# Patient Record
Sex: FEMALE | HISPANIC OR LATINO | Employment: UNEMPLOYED | URBAN - METROPOLITAN AREA
[De-identification: names, ages, dates, MRNs, and addresses within clinical notes are randomized per-mention and may not be internally consistent; named-entity substitution may affect disease eponyms.]

---

## 2021-06-07 ENCOUNTER — APPOINTMENT (OUTPATIENT)
Dept: GENERAL RADIOLOGY | Facility: CLINIC | Age: 26
End: 2021-06-07
Attending: FAMILY MEDICINE
Payer: COMMERCIAL

## 2021-06-07 ENCOUNTER — HOSPITAL ENCOUNTER (EMERGENCY)
Facility: CLINIC | Age: 26
Discharge: HOME | End: 2021-06-07
Attending: FAMILY MEDICINE | Admitting: FAMILY MEDICINE
Payer: COMMERCIAL

## 2021-06-07 VITALS
RESPIRATION RATE: 18 BRPM | DIASTOLIC BLOOD PRESSURE: 60 MMHG | OXYGEN SATURATION: 98 % | HEART RATE: 80 BPM | SYSTOLIC BLOOD PRESSURE: 112 MMHG | TEMPERATURE: 98.6 F

## 2021-06-07 DIAGNOSIS — E86.0 DEHYDRATION: ICD-10-CM

## 2021-06-07 DIAGNOSIS — B34.9 VIRAL SYNDROME: ICD-10-CM

## 2021-06-07 DIAGNOSIS — G44.219 EPISODIC TENSION-TYPE HEADACHE, NOT INTRACTABLE: ICD-10-CM

## 2021-06-07 LAB
ALBUMIN SERPL-MCNC: 3.1 G/DL (ref 3.4–5)
ALBUMIN UR-MCNC: 30 MG/DL
ALP SERPL-CCNC: 91 U/L (ref 40–150)
ALT SERPL W P-5'-P-CCNC: 63 U/L (ref 0–50)
ANION GAP SERPL CALCULATED.3IONS-SCNC: 4 MMOL/L (ref 3–14)
APPEARANCE UR: ABNORMAL
AST SERPL W P-5'-P-CCNC: 45 U/L (ref 0–45)
BACTERIA #/AREA URNS HPF: ABNORMAL /HPF
BASOPHILS # BLD AUTO: 0 10E9/L (ref 0–0.2)
BASOPHILS NFR BLD AUTO: 0.2 %
BILIRUB SERPL-MCNC: 0.7 MG/DL (ref 0.2–1.3)
BILIRUB UR QL STRIP: NEGATIVE
BUN SERPL-MCNC: 8 MG/DL (ref 7–30)
CALCIUM SERPL-MCNC: 8.5 MG/DL (ref 8.5–10.1)
CHLORIDE SERPL-SCNC: 105 MMOL/L (ref 94–109)
CO2 SERPL-SCNC: 28 MMOL/L (ref 20–32)
COLOR UR AUTO: YELLOW
CREAT SERPL-MCNC: 0.81 MG/DL (ref 0.52–1.04)
DIFFERENTIAL METHOD BLD: ABNORMAL
EOSINOPHIL # BLD AUTO: 0.1 10E9/L (ref 0–0.7)
EOSINOPHIL NFR BLD AUTO: 1.2 %
ERYTHROCYTE [DISTWIDTH] IN BLOOD BY AUTOMATED COUNT: 14.4 % (ref 10–15)
GFR SERPL CREATININE-BSD FRML MDRD: >90 ML/MIN/{1.73_M2}
GLUCOSE SERPL-MCNC: 88 MG/DL (ref 70–99)
GLUCOSE UR STRIP-MCNC: NEGATIVE MG/DL
HCG UR QL: NEGATIVE
HCT VFR BLD AUTO: 39.5 % (ref 35–47)
HGB BLD-MCNC: 12.1 G/DL (ref 11.7–15.7)
HGB UR QL STRIP: ABNORMAL
IMM GRANULOCYTES # BLD: 0.1 10E9/L (ref 0–0.4)
IMM GRANULOCYTES NFR BLD: 0.5 %
KETONES UR STRIP-MCNC: NEGATIVE MG/DL
LABORATORY COMMENT REPORT: NORMAL
LACTATE BLD-SCNC: 1.2 MMOL/L (ref 0.7–2)
LEUKOCYTE ESTERASE UR QL STRIP: NEGATIVE
LYMPHOCYTES # BLD AUTO: 1.6 10E9/L (ref 0.8–5.3)
LYMPHOCYTES NFR BLD AUTO: 17.1 %
MCH RBC QN AUTO: 28 PG (ref 26.5–33)
MCHC RBC AUTO-ENTMCNC: 30.6 G/DL (ref 31.5–36.5)
MCV RBC AUTO: 91 FL (ref 78–100)
MONOCYTES # BLD AUTO: 0.4 10E9/L (ref 0–1.3)
MONOCYTES NFR BLD AUTO: 4.6 %
MUCOUS THREADS #/AREA URNS LPF: PRESENT /LPF
NEUTROPHILS # BLD AUTO: 7 10E9/L (ref 1.6–8.3)
NEUTROPHILS NFR BLD AUTO: 76.4 %
NITRATE UR QL: NEGATIVE
NRBC # BLD AUTO: 0 10*3/UL
NRBC BLD AUTO-RTO: 0 /100
PH UR STRIP: 5.5 PH (ref 5–7)
PLATELET # BLD AUTO: 413 10E9/L (ref 150–450)
POTASSIUM SERPL-SCNC: 3.8 MMOL/L (ref 3.4–5.3)
PROT SERPL-MCNC: 7.4 G/DL (ref 6.8–8.8)
RBC # BLD AUTO: 4.32 10E12/L (ref 3.8–5.2)
RBC #/AREA URNS AUTO: 3 /HPF (ref 0–2)
SARS-COV-2 RNA RESP QL NAA+PROBE: NEGATIVE
SODIUM SERPL-SCNC: 137 MMOL/L (ref 133–144)
SOURCE: ABNORMAL
SP GR UR STRIP: 1.03 (ref 1–1.03)
SPECIMEN SOURCE: NORMAL
SQUAMOUS #/AREA URNS AUTO: 7 /HPF (ref 0–1)
UROBILINOGEN UR STRIP-MCNC: 2 MG/DL (ref 0–2)
WBC # BLD AUTO: 9.2 10E9/L (ref 4–11)
WBC #/AREA URNS AUTO: 3 /HPF (ref 0–5)

## 2021-06-07 PROCEDURE — 87088 URINE BACTERIA CULTURE: CPT | Performed by: FAMILY MEDICINE

## 2021-06-07 PROCEDURE — C9803 HOPD COVID-19 SPEC COLLECT: HCPCS | Performed by: FAMILY MEDICINE

## 2021-06-07 PROCEDURE — 83605 ASSAY OF LACTIC ACID: CPT | Performed by: STUDENT IN AN ORGANIZED HEALTH CARE EDUCATION/TRAINING PROGRAM

## 2021-06-07 PROCEDURE — 96361 HYDRATE IV INFUSION ADD-ON: CPT | Performed by: FAMILY MEDICINE

## 2021-06-07 PROCEDURE — 250N000011 HC RX IP 250 OP 636: Performed by: STUDENT IN AN ORGANIZED HEALTH CARE EDUCATION/TRAINING PROGRAM

## 2021-06-07 PROCEDURE — 87086 URINE CULTURE/COLONY COUNT: CPT | Performed by: FAMILY MEDICINE

## 2021-06-07 PROCEDURE — 71046 X-RAY EXAM CHEST 2 VIEWS: CPT

## 2021-06-07 PROCEDURE — 99284 EMERGENCY DEPT VISIT MOD MDM: CPT | Mod: GC | Performed by: FAMILY MEDICINE

## 2021-06-07 PROCEDURE — 80053 COMPREHEN METABOLIC PANEL: CPT | Performed by: STUDENT IN AN ORGANIZED HEALTH CARE EDUCATION/TRAINING PROGRAM

## 2021-06-07 PROCEDURE — 81001 URINALYSIS AUTO W/SCOPE: CPT | Performed by: FAMILY MEDICINE

## 2021-06-07 PROCEDURE — 96375 TX/PRO/DX INJ NEW DRUG ADDON: CPT | Performed by: FAMILY MEDICINE

## 2021-06-07 PROCEDURE — 96374 THER/PROPH/DIAG INJ IV PUSH: CPT | Performed by: FAMILY MEDICINE

## 2021-06-07 PROCEDURE — 250N000013 HC RX MED GY IP 250 OP 250 PS 637: Performed by: STUDENT IN AN ORGANIZED HEALTH CARE EDUCATION/TRAINING PROGRAM

## 2021-06-07 PROCEDURE — 258N000003 HC RX IP 258 OP 636: Performed by: STUDENT IN AN ORGANIZED HEALTH CARE EDUCATION/TRAINING PROGRAM

## 2021-06-07 PROCEDURE — 87635 SARS-COV-2 COVID-19 AMP PRB: CPT | Performed by: FAMILY MEDICINE

## 2021-06-07 PROCEDURE — 99284 EMERGENCY DEPT VISIT MOD MDM: CPT | Mod: 25 | Performed by: FAMILY MEDICINE

## 2021-06-07 PROCEDURE — 81025 URINE PREGNANCY TEST: CPT | Performed by: FAMILY MEDICINE

## 2021-06-07 PROCEDURE — 85025 COMPLETE CBC W/AUTO DIFF WBC: CPT | Performed by: STUDENT IN AN ORGANIZED HEALTH CARE EDUCATION/TRAINING PROGRAM

## 2021-06-07 RX ORDER — KETOROLAC TROMETHAMINE 30 MG/ML
30 INJECTION, SOLUTION INTRAMUSCULAR; INTRAVENOUS ONCE
Status: COMPLETED | OUTPATIENT
Start: 2021-06-07 | End: 2021-06-07

## 2021-06-07 RX ORDER — ACETAMINOPHEN 325 MG/1
975 TABLET ORAL ONCE
Status: COMPLETED | OUTPATIENT
Start: 2021-06-07 | End: 2021-06-07

## 2021-06-07 RX ORDER — ONDANSETRON 2 MG/ML
4 INJECTION INTRAMUSCULAR; INTRAVENOUS EVERY 30 MIN PRN
Status: DISCONTINUED | OUTPATIENT
Start: 2021-06-07 | End: 2021-06-07 | Stop reason: HOSPADM

## 2021-06-07 RX ORDER — SODIUM CHLORIDE 9 MG/ML
INJECTION, SOLUTION INTRAVENOUS CONTINUOUS
Status: DISCONTINUED | OUTPATIENT
Start: 2021-06-07 | End: 2021-06-07 | Stop reason: HOSPADM

## 2021-06-07 RX ADMIN — ACETAMINOPHEN 975 MG: 325 TABLET, FILM COATED ORAL at 15:43

## 2021-06-07 RX ADMIN — SODIUM CHLORIDE 1000 ML: 9 INJECTION, SOLUTION INTRAVENOUS at 15:44

## 2021-06-07 RX ADMIN — SODIUM CHLORIDE: 9 INJECTION, SOLUTION INTRAVENOUS at 17:13

## 2021-06-07 RX ADMIN — ONDANSETRON 4 MG: 2 INJECTION INTRAMUSCULAR; INTRAVENOUS at 15:43

## 2021-06-07 RX ADMIN — KETOROLAC TROMETHAMINE 30 MG: 30 INJECTION, SOLUTION INTRAMUSCULAR; INTRAVENOUS at 15:44

## 2021-06-07 ASSESSMENT — ENCOUNTER SYMPTOMS: HEADACHES: 1

## 2021-06-07 NOTE — ED PROVIDER NOTES
ED Provider Note  [facility]      History     Chief Complaint   Patient presents with     Headache       Headache    Minda Silverman is a 25 year old female with no chronic health history who presents with 2-day history of vomitus with new onset headache this morning.    History afternoon patient started feeling nauseous and began vomiting.  Unable to eat and barely drink without vomiting.  Currently feels nauseous with urge to vomit though has no contents in stomach.  Yesterday her only symptoms were the nausea and vomit, but this morning she woke up with a severe headache which she describes as one of the worst of her life.  Since morning she is also had periods of feeling extremely cold and sweaty.  Her headache is sensitive to light and she is also had blurring of vision when the headache is at its most severe.  She has not had a headache like this before, describes it as a persistent pulsing ache.  She has full range of motion of her neck and no nuchal rigidity or pain with neck extension or flexion.    Patient has had no recent chest pain, difficulty breathing, diarrhea or constipation.  Has not been able to eat and can barely tolerate liquids due to the nausea.    [place]  She and her family had a picnic that included turkey sandwiches and swimming.  Patient did not get in the water and just watched family swim, no chance she swallowed lake water.  Family also ate the same turkey and no one else in her household is currently sick.  She lives with her brother, sister-in-law, her daughter, and her nieces and nephews. No one in the household has had any recent illnesses.  No recent sick contacts.  Patient has not vaccinated for COVID.    Patient has no chronic illnesses and does not take any daily medications.    Past Medical History  Past Medical History:   Diagnosis Date     NO ACTIVE PROBLEMS      No past surgical history on  file.  No current outpatient medications on file.    No Known Allergies  Family History  No family history on file.  Social History   Social History     Tobacco Use     Smoking status: Current Every Day Smoker     Years: 0.20   Substance Use Topics     Alcohol use: No     Drug use: Not on file      Past medical history, past surgical history, medications, allergies, family history, and social history were reviewed with the patient. No additional pertinent items.       Review of Systems   Neurological: Positive for headaches.     A complete review of systems was performed with pertinent positives and negatives noted in the HPI, and all other systems negative.    Physical Exam   BP: (!) 146/91  Pulse: 92  Temp: 101.6  F (38.7  C)  Resp: 20  SpO2: 98 %     Physical Exam  Constitutional:       Appearance: She is obese.   HENT:      Head: Normocephalic and atraumatic.   Neck:      Musculoskeletal: Normal range of motion. No neck rigidity or muscular tenderness.   Cardiovascular:      Rate and Rhythm: Normal rate and regular rhythm.      Pulses: Normal pulses.      Heart sounds: Normal heart sounds.   Pulmonary:      Effort: Pulmonary effort is normal.      Breath sounds: Normal breath sounds.   Abdominal:      General: Bowel sounds are normal.      Tenderness: There is no abdominal tenderness.   Lymphadenopathy:      Cervical: No cervical adenopathy.   Neurological:      General: No focal deficit present.      Mental Status: She is alert and oriented to person, place, and time.       ED Course      Procedures           Results for orders placed or performed during the hospital encounter of 06/07/21   CBC with platelets differential     Status: Abnormal   Result Value Ref Range    WBC 9.2 4.0 - 11.0 10e9/L    RBC Count 4.32 3.8 - 5.2 10e12/L    Hemoglobin 12.1 11.7 - 15.7 g/dL    Hematocrit 39.5 35.0 - 47.0 %    MCV 91 78 - 100 fl    MCH 28.0 26.5 - 33.0 pg    MCHC 30.6 (L) 31.5 - 36.5 g/dL    RDW 14.4 10.0 - 15.0 %     Platelet Count 413 150 - 450 10e9/L    Diff Method Automated Method     % Neutrophils 76.4 %    % Lymphocytes 17.1 %    % Monocytes 4.6 %    % Eosinophils 1.2 %    % Basophils 0.2 %    % Immature Granulocytes 0.5 %    Nucleated RBCs 0 0 /100    Absolute Neutrophil 7.0 1.6 - 8.3 10e9/L    Absolute Lymphocytes 1.6 0.8 - 5.3 10e9/L    Absolute Monocytes 0.4 0.0 - 1.3 10e9/L    Absolute Eosinophils 0.1 0.0 - 0.7 10e9/L    Absolute Basophils 0.0 0.0 - 0.2 10e9/L    Abs Immature Granulocytes 0.1 0 - 0.4 10e9/L    Absolute Nucleated RBC 0.0    Comprehensive metabolic panel     Status: Abnormal   Result Value Ref Range    Sodium 137 133 - 144 mmol/L    Potassium 3.8 3.4 - 5.3 mmol/L    Chloride 105 94 - 109 mmol/L    Carbon Dioxide 28 20 - 32 mmol/L    Anion Gap 4 3 - 14 mmol/L    Glucose 88 70 - 99 mg/dL    Urea Nitrogen 8 7 - 30 mg/dL    Creatinine 0.81 0.52 - 1.04 mg/dL    GFR Estimate >90 >60 mL/min/[1.73_m2]    GFR Estimate If Black >90 >60 mL/min/[1.73_m2]    Calcium 8.5 8.5 - 10.1 mg/dL    Bilirubin Total 0.7 0.2 - 1.3 mg/dL    Albumin 3.1 (L) 3.4 - 5.0 g/dL    Protein Total 7.4 6.8 - 8.8 g/dL    Alkaline Phosphatase 91 40 - 150 U/L    ALT 63 (H) 0 - 50 U/L    AST 45 0 - 45 U/L   Lactic acid whole blood     Status: None   Result Value Ref Range    Lactic Acid 1.2 0.7 - 2.0 mmol/L   UA with Microscopic     Status: Abnormal   Result Value Ref Range    Color Urine Yellow     Appearance Urine Slightly Cloudy     Glucose Urine Negative NEG^Negative mg/dL    Bilirubin Urine Negative NEG^Negative    Ketones Urine Negative NEG^Negative mg/dL    Specific Gravity Urine 1.035 1.003 - 1.035    Blood Urine Moderate (A) NEG^Negative    pH Urine 5.5 5.0 - 7.0 pH    Protein Albumin Urine 30 (A) NEG^Negative mg/dL    Urobilinogen mg/dL 2.0 0.0 - 2.0 mg/dL    Nitrite Urine Negative NEG^Negative    Leukocyte Esterase Urine Negative NEG^Negative    Source Midstream Urine     WBC Urine 3 0 - 5 /HPF    RBC Urine 3 (H) 0 - 2 /HPF     Bacteria Urine None (A) NEG^Negative /HPF    Squamous Epithelial /HPF Urine 7 (H) 0 - 1 /HPF    Mucous Urine Present (A) NEG^Negative /LPF   HCG qualitative urine     Status: None   Result Value Ref Range    HCG Qual Urine Negative NEG^Negative     Medications   0.9% sodium chloride BOLUS (0 mLs Intravenous Stopped 6/7/21 1636)     Followed by   sodium chloride 0.9% infusion (has no administration in time range)   ondansetron (ZOFRAN) injection 4 mg (4 mg Intravenous Given 6/7/21 1543)   ketorolac (TORADOL) injection 30 mg (30 mg Intravenous Given 6/7/21 1544)   acetaminophen (TYLENOL) tablet 975 mg (975 mg Oral Given 6/7/21 1543)        Assessments & Plan (with Medical Decision Making)   25-year-old with 2-day history of nausea and vomit with new onset headache.  Patient's illness likely viral in etiology and exam not consistent with meningitis (i.e. no nuchal rigidity).  Headache this morning likely the result of electrolyte depletion from poor p.o. intake and excessive vomiting.  We will focus on pain control and fluid repletion.    -CBC, CMP, lactate  -1 L fluid bolus  -Zofran as needed  -Acetaminophen p.o.  -Toradol injection    I have reviewed the nursing notes. I have reviewed the findings, diagnosis, plan and need for follow up with the patient.    Patient with likely headache dehydration viral syndrome at this time symptoms have resolved and improved significantly with IV fluids Tylenol and Zofran patient has likely viral syndrome at time of dictation Covid test is pending as is urinalysis otherwise essentially negative work-up thus far.  Once again repeat examination did not reveal any nuchal rigidity or meningeal signs.  Patient will remain here in the emergency room until Covid test and urinalysis results are returned.    Final diagnoses:   Viral syndrome   Episodic tension-type headache, not intractable   Dehydration     This patient has been discussed with and seen by my attending who agrees with my  assessment and plan.     Tasneem Eason MD  PGY1 Psychiatry Resident    --  Sam Trimble  [facility] ED EMERGENCY DEPARTMENT  6/7/2021     Sam Trimble MD  06/07/21 [id number]

## 2021-06-07 NOTE — ED TRIAGE NOTES
Pt states no hx of HA (denies neck pain but has a temp) and placed on COVID precaution pending provider final decision.

## 2021-06-07 NOTE — ED NOTES
Emergency Department Patient Sign-out       Brief HPI:  This is a 25 year old female signed out to me by Dr. Trimble.  See initial ED Provider note for details of the presentation.      Awaiting Covid test result at time of signout.            Exam:   Patient Vitals for the past 24 hrs:   BP Temp Temp src Pulse Resp SpO2   06/07/21 1807 109/59 -- -- 72 -- 99 %   06/07/21 1619 106/57 98.6  F (37  C) Oral 75 18 100 %   06/07/21 1434 (!) 146/91 101.6  F (38.7  C) Oral 92 20 98 %           ED RESULTS:   Results for orders placed or performed during the hospital encounter of 06/07/21 (from the past 24 hour(s))   CBC with platelets differential     Status: Abnormal    Collection Time: 06/07/21  3:44 PM   Result Value Ref Range    WBC 9.2 4.0 - 11.0 10e9/L    RBC Count 4.32 3.8 - 5.2 10e12/L    Hemoglobin 12.1 11.7 - 15.7 g/dL    Hematocrit 39.5 35.0 - 47.0 %    MCV 91 78 - 100 fl    MCH 28.0 26.5 - 33.0 pg    MCHC 30.6 (L) 31.5 - 36.5 g/dL    RDW 14.4 10.0 - 15.0 %    Platelet Count 413 150 - 450 10e9/L    Diff Method Automated Method     % Neutrophils 76.4 %    % Lymphocytes 17.1 %    % Monocytes 4.6 %    % Eosinophils 1.2 %    % Basophils 0.2 %    % Immature Granulocytes 0.5 %    Nucleated RBCs 0 0 /100    Absolute Neutrophil 7.0 1.6 - 8.3 10e9/L    Absolute Lymphocytes 1.6 0.8 - 5.3 10e9/L    Absolute Monocytes 0.4 0.0 - 1.3 10e9/L    Absolute Eosinophils 0.1 0.0 - 0.7 10e9/L    Absolute Basophils 0.0 0.0 - 0.2 10e9/L    Abs Immature Granulocytes 0.1 0 - 0.4 10e9/L    Absolute Nucleated RBC 0.0    Comprehensive metabolic panel     Status: Abnormal    Collection Time: 06/07/21  3:44 PM   Result Value Ref Range    Sodium 137 133 - 144 mmol/L    Potassium 3.8 3.4 - 5.3 mmol/L    Chloride 105 94 - 109 mmol/L    Carbon Dioxide 28 20 - 32 mmol/L    Anion Gap 4 3 - 14 mmol/L    Glucose 88 70 - 99 mg/dL    Urea Nitrogen 8 7 - 30 mg/dL    Creatinine 0.81 0.52 - 1.04 mg/dL    GFR Estimate >90 >60 mL/min/[1.73_m2]     GFR Estimate If Black >90 >60 mL/min/[1.73_m2]    Calcium 8.5 8.5 - 10.1 mg/dL    Bilirubin Total 0.7 0.2 - 1.3 mg/dL    Albumin 3.1 (L) 3.4 - 5.0 g/dL    Protein Total 7.4 6.8 - 8.8 g/dL    Alkaline Phosphatase 91 40 - 150 U/L    ALT 63 (H) 0 - 50 U/L    AST 45 0 - 45 U/L   Lactic acid whole blood     Status: None    Collection Time: 06/07/21  3:44 PM   Result Value Ref Range    Lactic Acid 1.2 0.7 - 2.0 mmol/L   UA with Microscopic     Status: Abnormal    Collection Time: 06/07/21  4:19 PM   Result Value Ref Range    Color Urine Yellow     Appearance Urine Slightly Cloudy     Glucose Urine Negative NEG^Negative mg/dL    Bilirubin Urine Negative NEG^Negative    Ketones Urine Negative NEG^Negative mg/dL    Specific Gravity Urine 1.035 1.003 - 1.035    Blood Urine Moderate (A) NEG^Negative    pH Urine 5.5 5.0 - 7.0 pH    Protein Albumin Urine 30 (A) NEG^Negative mg/dL    Urobilinogen mg/dL 2.0 0.0 - 2.0 mg/dL    Nitrite Urine Negative NEG^Negative    Leukocyte Esterase Urine Negative NEG^Negative    Source Midstream Urine     WBC Urine 3 0 - 5 /HPF    RBC Urine 3 (H) 0 - 2 /HPF    Bacteria Urine None (A) NEG^Negative /HPF    Squamous Epithelial /HPF Urine 7 (H) 0 - 1 /HPF    Mucous Urine Present (A) NEG^Negative /LPF   HCG qualitative urine     Status: None    Collection Time: 06/07/21  4:19 PM   Result Value Ref Range    HCG Qual Urine Negative NEG^Negative   Urine Culture     Status: None (Preliminary result)    Collection Time: 06/07/21  4:19 PM    Specimen: Urine clean catch; Midstream Urine   Result Value Ref Range    Specimen Description Midstream Urine     Special Requests Specimen received in preservative     Culture Micro PENDING    Symptomatic SARS-CoV-2 COVID-19 Virus (Coronavirus) by PCR     Status: None    Collection Time: 06/07/21  4:19 PM    Specimen: Nasopharyngeal   Result Value Ref Range    SARS-CoV-2 Virus Specimen Source Nares     SARS-CoV-2 PCR Result NEGATIVE     SARS-CoV-2 PCR Comment (Note)     XR Chest 2 Views     Status: None    Collection Time: 06/07/21  4:52 PM    Narrative    CHEST TWO VIEWS  6/7/2021 4:52 PM     HISTORY: 25-year-old woman with fever and headache.    COMPARISON: None       Impression    IMPRESSION: Heart Size is normal. No pleural effusion, pneumothorax,  or abnormal area of consolidation.    MARIAN KEARNS MD     6:44 PM Covid test negative.  Appears clinically well.  Abdomen soft and nontender.    ED MEDICATIONS:   Medications   0.9% sodium chloride BOLUS (0 mLs Intravenous Stopped 6/7/21 1636)     Followed by   sodium chloride 0.9% infusion ( Intravenous Stopped 6/7/21 1815)   ondansetron (ZOFRAN) injection 4 mg (4 mg Intravenous Given 6/7/21 1543)   ketorolac (TORADOL) injection 30 mg (30 mg Intravenous Given 6/7/21 1544)   acetaminophen (TYLENOL) tablet 975 mg (975 mg Oral Given 6/7/21 1543)         Impression:    ICD-10-CM    1. Viral syndrome  B34.9 Urine Culture     Symptomatic SARS-CoV-2 COVID-19 Virus (Coronavirus) by PCR   2. Episodic tension-type headache, not intractable  G44.219    3. Dehydration  E86.0        Plan:    discharge home.        BEN COLLINS MD, Ben Johnson MD  06/07/21 [id number]

## 2021-06-07 NOTE — DISCHARGE INSTRUCTIONS
Patient with headache and dehydration likely secondary to viral syndrome at this time will be discharged to home with anti-inflammatories pushing fluids and following up with primary if not improving.  Even though your Covid test was negative, you should quarantine for 10 days after your symptoms started.    You can be around others after:  10 days since symptoms first appeared and  24 hours with no fever without the use of fever-reducing medications and  Other symptoms of COVID-19 are improving*

## 2021-06-09 LAB
BACTERIA SPEC CULT: ABNORMAL
BACTERIA SPEC CULT: ABNORMAL
Lab: ABNORMAL
SPECIMEN SOURCE: ABNORMAL

## 2021-06-09 NOTE — RESULT ENCOUNTER NOTE
Final urine culture report is negative.  Adult Negative Urine culture parameters per protocol: Any # Urogenital single or mixed organism, <10,000 col/ml single organism (cath specimen), and <50,000 col/ml single organism (midstream or cath specimen).  [facility] Emergency Dept discharge antibiotic prescribed (If applicable): None  Treatment recommendations per [facility] ED Lab Result Urine Culture protocol.

## 2021-10-04 ENCOUNTER — HOSPITAL ENCOUNTER (EMERGENCY)
Facility: CLINIC | Age: 26
Discharge: HOME | End: 2021-10-04
Attending: FAMILY MEDICINE | Admitting: FAMILY MEDICINE
Payer: COMMERCIAL

## 2021-10-04 VITALS
WEIGHT: 293 LBS | SYSTOLIC BLOOD PRESSURE: 91 MMHG | TEMPERATURE: 98 F | DIASTOLIC BLOOD PRESSURE: 47 MMHG | HEART RATE: 74 BPM | OXYGEN SATURATION: 100 %

## 2021-10-04 DIAGNOSIS — N39.0 ACUTE UTI: ICD-10-CM

## 2021-10-04 DIAGNOSIS — R51.9 HEADACHE DISORDER: ICD-10-CM

## 2021-10-04 DIAGNOSIS — R11.2 NAUSEA AND VOMITING, INTRACTABILITY OF VOMITING NOT SPECIFIED, UNSPECIFIED VOMITING TYPE: ICD-10-CM

## 2021-10-04 LAB
ALBUMIN SERPL-MCNC: 3.3 G/DL (ref 3.4–5)
ALBUMIN UR-MCNC: 30 MG/DL
ALP SERPL-CCNC: 86 U/L (ref 40–150)
ALT SERPL W P-5'-P-CCNC: 53 U/L (ref 0–50)
ANION GAP SERPL CALCULATED.3IONS-SCNC: 4 MMOL/L (ref 3–14)
APPEARANCE UR: ABNORMAL
AST SERPL W P-5'-P-CCNC: 26 U/L (ref 0–45)
BACTERIA #/AREA URNS HPF: ABNORMAL /HPF
BASOPHILS # BLD AUTO: 0.1 10E3/UL (ref 0–0.2)
BASOPHILS NFR BLD AUTO: 0 %
BILIRUB SERPL-MCNC: 0.6 MG/DL (ref 0.2–1.3)
BILIRUB UR QL STRIP: NEGATIVE
BUN SERPL-MCNC: 8 MG/DL (ref 7–30)
CALCIUM SERPL-MCNC: 9 MG/DL (ref 8.5–10.1)
CHLORIDE BLD-SCNC: 105 MMOL/L (ref 94–109)
CO2 SERPL-SCNC: 26 MMOL/L (ref 20–32)
COLOR UR AUTO: ABNORMAL
CREAT SERPL-MCNC: 0.63 MG/DL (ref 0.52–1.04)
EOSINOPHIL # BLD AUTO: 0 10E3/UL (ref 0–0.7)
EOSINOPHIL NFR BLD AUTO: 0 %
ERYTHROCYTE [DISTWIDTH] IN BLOOD BY AUTOMATED COUNT: 16.5 % (ref 10–15)
GFR SERPL CREATININE-BSD FRML MDRD: >90 ML/MIN/1.73M2
GLUCOSE BLD-MCNC: 98 MG/DL (ref 70–99)
GLUCOSE UR STRIP-MCNC: NEGATIVE MG/DL
HCG SERPL QL: NEGATIVE
HCT VFR BLD AUTO: 43 % (ref 35–47)
HGB BLD-MCNC: 13.2 G/DL (ref 11.7–15.7)
HGB UR QL STRIP: ABNORMAL
HOLD SPECIMEN: NORMAL
IMM GRANULOCYTES # BLD: 0.1 10E3/UL
IMM GRANULOCYTES NFR BLD: 1 %
KETONES UR STRIP-MCNC: NEGATIVE MG/DL
LEUKOCYTE ESTERASE UR QL STRIP: ABNORMAL
LIPASE SERPL-CCNC: 80 U/L (ref 73–393)
LYMPHOCYTES # BLD AUTO: 2.1 10E3/UL (ref 0.8–5.3)
LYMPHOCYTES NFR BLD AUTO: 15 %
MCH RBC QN AUTO: 27.9 PG (ref 26.5–33)
MCHC RBC AUTO-ENTMCNC: 30.7 G/DL (ref 31.5–36.5)
MCV RBC AUTO: 91 FL (ref 78–100)
MONOCYTES # BLD AUTO: 0.5 10E3/UL (ref 0–1.3)
MONOCYTES NFR BLD AUTO: 3 %
MUCOUS THREADS #/AREA URNS LPF: PRESENT /LPF
NEUTROPHILS # BLD AUTO: 11.5 10E3/UL (ref 1.6–8.3)
NEUTROPHILS NFR BLD AUTO: 81 %
NITRATE UR QL: NEGATIVE
NRBC # BLD AUTO: 0 10E3/UL
NRBC BLD AUTO-RTO: 0 /100
PH UR STRIP: 5.5 [PH] (ref 5–7)
PLATELET # BLD AUTO: 396 10E3/UL (ref 150–450)
POTASSIUM BLD-SCNC: 3.7 MMOL/L (ref 3.4–5.3)
PROT SERPL-MCNC: 8.7 G/DL (ref 6.8–8.8)
RBC # BLD AUTO: 4.73 10E6/UL (ref 3.8–5.2)
RBC URINE: 15 /HPF
SARS-COV-2 RNA RESP QL NAA+PROBE: NEGATIVE
SODIUM SERPL-SCNC: 135 MMOL/L (ref 133–144)
SP GR UR STRIP: 1.03 (ref 1–1.03)
SQUAMOUS EPITHELIAL: 8 /HPF
UROBILINOGEN UR STRIP-MCNC: NORMAL MG/DL
WBC # BLD AUTO: 14.2 10E3/UL (ref 4–11)
WBC URINE: 12 /HPF

## 2021-10-04 PROCEDURE — 96376 TX/PRO/DX INJ SAME DRUG ADON: CPT | Performed by: FAMILY MEDICINE

## 2021-10-04 PROCEDURE — C9803 HOPD COVID-19 SPEC COLLECT: HCPCS | Performed by: FAMILY MEDICINE

## 2021-10-04 PROCEDURE — 84703 CHORIONIC GONADOTROPIN ASSAY: CPT | Performed by: FAMILY MEDICINE

## 2021-10-04 PROCEDURE — 99284 EMERGENCY DEPT VISIT MOD MDM: CPT | Mod: 25 | Performed by: FAMILY MEDICINE

## 2021-10-04 PROCEDURE — 250N000011 HC RX IP 250 OP 636: Performed by: FAMILY MEDICINE

## 2021-10-04 PROCEDURE — 258N000003 HC RX IP 258 OP 636

## 2021-10-04 PROCEDURE — 96365 THER/PROPH/DIAG IV INF INIT: CPT | Performed by: FAMILY MEDICINE

## 2021-10-04 PROCEDURE — 96375 TX/PRO/DX INJ NEW DRUG ADDON: CPT | Performed by: FAMILY MEDICINE

## 2021-10-04 PROCEDURE — 96361 HYDRATE IV INFUSION ADD-ON: CPT | Performed by: FAMILY MEDICINE

## 2021-10-04 PROCEDURE — 82040 ASSAY OF SERUM ALBUMIN: CPT | Performed by: FAMILY MEDICINE

## 2021-10-04 PROCEDURE — 83690 ASSAY OF LIPASE: CPT | Performed by: FAMILY MEDICINE

## 2021-10-04 PROCEDURE — 87086 URINE CULTURE/COLONY COUNT: CPT | Performed by: FAMILY MEDICINE

## 2021-10-04 PROCEDURE — 36415 COLL VENOUS BLD VENIPUNCTURE: CPT | Performed by: FAMILY MEDICINE

## 2021-10-04 PROCEDURE — 96366 THER/PROPH/DIAG IV INF ADDON: CPT | Performed by: FAMILY MEDICINE

## 2021-10-04 PROCEDURE — 81001 URINALYSIS AUTO W/SCOPE: CPT | Performed by: FAMILY MEDICINE

## 2021-10-04 PROCEDURE — 99284 EMERGENCY DEPT VISIT MOD MDM: CPT | Performed by: FAMILY MEDICINE

## 2021-10-04 PROCEDURE — 258N000003 HC RX IP 258 OP 636: Performed by: FAMILY MEDICINE

## 2021-10-04 PROCEDURE — 85004 AUTOMATED DIFF WBC COUNT: CPT | Performed by: FAMILY MEDICINE

## 2021-10-04 PROCEDURE — U0005 INFEC AGEN DETEC AMPLI PROBE: HCPCS | Performed by: FAMILY MEDICINE

## 2021-10-04 RX ORDER — METOCLOPRAMIDE HYDROCHLORIDE 5 MG/ML
5 INJECTION INTRAMUSCULAR; INTRAVENOUS ONCE
Status: COMPLETED | OUTPATIENT
Start: 2021-10-04 | End: 2021-10-04

## 2021-10-04 RX ORDER — KETOROLAC TROMETHAMINE 15 MG/ML
15 INJECTION, SOLUTION INTRAMUSCULAR; INTRAVENOUS ONCE
Status: COMPLETED | OUTPATIENT
Start: 2021-10-04 | End: 2021-10-04

## 2021-10-04 RX ORDER — ONDANSETRON 2 MG/ML
4 INJECTION INTRAMUSCULAR; INTRAVENOUS
Status: COMPLETED | OUTPATIENT
Start: 2021-10-04 | End: 2021-10-04

## 2021-10-04 RX ORDER — SODIUM CHLORIDE 9 MG/ML
INJECTION, SOLUTION INTRAVENOUS
Status: COMPLETED
Start: 2021-10-04 | End: 2021-10-04

## 2021-10-04 RX ORDER — CEFDINIR 300 MG/1
300 CAPSULE ORAL 2 TIMES DAILY
Qty: 14 CAPSULE | Refills: 0 | Status: SHIPPED | OUTPATIENT
Start: 2021-10-04 | End: 2021-10-11

## 2021-10-04 RX ORDER — DIPHENHYDRAMINE HYDROCHLORIDE 50 MG/ML
25 INJECTION INTRAMUSCULAR; INTRAVENOUS ONCE
Status: COMPLETED | OUTPATIENT
Start: 2021-10-04 | End: 2021-10-04

## 2021-10-04 RX ORDER — METOCLOPRAMIDE 10 MG/1
10 TABLET ORAL 3 TIMES DAILY PRN
Qty: 12 TABLET | Refills: 0 | Status: SHIPPED | OUTPATIENT
Start: 2021-10-04

## 2021-10-04 RX ORDER — LIDOCAINE 40 MG/G
CREAM TOPICAL
Status: DISCONTINUED | OUTPATIENT
Start: 2021-10-04 | End: 2021-10-04 | Stop reason: HOSPADM

## 2021-10-04 RX ORDER — CEFTRIAXONE 1 G/1
1 INJECTION, POWDER, FOR SOLUTION INTRAMUSCULAR; INTRAVENOUS ONCE
Status: COMPLETED | OUTPATIENT
Start: 2021-10-04 | End: 2021-10-04

## 2021-10-04 RX ADMIN — SODIUM CHLORIDE 1000 ML: 9 INJECTION, SOLUTION INTRAVENOUS at 15:51

## 2021-10-04 RX ADMIN — METOCLOPRAMIDE HYDROCHLORIDE 5 MG: 5 INJECTION INTRAMUSCULAR; INTRAVENOUS at 11:53

## 2021-10-04 RX ADMIN — SODIUM CHLORIDE 1000 ML: 9 INJECTION, SOLUTION INTRAVENOUS at 10:10

## 2021-10-04 RX ADMIN — METOCLOPRAMIDE HYDROCHLORIDE 5 MG: 5 INJECTION INTRAMUSCULAR; INTRAVENOUS at 15:52

## 2021-10-04 RX ADMIN — ONDANSETRON 4 MG: 2 INJECTION INTRAMUSCULAR; INTRAVENOUS at 10:09

## 2021-10-04 RX ADMIN — DIPHENHYDRAMINE HYDROCHLORIDE 25 MG: 50 INJECTION INTRAMUSCULAR; INTRAVENOUS at 11:55

## 2021-10-04 RX ADMIN — CEFTRIAXONE SODIUM 1 G: 1 INJECTION, POWDER, FOR SOLUTION INTRAMUSCULAR; INTRAVENOUS at 15:51

## 2021-10-04 RX ADMIN — KETOROLAC TROMETHAMINE 15 MG: 15 INJECTION, SOLUTION INTRAMUSCULAR; INTRAVENOUS at 11:50

## 2021-10-04 RX ADMIN — KETOROLAC TROMETHAMINE 15 MG: 15 INJECTION, SOLUTION INTRAMUSCULAR; INTRAVENOUS at 15:52

## 2021-10-04 RX ADMIN — SODIUM CHLORIDE 100 ML: 900 INJECTION INTRAVENOUS at 15:52

## 2021-10-04 ASSESSMENT — ENCOUNTER SYMPTOMS
SHORTNESS OF BREATH: 0
COUGH: 0
BLOOD IN STOOL: 0
BRUISES/BLEEDS EASILY: 0
DIAPHORESIS: 0
SINUS PRESSURE: 0
ACTIVITY CHANGE: 1
DIARRHEA: 0
TROUBLE SWALLOWING: 0
HEMATURIA: 0
DECREASED CONCENTRATION: 1
BACK PAIN: 0
WEAKNESS: 1
HALLUCINATIONS: 0
SEIZURES: 0
SPEECH DIFFICULTY: 0
FLANK PAIN: 0
NECK PAIN: 0
SINUS PAIN: 0
VOMITING: 1
AGITATION: 0
PHOTOPHOBIA: 1
CHOKING: 0
CONFUSION: 0
FATIGUE: 1
LIGHT-HEADEDNESS: 0
NECK STIFFNESS: 0
SORE THROAT: 0
FEVER: 1
WOUND: 0
HEADACHES: 1
CHILLS: 1
ABDOMINAL PAIN: 0
NAUSEA: 1
NERVOUS/ANXIOUS: 0
APPETITE CHANGE: 1
NUMBNESS: 0
TREMORS: 0

## 2021-10-04 NOTE — DISCHARGE INSTRUCTIONS
Home.  Your labs showed concerns for bladder infection.  IV antibiotics given in the ER.  IV fluids also given and pain and nausea medications.  Home and rest.  Take ibuprofen and tylenol  Reglan for nausea and headache.  Take the omnicef for bladder infection.  See MD for a recheck in 2 days.  Return if any concerns.

## 2021-10-04 NOTE — ED PROVIDER NOTES
[facility] EMERGENCY DEPARTMENT ([facility])    10/04/21     ED 3  9:50 AM   History     Chief Complaint   Patient presents with     Nausea & Vomiting     Hasn't felt well since yesterday. N/V headache chills     The history is provided by the patient and medical records.     Minda Silverman is a 26 year old female who presents with nausea, vomiting, headaches, subjective fevers and chills that started yesterday. She states this is similar to when she had presented to the ED on 6/7/21. At that time she was unable to keep down any oral intake due to multiple episodes of nausea and vomiting. She also had headache with photophobia, cold sweats and chills. She was seen by Dr. Trimble who performed workup with labs and chest x-ray. It was felt that her symptoms were consistent with viral illness, and the headache was a result of electrolyte depletion from nausea and vomiting. She was treated with 1L fluids, Zofran, acetaminophen and Toradol with improvement to symptoms. Patient desires similar treatment today. No abdominal pain. She denies pregnancy. Last menstrual period 1 month ago. No sinus symptoms. No other chronic medical problems.  Patient notes some photophobia also but no document history of migraines.  No neck stiffness noted no rash no sore throat.  No neurological focal findings.  Denies being pregnant.  No diarrhea.  No hematemesis.  No coughing chest pain shortness of breath.    Per [facility] records, patient has had both doses of the COVID-19 vaccine, last dose given on 8/25/2021.    Past Medical History  Past Medical History:   Diagnosis Date     NO ACTIVE PROBLEMS      History reviewed. No pertinent surgical history.  cefdinir (OMNICEF) 300 MG capsule  metoclopramide (REGLAN) 10 MG tablet      No Known Allergies  Family History  History reviewed. No pertinent family history.  Social History   Social History     Tobacco Use     Smoking status: Current Every Day Smoker     Years: 0.20      Smokeless tobacco: Never Used   Substance Use Topics     Alcohol use: No     Drug use: Never      Past medical history, past surgical history, medications, allergies, family history, and social history were reviewed with the patient. No additional pertinent items.       Review of Systems   Constitutional: Positive for activity change, appetite change, chills, fatigue and fever (subjective). Negative for diaphoresis.   HENT: Negative for dental problem, drooling, sinus pressure, sinus pain, sneezing, sore throat and trouble swallowing.    Eyes: Positive for photophobia. Negative for visual disturbance.   Respiratory: Negative for cough, choking and shortness of breath.    Cardiovascular: Negative for chest pain and leg swelling.   Gastrointestinal: Positive for nausea and vomiting. Negative for abdominal pain, blood in stool and diarrhea.   Genitourinary: Negative for flank pain, hematuria, vaginal bleeding and vaginal discharge.   Musculoskeletal: Negative for back pain, gait problem, neck pain and neck stiffness.   Skin: Negative for rash and wound.   Allergic/Immunologic: Negative for immunocompromised state.   Neurological: Positive for weakness (general) and headaches. Negative for tremors, seizures, syncope, speech difficulty, light-headedness and numbness.   Hematological: Does not bruise/bleed easily.   Psychiatric/Behavioral: Positive for decreased concentration. Negative for agitation, confusion and hallucinations. The patient is not nervous/anxious.    All other systems reviewed and are negative.    A complete review of systems was performed with pertinent positives and negatives noted in the HPI, and all other systems negative.    Physical Exam   BP: 125/79  Pulse: 100  Temp: 99.7  F (37.6  C)  Weight: (!) 154.2 kg (340 lb)  SpO2: 99 %  Physical Exam  Vitals and nursing note reviewed.   Constitutional:       General: She is in acute distress.      Appearance: She is well-developed. She is not  toxic-appearing or diaphoretic.      Comments: Patient is nontoxic.  Alert and oriented x3.   HENT:      Head: Normocephalic and atraumatic.      Nose: No congestion or rhinorrhea.      Mouth/Throat:      Mouth: Mucous membranes are moist.   Eyes:      General: No scleral icterus.     Extraocular Movements: Extraocular movements intact.      Conjunctiva/sclera: Conjunctivae normal.      Pupils: Pupils are equal, round, and reactive to light.   Neck:      Comments: No nuchal rigidity no meningismus  Cardiovascular:      Rate and Rhythm: Normal rate and regular rhythm.   Pulmonary:      Effort: No respiratory distress.      Breath sounds: No stridor. No wheezing.   Abdominal:      General: There is no distension.      Palpations: Abdomen is soft. There is no mass.      Tenderness: There is no abdominal tenderness. There is no guarding.      Comments: No focal findings seen.   Musculoskeletal:         General: No swelling or tenderness.      Cervical back: Normal range of motion and neck supple. No rigidity or tenderness.      Comments: No CVA tenderness   Skin:     General: Skin is warm and dry.      Capillary Refill: Capillary refill takes less than 2 seconds.      Coloration: Skin is not jaundiced or pale.      Findings: No erythema or rash.   Neurological:      General: No focal deficit present.      Mental Status: She is alert and oriented to person, place, and time. Mental status is at baseline.   Psychiatric:      Comments: Affect soft flattened but appropriate           ED Course         Patient valuated in the ER.  IV established.  Patient did receive 2 L normal saline in the ER.  Patient also had labs drawn reviewed.  White count was 14.2.  Hemoglobin is 13.2.  Sodium 135 potassium 2.7.  Bicarb 26 anion gap is 4.  No other acute abnormalities noted of significance in the chemistries.  Pregnancy test negative.  Covid testing negative.  Urinalysis concerning for infection.  Initially evaluated once creatinine  present did receive Toradol along with Reglan and Benadryl.  Patient did improve feeling better then had some recurrent symptoms.  Reassessed here did receive another dose of Toradol 15 mg IV along with 5 Reglan IV and fluids.  Patient improved feeling better discussed patient regarding urinalysis also will treat with a gram of ceftriaxone and placed on Omnicef and sent home with Reglan also.    Patient agrees with plan at this point will monitor symptoms at home and return if any worsening symptoms at all.  Will follow up with MD for recheck in 2 days.      Procedures              Results for orders placed or performed during the hospital encounter of 10/04/21   Extra Red Top Tube     Status: None   Result Value Ref Range    Hold Specimen JIC    Extra Green Top (Lithium Heparin) Tube     Status: None   Result Value Ref Range    Hold Specimen JIC    Extra Purple Top Tube     Status: None   Result Value Ref Range    Hold Specimen DONE    HCG qualitative Blood     Status: Normal   Result Value Ref Range    hCG Serum Qualitative Negative Negative   Comprehensive metabolic panel     Status: Abnormal   Result Value Ref Range    Sodium 135 133 - 144 mmol/L    Potassium 3.7 3.4 - 5.3 mmol/L    Chloride 105 94 - 109 mmol/L    Carbon Dioxide (CO2) 26 20 - 32 mmol/L    Anion Gap 4 3 - 14 mmol/L    Urea Nitrogen 8 7 - 30 mg/dL    Creatinine 0.63 0.52 - 1.04 mg/dL    Calcium 9.0 8.5 - 10.1 mg/dL    Glucose 98 70 - 99 mg/dL    Alkaline Phosphatase 86 40 - 150 U/L    AST 26 0 - 45 U/L    ALT 53 (H) 0 - 50 U/L    Protein Total 8.7 6.8 - 8.8 g/dL    Albumin 3.3 (L) 3.4 - 5.0 g/dL    Bilirubin Total 0.6 0.2 - 1.3 mg/dL    GFR Estimate >90 >60 mL/min/1.73m2   Lipase     Status: Normal   Result Value Ref Range    Lipase 80 73 - 393 U/L   UA with Microscopic reflex to Culture     Status: Abnormal    Specimen: Urine, Midstream   Result Value Ref Range    Color Urine Orange (A) Colorless, Straw, Light Yellow, Yellow    Appearance Urine  Slightly Cloudy (A) Clear    Glucose Urine Negative Negative mg/dL    Bilirubin Urine Negative Negative    Ketones Urine Negative Negative mg/dL    Specific Gravity Urine 1.030 1.003 - 1.035    Blood Urine Large (A) Negative    pH Urine 5.5 5.0 - 7.0    Protein Albumin Urine 30  (A) Negative mg/dL    Urobilinogen Urine Normal Normal, 2.0 mg/dL    Nitrite Urine Negative Negative    Leukocyte Esterase Urine Small (A) Negative    Bacteria Urine Many (A) None Seen /HPF    Mucus Urine Present (A) None Seen /LPF    RBC Urine 15 (H) <=2 /HPF    WBC Urine 12 (H) <=5 /HPF    Squamous Epithelials Urine 8 (H) <=1 /HPF    Narrative    Urine Culture ordered based on laboratory criteria   CBC with platelets and differential     Status: Abnormal   Result Value Ref Range    WBC Count 14.2 (H) 4.0 - 11.0 10e3/uL    RBC Count 4.73 3.80 - 5.20 10e6/uL    Hemoglobin 13.2 11.7 - 15.7 g/dL    Hematocrit 43.0 35.0 - 47.0 %    MCV 91 78 - 100 fL    MCH 27.9 26.5 - 33.0 pg    MCHC 30.7 (L) 31.5 - 36.5 g/dL    RDW 16.5 (H) 10.0 - 15.0 %    Platelet Count 396 150 - 450 10e3/uL    % Neutrophils 81 %    % Lymphocytes 15 %    % Monocytes 3 %    % Eosinophils 0 %    % Basophils 0 %    % Immature Granulocytes 1 %    NRBCs per 100 WBC 0 <1 /100    Absolute Neutrophils 11.5 (H) 1.6 - 8.3 10e3/uL    Absolute Lymphocytes 2.1 0.8 - 5.3 10e3/uL    Absolute Monocytes 0.5 0.0 - 1.3 10e3/uL    Absolute Eosinophils 0.0 0.0 - 0.7 10e3/uL    Absolute Basophils 0.1 0.0 - 0.2 10e3/uL    Absolute Immature Granulocytes 0.1 (H) <=0.0 10e3/uL    Absolute NRBCs 0.0 10e3/uL   Asymptomatic COVID-19 Virus (Coronavirus) by PCR Nasopharyngeal     Status: Normal    Specimen: Nasopharyngeal; Swab   Result Value Ref Range    SARS CoV2 PCR Negative Negative    Narrative    Testing was performed using the Xpert Xpress SARS-CoV-2 Assay on the  Cepheid Gene-Xpert Instrument Systems. Additional information about  this Emergency Use Authorization (EUA) assay can be found via the  Lab  Guide. This test should be ordered for the detection of SARS-CoV-2 in  individuals who meet SARS-CoV-2 clinical and/or epidemiological  criteria. Test performance is unknown in asymptomatic patients. This  test is for in vitro diagnostic use under the FDA EUA for  laboratories certified under CLIA to perform high complexity testing.  This test has not been FDA cleared or approved. A negative result  does not rule out the presence of PCR inhibitors in the specimen or  target RNA in concentration below the limit of detection for the  assay. The possibility of a false negative should be considered if  the patient's recent exposure or clinical presentation suggests  COVID-19. This test was validated by the [facility]. This laboratory is certified under  the Clinical Laboratory Improvement Amendments of 1988 (CLIA-88) as  qualified to perform high complexity laboratory testing.     Peak Draw     Status: None    Narrative    The following orders were created for panel order Peak Draw.  Procedure                               Abnormality         Status                     ---------                               -----------         ------                     Extra Red Top Tube[[id number]]                               Final result               Extra Green Top (Lithium...[[id number]]                      Final result               Extra Purple Top Tube[[id number]]                            Final result                 Please view results for these tests on the individual orders.   CBC with platelets differential     Status: Abnormal    Narrative    The following orders were created for panel order CBC with platelets differential.  Procedure                               Abnormality         Status                     ---------                               -----------         ------                     CBC with platelets and d...[[id number]]  Abnormal            Final  result                 Please view results for these tests on the individual orders.     Medications   0.9% sodium chloride BOLUS (0 mLs Intravenous Stopped 10/4/21 1505)   ondansetron (ZOFRAN) injection 4 mg (4 mg Intravenous Given 10/4/21 1009)   metoclopramide (REGLAN) injection 5 mg (5 mg Intravenous Given 10/4/21 1153)   ketorolac (TORADOL) injection 15 mg (15 mg Intravenous Given 10/4/21 1150)   diphenhydrAMINE (BENADRYL) injection 25 mg (25 mg Intravenous Given 10/4/21 1155)   cefTRIAXone (ROCEPHIN) 1 g vial to attach to  mL bag for ADULTS or NS 50 mL bag for PEDS (0 g Intravenous Stopped 10/4/21 1723)   ketorolac (TORADOL) injection 15 mg (15 mg Intravenous Given 10/4/21 1552)   metoclopramide (REGLAN) injection 5 mg (5 mg Intravenous Given 10/4/21 1552)   0.9% sodium chloride BOLUS (0 mLs Intravenous Stopped 10/4/21 1723)   sodium chloride 0.9 % infusion (0 mLs  Stopped 10/4/21 1724)        Assessments & Plan (with Medical Decision Making)  26-year-old female who presents the ER with headache nausea vomiting reports of some fever similar to previous symptoms in the last couple months where she was treated for most likely viral syndrome.  Patient denies trauma otherwise no documented history of migraines.  She had generalized headache without neuro focal findings or nuchal rigidity.  Had some photophobia.  Treated with IV fluids along with Reglan Toradol Benadryl initially and then repeat Toradol and Reglan improved.  Patient's white count was slightly elevated 14,000 urinalysis concerning for infection patient given ceftriaxone IV.  Will be discharged with Reglan and Omnicef treating for urinary symptoms along with the nausea headache using Tylenol.  Pregnancy test negative.  Monitoring symptoms follow-up with MD for recheck in 2 days.  To return if worsening symptoms or any other concerns at all.  At this point patient did not display nuchal rigidity etc.  Covid testing negative.  We did not test for  influenza currently at this point.  In agreement with plan.          I have reviewed the findings, diagnosis, plan and need for follow up with the patient.    Discharge Medication List as of 10/4/2021  5:24 PM      START taking these medications    Details   cefdinir (OMNICEF) 300 MG capsule Take 1 capsule (300 mg) by mouth 2 times daily for 7 days For uti, Disp-14 capsule, R-0, Local Print      metoclopramide (REGLAN) 10 MG tablet Take 1 tablet (10 mg) by mouth 3 times daily as needed (headache and nausea vomiting), Disp-12 tablet, R-0, Local Print             Final diagnoses:   Nausea and vomiting, intractability of vomiting not specified, unspecified vomiting type   Headache disorder   Acute UTI     I, Reva Copeland, am serving as a trained medical scribe to document services personally performed by Fernandez Mena MD based on the provider's statements to me on October 4, 2021.  This document has been checked and approved by the attending provider.    I, Fernandez Mena MD, was physically present and have reviewed and verified the accuracy of this note documented by Reva Copeland, medical scribe.      Fernandez Mena MD     formerly [facility] EMERGENCY DEPARTMENT  10/4/2021    This note was created at least in part by the use of dragon voice dictation system. Inadvertent typographical errors may still exist.  Fernandez Mena MD.    Patient evaluated in the emergency department during the COVID-19 pandemic period. Careful attention to patients safety was addressed throughout the evaluation. Evaluation and treatment management was initiated with disposition made efficiently and appropriate as possible to minimize any risk of potential exposure to patient during this evaluation.              Fernandez Mena MD  10/04/21 2125      Did contact patient later this evening approximately 2145 hrs.  Discussed with family member patient is resting but feeling better is encouraging fluids keeping him down.   Discussed with family member to relay information that if any concerns if she is not feeling better she should return to the ER for further evaluation.  Family member appreciative will relay this information the patient and will closely monitor patient's symptoms and return if any concerns at all.     Fernandez Mena MD  10/04/21 2624

## 2021-10-04 NOTE — LETTER
October 4, 2021      To Whom It May Concern:      Minda Silverman was seen in our Emergency Department today, 10/04/21.  I expect her condition to improve over the next few days.  She may return to work when improved.    Sincerely,        Fernandez Mena MD

## 2021-10-05 LAB — BACTERIA UR CULT: NORMAL

## 2022-02-09 ENCOUNTER — HOSPITAL ENCOUNTER (OUTPATIENT)
Dept: ULTRASOUND IMAGING | Facility: CLINIC | Age: 27
Discharge: HOME | End: 2022-02-09
Attending: NURSE PRACTITIONER | Admitting: NURSE PRACTITIONER
Payer: COMMERCIAL

## 2022-02-09 DIAGNOSIS — Z34.81 PRENATAL CARE, SUBSEQUENT PREGNANCY, FIRST TRIMESTER: ICD-10-CM

## 2022-02-09 PROCEDURE — 76817 TRANSVAGINAL US OBSTETRIC: CPT | Mod: 26 | Performed by: RADIOLOGY

## 2022-02-09 PROCEDURE — 76801 OB US < 14 WKS SINGLE FETUS: CPT

## 2022-02-09 PROCEDURE — 76801 OB US < 14 WKS SINGLE FETUS: CPT | Mod: 26 | Performed by: RADIOLOGY

## 2022-02-23 ENCOUNTER — TRANSCRIBE ORDERS (OUTPATIENT)
Dept: MATERNAL FETAL MEDICINE | Facility: CLINIC | Age: 27
End: 2022-02-23
Payer: COMMERCIAL

## 2022-02-23 DIAGNOSIS — O26.90 PREGNANCY RELATED CONDITION, ANTEPARTUM: Primary | ICD-10-CM

## 2022-02-23 DIAGNOSIS — Z87.59 HISTORY OF PRETERM PREMATURE RUPTURE OF MEMBRANES (PPROM): ICD-10-CM

## 2022-02-23 DIAGNOSIS — E66.9 OBESITY: Primary | ICD-10-CM

## 2022-03-10 ENCOUNTER — APPOINTMENT (OUTPATIENT)
Dept: ULTRASOUND IMAGING | Facility: CLINIC | Age: 27
End: 2022-03-10
Attending: EMERGENCY MEDICINE
Payer: COMMERCIAL

## 2022-03-10 ENCOUNTER — HOSPITAL ENCOUNTER (EMERGENCY)
Facility: CLINIC | Age: 27
Discharge: HOME | End: 2022-03-10
Attending: EMERGENCY MEDICINE | Admitting: EMERGENCY MEDICINE
Payer: COMMERCIAL

## 2022-03-10 VITALS
WEIGHT: 293 LBS | DIASTOLIC BLOOD PRESSURE: 51 MMHG | SYSTOLIC BLOOD PRESSURE: 113 MMHG | OXYGEN SATURATION: 98 % | HEART RATE: 64 BPM | TEMPERATURE: 98.5 F | RESPIRATION RATE: 16 BRPM

## 2022-03-10 DIAGNOSIS — O20.0 THREATENED ABORTION: ICD-10-CM

## 2022-03-10 DIAGNOSIS — O46.90 VAGINAL BLEEDING IN PREGNANCY: ICD-10-CM

## 2022-03-10 LAB
ABO/RH(D): NORMAL
B-HCG SERPL-ACNC: ABNORMAL IU/L (ref 0–5)
BASOPHILS # BLD AUTO: 0 10E3/UL (ref 0–0.2)
BASOPHILS NFR BLD AUTO: 0 %
EOSINOPHIL # BLD AUTO: 0.2 10E3/UL (ref 0–0.7)
EOSINOPHIL NFR BLD AUTO: 3 %
ERYTHROCYTE [DISTWIDTH] IN BLOOD BY AUTOMATED COUNT: 14.8 % (ref 10–15)
HCT VFR BLD AUTO: 37.9 % (ref 35–47)
HGB BLD-MCNC: 12.5 G/DL (ref 11.7–15.7)
IMM GRANULOCYTES # BLD: 0.1 10E3/UL
IMM GRANULOCYTES NFR BLD: 1 %
LYMPHOCYTES # BLD AUTO: 2.2 10E3/UL (ref 0.8–5.3)
LYMPHOCYTES NFR BLD AUTO: 26 %
MCH RBC QN AUTO: 29.3 PG (ref 26.5–33)
MCHC RBC AUTO-ENTMCNC: 33 G/DL (ref 31.5–36.5)
MCV RBC AUTO: 89 FL (ref 78–100)
MONOCYTES # BLD AUTO: 0.5 10E3/UL (ref 0–1.3)
MONOCYTES NFR BLD AUTO: 6 %
NEUTROPHILS # BLD AUTO: 5.3 10E3/UL (ref 1.6–8.3)
NEUTROPHILS NFR BLD AUTO: 64 %
NRBC # BLD AUTO: 0 10E3/UL
NRBC BLD AUTO-RTO: 0 /100
PLATELET # BLD AUTO: 360 10E3/UL (ref 150–450)
RBC # BLD AUTO: 4.27 10E6/UL (ref 3.8–5.2)
SPECIMEN EXPIRATION DATE: NORMAL
WBC # BLD AUTO: 8.3 10E3/UL (ref 4–11)

## 2022-03-10 PROCEDURE — 76801 OB US < 14 WKS SINGLE FETUS: CPT

## 2022-03-10 PROCEDURE — 36415 COLL VENOUS BLD VENIPUNCTURE: CPT | Performed by: EMERGENCY MEDICINE

## 2022-03-10 PROCEDURE — 99284 EMERGENCY DEPT VISIT MOD MDM: CPT | Mod: 25 | Performed by: EMERGENCY MEDICINE

## 2022-03-10 PROCEDURE — 99285 EMERGENCY DEPT VISIT HI MDM: CPT | Performed by: EMERGENCY MEDICINE

## 2022-03-10 PROCEDURE — 85025 COMPLETE CBC W/AUTO DIFF WBC: CPT | Performed by: EMERGENCY MEDICINE

## 2022-03-10 PROCEDURE — 86901 BLOOD TYPING SEROLOGIC RH(D): CPT | Performed by: EMERGENCY MEDICINE

## 2022-03-10 PROCEDURE — 84702 CHORIONIC GONADOTROPIN TEST: CPT | Performed by: EMERGENCY MEDICINE

## 2022-03-10 RX ORDER — DIPHENHYDRAMINE HYDROCHLORIDE 25 MG/1
CAPSULE ORAL
COMMUNITY
Start: 2022-02-23

## 2022-03-10 RX ORDER — PNV NO.95/FERROUS FUM/FOLIC AC 28MG-0.8MG
1 TABLET ORAL DAILY
COMMUNITY
Start: 2022-02-02

## 2022-03-10 ASSESSMENT — ENCOUNTER SYMPTOMS
FLANK PAIN: 0
HEADACHES: 0
ABDOMINAL PAIN: 0
SHORTNESS OF BREATH: 0
PSYCHIATRIC NEGATIVE: 1
NAUSEA: 0
FEVER: 0
VOMITING: 0
EYES NEGATIVE: 1
MUSCULOSKELETAL NEGATIVE: 1

## 2022-03-10 NOTE — LETTER
March 10, 2022      To Whom It May Concern:      Minda Pearce Herrera was seen in our Emergency Department today, 03/10/22. Please excuse her from work today.    Sincerely,        Joanna Terry MD

## 2022-03-10 NOTE — ED TRIAGE NOTES
Patient reports vaginal bleeding started 1 month ago but went away that same day. Bleeding started yesterday, with wiping. 10 weeks pregnant. No pain reported.

## 2022-03-10 NOTE — DISCHARGE INSTRUCTIONS
Your ultrasound is reassuring.  The fetus is 10 weeks and 4 days with a good heart rate.  Follow-up with your OB/GYN physician within a week for reevaluation.  Return to the ED for heavy vaginal bleeding--soaking more than one super maxi pad an hour for more than 2 hours, or for abdominal pain.

## 2022-03-10 NOTE — ED PROVIDER NOTES
ED Provider Note  [facility]      History     Chief Complaint   Patient presents with     Vaginal Bleeding     started yesterday, with wiping. 10 weeks pregnant     HPI  Minda Silverman is a 26 year old female  who is approximately 10 weeks pregnant who presents with vaginal bleeding that started yesterday.  The patient states that the bleeding is minimal.  She is having blood when wiping herself after using the toilet.  Symptoms started yesterday in the morning.  There is no associated abdominal pain or cramping.  She has had no fevers.  She is concerned because she had a miscarriage with one pregnancy and her living daughter was born prematurely at age 26 weeks.  Social: Here with sister-in -law    Past Medical History  Past Medical History:   Diagnosis Date     NO ACTIVE PROBLEMS      History reviewed. No pertinent surgical history.  metoclopramide (REGLAN) 10 MG tablet  Prenatal Vit-Fe Fumarate-FA (PRENATAL VITAMINS) 28-0.8 MG TABS  VITAMIN  B-6 25 MG tablet      No Known Allergies  Family History  No family history on file.  Social History   Social History     Tobacco Use     Smoking status: Current Every Day Smoker     Years: 0.20     Smokeless tobacco: Never Used   Substance Use Topics     Alcohol use: No     Drug use: Never      Past medical history, past surgical history, medications, allergies, family history, and social history were reviewed with the patient. No additional pertinent items.       Review of Systems   Constitutional: Negative for fever.   Eyes: Negative.    Respiratory: Negative for shortness of breath.    Cardiovascular: Negative for chest pain.   Gastrointestinal: Negative for abdominal pain, nausea and vomiting.   Genitourinary: Positive for vaginal bleeding. Negative for flank pain, vaginal discharge and vaginal pain.   Musculoskeletal: Negative.    Skin: Negative for rash.   Neurological: Negative for headaches.   Psychiatric/Behavioral: Negative.     All other systems reviewed and are negative.        Physical Exam   BP: 128/63  Pulse: 69  Temp: 98.5  F (36.9  C)  Resp: 14  Weight: (!) 161 kg (355 lb)  SpO2: 97 % /51   Pulse 64   Temp 98.5  F (36.9  C) (Oral)   Resp 16   Wt (!) 161 kg (355 lb)   LMP 12/10/2021 (Approximate)   SpO2 98%    Physical Exam  Physical Exam   Constitutional:   well nourished, well developed, resting comfortably   HENT:   Head: Normocephalic and atraumatic.   Eyes: Conjunctivae are normal. Pupils are equal, round, and reactive to light.   Cardiovascular: regular rate and rhythm without murmurs or gallops  Pulmonary/Chest: Clear to auscultation bilaterally, with no wheezes or retractions. No respiratory distress.  GI: Soft with good bowel sounds.  Obese, nontender non-distended, with no guarding, no rebound, no peritoneal signs.   Back:  No bony or CVA tenderness   Musculoskeletal:  no edema  Skin: Skin is warm and dry. No rash noted.   Neurological: alert and oriented to person, place, and time. Nonfocal exam  Psychiatric:  normal mood and affect.   ED Course      Procedures       The medical record was reviewed and interpreted.  Current labs reviewed and interpreted.  Current images reviewed and interpreted: see below.              Results for orders placed or performed during the hospital encounter of 03/10/22   US OB < 14 Weeks Single     Status: None    Narrative    US OB < 14 WEEKS SINGLE-TRANSABDOMINAL 3/10/2022 9:29 AM    CLINICAL HISTORY: 10 weeks pregnant, vaginal bleeding.    TECHNIQUE: Transabdominal scans were performed. Endovaginal ultrasound  was performed to better visualize the embryo.    COMPARISON: None.    FINDINGS:  UTERUS: Single normal appearing intrauterine gestation sac.  CRL: measures 36 mm, equals 10 weeks 4 days.  FETAL HEART RATE: 160 bpm.  AMNIOTIC FLUID: Normal.  PLACENTA: Not yet formed. No evidence for sub-chorionic hemorrhage.    RIGHT OVARY: Normal.  LEFT OVARY: Normal.      Impression     IMPRESSION:   Single living intrauterine gestation at 10 weeks 4 days, EDC  10/2/2022.      FAY ARREDONDO MD         SYSTEM ID:  [id number]   HCG quantitative pregnancy (blood)     Status: Abnormal   Result Value Ref Range    hCG Quantitative 54,620 (H) 0 - 5 IU/L   CBC with platelets and differential     Status: None   Result Value Ref Range    WBC Count 8.3 4.0 - 11.0 10e3/uL    RBC Count 4.27 3.80 - 5.20 10e6/uL    Hemoglobin 12.5 11.7 - 15.7 g/dL    Hematocrit 37.9 35.0 - 47.0 %    MCV 89 78 - 100 fL    MCH 29.3 26.5 - 33.0 pg    MCHC 33.0 31.5 - 36.5 g/dL    RDW 14.8 10.0 - 15.0 %    Platelet Count 360 150 - 450 10e3/uL    % Neutrophils 64 %    % Lymphocytes 26 %    % Monocytes 6 %    % Eosinophils 3 %    % Basophils 0 %    % Immature Granulocytes 1 %    NRBCs per 100 WBC 0 <1 /100    Absolute Neutrophils 5.3 1.6 - 8.3 10e3/uL    Absolute Lymphocytes 2.2 0.8 - 5.3 10e3/uL    Absolute Monocytes 0.5 0.0 - 1.3 10e3/uL    Absolute Eosinophils 0.2 0.0 - 0.7 10e3/uL    Absolute Basophils 0.0 0.0 - 0.2 10e3/uL    Absolute Immature Granulocytes 0.1 <=0.4 10e3/uL    Absolute NRBCs 0.0 10e3/uL   ABO and Rh     Status: None   Result Value Ref Range    ABO/RH(D) O POS     SPECIMEN EXPIRATION DATE [id number]    CBC with platelets differential     Status: None    Narrative    The following orders were created for panel order CBC with platelets differential.  Procedure                               Abnormality         Status                     ---------                               -----------         ------                     CBC with platelets and d...[[id number]]                      Final result                 Please view results for these tests on the individual orders.     Medications - No data to display     Assessments & Plan (with Medical Decision Making)       I have reviewed the nursing notes.   Emergency Department course:  The patient was seen and examined at 0835 am.     Laboratory studies show a  hemoglobin of 12.5.  hCG is 54,620.  The patient is O+.  OB ultrasound shows                                                                  IMPRESSION:   Single living intrauterine gestation at 10 weeks 4 days, EDC  10/2/2022.    Minda Silverman is a 26 year old female who presents with minimal vaginal bleeding in early pregnancy.  Her vital signs, physical examination and laboratory studies are unremarkable.  OB ultrasound is reassuring.  Her vaginal bleeding is minimal.  This may represent threatened . I believe she is safe to be discharged home.  I discussed threatened  precautions with the patient prior to discharge and she was given bleeding precautions on discharge.  She should follow-up with her OB/GYN physician for reevaluation within 1 to 2 weeks.  I counseled the patient in my usual and standard fashion for threatened  and reasons to return to the Emergency Department.   I have reviewed the findings, diagnosis, plan and need for follow up with the patient.    Discharge Medication List as of 3/10/2022 10:43 AM          Final diagnoses:   Threatened    Vaginal bleeding in pregnancy       --This note was created in part by the use of Dragon voice recognition dictation system. Inadvertent grammatical errors and typographical errors may still exist.  MD Joanna Boyce  [facility] EMERGENCY DEPARTMENT  3/10/2022     Joanna Terry MD  03/10/22 1145

## 2022-03-20 ENCOUNTER — HEALTH MAINTENANCE LETTER (OUTPATIENT)
Age: 27
End: 2022-03-20

## 2022-03-21 ENCOUNTER — PRE VISIT (OUTPATIENT)
Dept: MATERNAL FETAL MEDICINE | Facility: CLINIC | Age: 27
End: 2022-03-21
Payer: COMMERCIAL

## 2022-03-24 NOTE — PROGRESS NOTES
[facility]  Genetic Counseling Consult    Patient: Minda Silverman YOB: 1995   Date of Service: 2022      Minda Silverman was seen at [facility] for genetic consultation and MFM consultation to discuss the options for screening and testing for fetal chromosome abnormalities The patient was unaccompanied to today's consult.        Impression/Plan:   1.  Minda has not had genetic screening in the current pregnancy. After a review of the benefits and limitations of screening and diagnostic prenatal testing, Minda declined proceeding with serum screening and invasive testing during today's consult. She remains aware that testing is available to her in the future.     2.  Maternal serum AFP (single marker screen) is recommended after 15 weeks to screen for open neural tube defects. A quad screen should not be performed.    3.  Minda had an NT ultrasound today. Please see the ultrasound report for additional details.     4.  Minda had an MFM consultation with Dr. Jessica Zambrano today to review her history of PPROM with PTL, history of cervical insufficiency, and obesity. Please see Dr. Zambrano's documentation for additional details regarding pregnancy management.     Pregnancy History:   /Parity:      Age at Delivery: 27 years old  OSIEL: 10/2/2022, by US on 22 at 6w3d   Gestational Age: 12w5d      Today, the patient declines a history of significant complications or exposures in the current pregnancy. She reports experiencing mild vaginal bleeding around 10 weeks gestation for which she went to the ER for. US at the ER did not identify a subchorionic hemorrhage or other concerns related to the bleeding. She has not had a recurrent bleeding episode since, and denies a history of cramping/fever or abnormal fluid leakage.     The patient's pregnancy history is significant for one   at 29w3d  "due to PPROM at 27 weeks gestation and cervical insufficiency. She also has a history of one SAB in the first trimester in 2014.     The father of the current pregnancy is a different partner, Dm, from Minda's previous two pregnancies.      Medical History:   The reported medical history is significant for an elevated BMI (>58 kg/m2). Please see Dr. Zambrano's documentation for details regarding management recommendations in the current pregnancy.        Family History:   A three-generation pedigree was obtained today and scanned in under the \"media\" tab in the patient's chart. The reported family history is negative for multiple miscarriages, stillbirths, birth defects, intellectual disabilities, known genetic conditions, and consanguinity.       Carrier Screening:   The patient reports that the father of the pregnancy has  ancestry:      We reviewed the clinical features, autosomal recessive inheritance, and options for carrier screening and  screening for hemoglobinopathies.    The patient reports that she is of  ([place]) ancestry:       Expanded carrier screening for mutations in a large panel of genes associated with autosomal recessive conditions including cystic fibrosis, spinal muscular atrophy, and others, is now available.      Carrier screening was not discussed today due to time constraints, but remains available to the patient and her partner moving forward.        Risk Assessment for Chromosome Conditions:   We explained that the risk for fetal chromosome abnormalities increases with maternal age. The patient declined a detailed discussion of her specific age-related risks during today's consult.      - At age 26 at midtrimester, the risk to have a baby with Down syndrome is 1 in 928.     - At age 26 at midtrimester, the risk to have a baby with any chromosome abnormality is 1 in 464.     - At age 27 at delivery, the risk to have a baby with Down syndrome is 1 in 1053. "     - At age 27 at delivery, the risk to have a baby with any chromosome abnormality is 1 in 435.        Testing Options:   We discussed the following options:   First trimester screening    First trimester ultrasound with nuchal translucency and nasal bone assessments, maternal plasma hCG, ELE-A, and AFP measurement    Screens for fetal trisomy 21, trisomy 13, and trisomy 18    Cannot screen for open neural tube defects; maternal serum AFP after 15 weeks is recommended       Non-invasive Prenatal Testing (NIPT)    Maternal plasma cell-free DNA testing; first trimester ultrasound with nuchal translucency and nasal bone assessment is recommended, when appropriate    Screens for fetal trisomy 21, trisomy 13, trisomy 18, and sex chromosome aneuploidy    Cannot screen for open neural tube defects; maternal serum AFP after 15 weeks is recommended       Genetic Amniocentesis    Invasive procedure typically performed in the second trimester by which amniotic fluid is obtained for the purpose of chromosome analysis and/or other prenatal genetic analysis    Diagnostic results; >99% sensitivity for fetal chromosome abnormalities    AFAFP measurement tests for open neural tube defects      NT Ultrasound     Assessment for the thickness of the nuchal translucency and the fetus' nasal bone performed between 25f0l-86w2j gestation    ~70% aneuploidy detection      We reviewed the benefits and limitations of this testing.  Screening tests provide a risk assessment specific to the pregnancy for certain fetal chromosome abnormalities, but cannot definitively diagnose or exclude a fetal chromosome abnormality.  Follow-up genetic counseling and consideration of diagnostic testing is recommended with any abnormal screening result.     Diagnostic tests carry inherent risks- including risk of miscarriage- that require careful consideration.  These tests can detect fetal chromosome abnormalities with greater than 99% certainty.  Results  can be compromised by maternal cell contamination or mosaicism, and are limited by the resolution of cytogenetic G-banding technology.  There is no screening nor diagnostic test that can detect all forms of birth defects or mental disability.     It was a pleasure to be involved with Minda Pearce Herrera s care. Face-to-face time of the meeting was 20 minutes.      Yvette Muniz MS, [facility]  Genetic Counselor  [facility]  Maternal Fetal Medicine  Ph: [phone]   Pager: [phone]

## 2022-03-25 ENCOUNTER — HOSPITAL ENCOUNTER (OUTPATIENT)
Dept: ULTRASOUND IMAGING | Facility: CLINIC | Age: 27
Discharge: HOME | End: 2022-03-25
Attending: OBSTETRICS & GYNECOLOGY
Payer: COMMERCIAL

## 2022-03-25 ENCOUNTER — OFFICE VISIT (OUTPATIENT)
Dept: MATERNAL FETAL MEDICINE | Facility: CLINIC | Age: 27
End: 2022-03-25
Attending: OBSTETRICS & GYNECOLOGY
Payer: COMMERCIAL

## 2022-03-25 VITALS
DIASTOLIC BLOOD PRESSURE: 63 MMHG | OXYGEN SATURATION: 100 % | SYSTOLIC BLOOD PRESSURE: 124 MMHG | RESPIRATION RATE: 18 BRPM | HEART RATE: 64 BPM

## 2022-03-25 DIAGNOSIS — Z36.9 PRENATAL SCREENING ENCOUNTER: Primary | ICD-10-CM

## 2022-03-25 DIAGNOSIS — Z87.59 HISTORY OF PRETERM PREMATURE RUPTURE OF MEMBRANES (PPROM): ICD-10-CM

## 2022-03-25 DIAGNOSIS — O99.211 OBESITY AFFECTING PREGNANCY IN FIRST TRIMESTER: ICD-10-CM

## 2022-03-25 DIAGNOSIS — E66.9 OBESITY: ICD-10-CM

## 2022-03-25 DIAGNOSIS — O09.891 HISTORY OF PREMATURE DELIVERY, CURRENTLY PREGNANT, FIRST TRIMESTER: ICD-10-CM

## 2022-03-25 DIAGNOSIS — O99.211 OBESITY COMPLICATING PREGNANCY, FIRST TRIMESTER: ICD-10-CM

## 2022-03-25 PROCEDURE — 76801 OB US < 14 WKS SINGLE FETUS: CPT | Mod: 26 | Performed by: OBSTETRICS & GYNECOLOGY

## 2022-03-25 PROCEDURE — 76813 OB US NUCHAL MEAS 1 GEST: CPT

## 2022-03-25 PROCEDURE — 99203 OFFICE O/P NEW LOW 30 MIN: CPT | Mod: 25 | Performed by: OBSTETRICS & GYNECOLOGY

## 2022-03-25 PROCEDURE — 96040 HC GENETIC COUNSELING, EACH 30 MINUTES: CPT | Performed by: GENETIC COUNSELOR, MS

## 2022-03-25 RX ORDER — DOCUSATE SODIUM 100 MG/1
CAPSULE, LIQUID FILLED ORAL
COMMUNITY
Start: 2022-02-02

## 2022-03-25 ASSESSMENT — PAIN SCALES - GENERAL: PAINLEVEL: NO PAIN (0)

## 2022-03-25 NOTE — NURSING NOTE
Minda seen in clinic today at 12w5d gestation for GC/NT/MFM Consult d/t hx cervical insufficiency, Hx PPROM 27 weeks with PTD at 29 weeks, obesity. VS obtained. Meds and allergies reviewed. Dr. Zambrano met with pt and discussed POC. Plan for cervical US surveillance q 2 weeks starting at 16 weeks, with complete 2/3 at 16 weeks and L2 at 20 weeks. Future visits scheduled at . Pt discharged stable and ambulatory.

## 2022-03-27 NOTE — PROGRESS NOTES
Maternal-Fetal Medicine Consultation    Minda Silverman  : 1995  MRN: [id number]    REFERRAL:  Minda Silverman is a 26 year old sent from Dr. Minor at the [facility] for consultation regarding her history of prior  delivery and obesity.    HPI:  Minda Silverman is a 26 year old  at 12w6d by 6w 3d ultrasound who presents for consultation regarding her pregnancy complicated by a history of  birth and obesity. She was incidentally noted to have cervical shortening with dilation at an ultrasound to evaluate fetal anatomy at 26 4/7 weeks gestation. She was admitted to the hospital and received a course of betamethasone at that time. During the hospitalization on exam she was noted to be 2 cm dilated with a bulging bag of water with bag protruding with valsalva in the absence of labs. She was subsequently discharged home. Minda was readmitted to the hospital at 27 4/7 weeks gestation with PPROM. She subsequently developed triple I and underwent induction of labor and had an  at 29 3/7 weeks gestation. She presents today to discuss management of the current pregnancy.    Pregnancy complicated by:  1) Obesity  2) History of PPROM at 27 4/7 weeks with  delivery at 29 3/7 weeks    Obstetrics History:  OB History    Para Term  AB Living   3 1 0 1 1 1   SAB IAB Ectopic Multiple Live Births   1 0 0 0 1      # Outcome Date GA Lbr Chris/2nd Weight Sex Delivery Anes PTL Lv   3 Current            2  17 29w3d / 00:07 1.156 kg (2 lb 8.8 oz) F Vag-Spont EPI Y MARIA TERESA      Name: RICKBABY GIRL      Apgar1: 6  Apgar5: 7   1 SAB  14w0d            Past Surgical History:  1) Class III Obesity    Past Surgical History:  None    Current Medications:  Prior to Admission medications    Medication Sig Last Dose Taking? Auth Provider   docusate sodium (COLACE) 100 MG capsule Take 1 capsule by mouth twice a day as needed for constipation Taking Yes  Reported, Patient   Prenatal Vit-Fe Fumarate-FA (PRENATAL VITAMINS) 28-0.8 MG TABS Take 1 tablet by mouth daily Taking Yes Reported, Patient   metoclopramide (REGLAN) 10 MG tablet Take 1 tablet (10 mg) by mouth 3 times daily as needed (headache and nausea vomiting)  Patient not taking: Reported on 3/25/2022 Not Taking  Fernandez Mena MD   VITAMIN  B-6 25 MG tablet TAKE ONE TABLET BY MOUTH THREE TIMES DAILY AS NEEDED FOR NAUSEA  Patient not taking: Reported on 3/25/2022 Not Taking  Reported, Patient     Allergies:  Patient has no known allergies.    Social History:   Livers with her brother and his family, as well as her daughter. Stays at home to care for her daughter. No alcohol, drugs or tobacco.    ROS:  10-point ROS negative except as in HPI     PHYSICAL EXAM:  /63 (BP Location: Left arm, Patient Position: Sitting, Cuff Size: Adult Large)   Pulse 64   Resp 18   LMP 12/10/2021 (Approximate)   SpO2 100%     Gen: NAD, well appearing  Chest: Non-labored breathing  Abdomen: gravid    Genetic Testing:   Declined    Ultrasounds:   Please see the imaging tab for details of the ultrasound performed today.    ASSESSMENT:  26 year old y.o.  at 12w6d by 6w 3d ultrasound presents for consultation.    # History of Spontaneous  Birth (PTB)   Today we discussed the incidence and epidemiology of  birth (PTB).  There are multiple etiologies of spontaneous PTB, including but not limited to infection, bleeding, uterine distension, and stress. We also discussed the concept of cervical insufficiency which is another cause of  birth and is classically described as painless cervical dilation. However, most spontaneous  deliveries occur in patients with no risk factors. We discussed that in her case it is possible that there was a component of cervical insufficiency given the cervical dilation in the absence of labor that was noted at 26 weeks gestation in her prior pregnancy.     A  history of prior  delivery is a significant risk factor for recurrence in a subsequent pregnancy.  Recurrent  delivery has been linked to maternal ethnicity, genitourinary infection, and especially gestational age at the first  delivery: the earlier the delivery, the greater the likelihood of recurrence.  We discussed that recurrent  birth can happen at the same gestational age as a prior  birth but that it cannot be predicted and it could recur at an earlier gestational age.      We discussed the increased  morbidity and mortality with prematurity which is the rationale for trying to prevent recurrent  birth.  Given her history of possible cervical insufficiency we discussed the option of a history indicated cerclage. We also discussed alternatively performing serial cervical length surveillance. We follow cervical lengths every two weeks from 16 to 24 weeks in women with a history of PTB; an ultrasound indicated cerclage may be considered if shortening is detected prior to 23-24 weeks.    We discussed the data regarding the use of weekly intramuscular injections of 17 hydroxyprogesterone caproate (17-OHP) supplementation as a means to modify the risk of recurrent  birth.  A multicenter, double-blind randomized controlled trial found a 34% reduction in recurrent  birth <37 weeks (Andrea et al, 2003) with the use of 17-OHP .  This trial also found a significant reduction in recurrent  birth <35 and <32 weeks.  A more recent international, double-blind randomized controlled trial (PROLONG) found no reduction in the rate of recurrent  birth <35 weeks (Barron et al, 2019), calling into question the established use of 17-OHP.  Importantly, both studies indicate that 17-OHP is safe, at least in the short term.       The Society for Maternal Fetal Medicine interprets these disparate results as possibly partially related to the different  populations in the two studies (59% black in [place] versus 90% white in [place]; 50%  in [place] versus 90% in [place]; 20% tobacco use in [place] versus 8% in [place]; 32% had more than one prior PTB in [place] versus 12% in [place]; 91% with at least on additional risk factor for PTB in [place] versus 48% in [place]).  The current conclusion of [facility] is that it is reasonable to offer 17-OHP to women with a profile more representative of the very high risk patient, but that all women at risk of recurrent  birth should have a discussion of the risks/benefits and undergo a shared decision-making process.    After the publication of the [place] (PROLONG) study an FDA Advisory Committee recommended withdrawing Brooke (17 - OHP) off the market.  The FDA decision is still pending.  [facility] did not change their position.     After a discussion of the above Ms. Ramses Silverman stated that she will consider 17 - OHP, but is undecided at this time. She does, however, wish to scheduled cervical length surveillance and declines history indicated cerclage.    # Obesity  Pregnancy complications are increased in obese women, even in the absence of other overt comorbid condition.  These risks include, but are not limited to, gestational diabetes, gestational hypertension, preeclampsia,  delivery, miscarriage, congenital anomaly, and stillbirth.  The risk of stillbirth increases with increasing BMI and gestational age. Obese women are less likely than their normal weight counterparts to enter spontaneous labor.  For these reasons, determining the optimal timing of delivery for obese women becomes a balance of decreasing preventable stillbirth risk with minimizing  delivery risk.  This balance is further complicated by the fact that obese women are more likely to have a failed induction of labor than normal weight women.  However, taking all these variables into account there appears to be a benefit to  elective term induction of labor for women obesity.     Venous thromboembolism in pregnancy is responsible for up to 9-10% of maternal deaths in developed countries, with many of these deaths (>90%) having an element of preventability. Obesity is a risk factor for DVT throughout pregnancy, increasing risk between 2.5-3 times. Throughout pregnancy, risk for VTE is highest during times of immobility, such as surgery or trauma, and antepartum admission.  Risk is highest after delivery, and remains high in the 6 weeks postpartum.  Upon admission to the hospital during pregnancy for non-delivery indications, women should undergo evaluation for risk VTE within 24 hours.  Antepartum admission during pregnancy confers an 18 fold risk of VTE over baseline in otherwise low risk women, which approaches the risk of women with a high risk thrombophilia or prior VTE.  Due to this increased risk, VTE prophylaxis is recommended.  For women who are obese, and for whom admission of at least 72 hours is anticipated, mechanical prophylaxis while in bed along with maintenance of mobility is encouraged.  Pharmacologic prophylaxis is also recommended in this population.      Extremely high BMI (BMI >50) affects stillbirth differently compared with lower BMI.  The rate at which risk for stillbirth increased for overweight and Class I and II obese women are linear from 30-42 weeks  gestation.  In contrast, among class III obese women and those with BMI >50, the risks appear to be nonlinear with time; the risk of stillbirth escalates faster with increasing gestational age in these extreme classes.  These findings remained consistent after adjustment for known confounding risk factors such as chronic hypertension and pregestational diabetes.     RECOMMENDATIONS:  -Pharmacologic anticoagulation should be considered in women with BMI ?30 admitted antepartum for an expected stay of 72 hours or more.  -If  delivery for any indication then  should receive pharmacologic anticoagulation within 24 hours of delivery for the course of hospitalization. If vaginal delivery with an anticipated hospital stay of 72 hours then should receive pharmacologic thromboprophylaxis for the duration of the postpartum hospitalization. Recommended dosing is Lovenox 40 mg subcutaneous every 12 hours given BMI of 40 or greater.  -Comprehensive ultrasound  -Serial ultrasounds recommended to monitor fetal growth (every 4-6 weeks).  - surveillance with weekly BPP beginning at 34 weeks gestation, or earlier if otherwise clinically indicated.  -Delivery is recommended at 39 weeks gestation.  -Early GCT. If passes then repeat gestational diabetes screening is recommended at 24-28 weeks gestation.  -Weight gain of 11-20 pounds during pregnancy.  -Begin aspirin 81 mg daily to reduce the risk for preeclampsia.  -Consider 17-hydroxyprogesterone caproate 250 mg IM weekly from 16-36 weeks' gestation.  -Baseline transvaginal cervical length at 16 weeks' gestation with measurements every 2 weeks until 23-24 weeks. The frequency of cervical length screening should increased to weekly if the cervical length is <3cm, but >2.5 cm.     -Ultrasound-indicated cerclage should be considered if shortening <25 mm is diagnosed <24 weeks.  -Administration of  corticosteroids if the patient is deemed to be at increased risk of imminent  delivery.  -Urine culture every trimester with aggressive treatment of bacteriuria.  -Clinical evaluation of  labor symptoms.      Jessica Zambrano MD  Specialist in Maternal-Fetal Medicine    2022 , 8:35 PM      Thank you for the opportunity to participate in the care of Ms. Minda Silverman. Please do not hesitate to contact us if you may have any questions or concerns.    I spent 10 minutes prior to the visit preparing to see the patient (reviewing medical records and tests).  I spent 25 minutes face-face-to-face with the  patient during the visit with the majority (>50%) spent on counseling and coordination of care with the patient and/or family members.  I spent 5 minutes after the visit with the patient documenting the visit in the electronic health record and/or communicating with other health care professionals and/or care coordination.  Total time spent on today s date of service: 40 minutes.

## 2022-04-26 ENCOUNTER — OFFICE VISIT (OUTPATIENT)
Dept: MATERNAL FETAL MEDICINE | Facility: CLINIC | Age: 27
End: 2022-04-26
Attending: OBSTETRICS & GYNECOLOGY
Payer: COMMERCIAL

## 2022-04-26 ENCOUNTER — HOSPITAL ENCOUNTER (OUTPATIENT)
Dept: ULTRASOUND IMAGING | Facility: CLINIC | Age: 27
Discharge: HOME | End: 2022-04-26
Attending: OBSTETRICS & GYNECOLOGY
Payer: COMMERCIAL

## 2022-04-26 DIAGNOSIS — O09.891 HISTORY OF PREMATURE DELIVERY, CURRENTLY PREGNANT, FIRST TRIMESTER: ICD-10-CM

## 2022-04-26 DIAGNOSIS — O99.211 OBESITY COMPLICATING PREGNANCY, FIRST TRIMESTER: ICD-10-CM

## 2022-04-26 DIAGNOSIS — O09.292 HISTORY OF PREMATURE RUPTURE OF MEMBRANES IN PREVIOUS PREGNANCY, CURRENTLY PREGNANT IN SECOND TRIMESTER: Primary | ICD-10-CM

## 2022-04-26 PROCEDURE — 76817 TRANSVAGINAL US OBSTETRIC: CPT

## 2022-04-26 PROCEDURE — 76817 TRANSVAGINAL US OBSTETRIC: CPT | Mod: 26 | Performed by: OBSTETRICS & GYNECOLOGY

## 2022-04-26 NOTE — PROGRESS NOTES
"Please see \"Imaging\" tab under \"Chart Review\" for details of today's US at the [facility].    Kodak Alvarez MD  Maternal-Fetal Medicine      "

## 2022-05-07 ENCOUNTER — ANESTHESIA EVENT (OUTPATIENT)
Dept: OBGYN | Facility: CLINIC | Age: 27
End: 2022-05-07
Payer: COMMERCIAL

## 2022-05-07 ENCOUNTER — ANESTHESIA (OUTPATIENT)
Dept: OBGYN | Facility: CLINIC | Age: 27
End: 2022-05-07
Payer: COMMERCIAL

## 2022-05-07 ENCOUNTER — HOSPITAL ENCOUNTER (INPATIENT)
Facility: CLINIC | Age: 27
LOS: 1 days | Discharge: HOME | End: 2022-05-08
Attending: EMERGENCY MEDICINE | Admitting: OBSTETRICS & GYNECOLOGY
Payer: COMMERCIAL

## 2022-05-07 DIAGNOSIS — D62 ANEMIA DUE TO BLOOD LOSS, ACUTE: Primary | ICD-10-CM

## 2022-05-07 DIAGNOSIS — O03.9 SPONTANEOUS ABORTION: ICD-10-CM

## 2022-05-07 DIAGNOSIS — Z20.822 LAB TEST NEGATIVE FOR COVID-19 VIRUS: ICD-10-CM

## 2022-05-07 LAB
ABO/RH(D): NORMAL
ALBUMIN UR-MCNC: 50 MG/DL
ANTIBODY SCREEN: NEGATIVE
APPEARANCE UR: CLEAR
BASOPHILS # BLD AUTO: 0 10E3/UL (ref 0–0.2)
BASOPHILS NFR BLD AUTO: 0 %
BILIRUB UR QL STRIP: NEGATIVE
COLOR UR AUTO: ABNORMAL
EOSINOPHIL # BLD AUTO: 0.2 10E3/UL (ref 0–0.7)
EOSINOPHIL NFR BLD AUTO: 2 %
ERYTHROCYTE [DISTWIDTH] IN BLOOD BY AUTOMATED COUNT: 14.6 % (ref 10–15)
GLUCOSE UR STRIP-MCNC: NEGATIVE MG/DL
HCT VFR BLD AUTO: 36 % (ref 35–47)
HGB BLD-MCNC: 10.5 G/DL (ref 11.7–15.7)
HGB BLD-MCNC: 12 G/DL (ref 11.7–15.7)
HGB UR QL STRIP: ABNORMAL
HOLD SPECIMEN: NORMAL
IMM GRANULOCYTES # BLD: 0.1 10E3/UL
IMM GRANULOCYTES NFR BLD: 1 %
KETONES UR STRIP-MCNC: NEGATIVE MG/DL
LEUKOCYTE ESTERASE UR QL STRIP: ABNORMAL
LYMPHOCYTES # BLD AUTO: 2.4 10E3/UL (ref 0.8–5.3)
LYMPHOCYTES NFR BLD AUTO: 22 %
MCH RBC QN AUTO: 30.3 PG (ref 26.5–33)
MCHC RBC AUTO-ENTMCNC: 33.3 G/DL (ref 31.5–36.5)
MCV RBC AUTO: 91 FL (ref 78–100)
MONOCYTES # BLD AUTO: 0.6 10E3/UL (ref 0–1.3)
MONOCYTES NFR BLD AUTO: 5 %
MUCOUS THREADS #/AREA URNS LPF: PRESENT /LPF
NEUTROPHILS # BLD AUTO: 7.9 10E3/UL (ref 1.6–8.3)
NEUTROPHILS NFR BLD AUTO: 70 %
NITRATE UR QL: NEGATIVE
NRBC # BLD AUTO: 0 10E3/UL
NRBC BLD AUTO-RTO: 0 /100
PH UR STRIP: 6.5 [PH] (ref 5–7)
PLATELET # BLD AUTO: 337 10E3/UL (ref 150–450)
RBC # BLD AUTO: 3.96 10E6/UL (ref 3.8–5.2)
RBC URINE: >182 /HPF
SARS-COV-2 RNA RESP QL NAA+PROBE: NEGATIVE
SP GR UR STRIP: 1.02 (ref 1–1.03)
SPECIMEN EXPIRATION DATE: NORMAL
SQUAMOUS EPITHELIAL: 3 /HPF
UROBILINOGEN UR STRIP-MCNC: NORMAL MG/DL
WBC # BLD AUTO: 11.1 10E3/UL (ref 4–11)
WBC URINE: >182 /HPF

## 2022-05-07 PROCEDURE — 59160 D & C AFTER DELIVERY: CPT | Mod: GC | Performed by: OBSTETRICS & GYNECOLOGY

## 2022-05-07 PROCEDURE — 250N000009 HC RX 250: Performed by: STUDENT IN AN ORGANIZED HEALTH CARE EDUCATION/TRAINING PROGRAM

## 2022-05-07 PROCEDURE — 360N000075 HC SURGERY LEVEL 2, PER MIN: Performed by: OBSTETRICS & GYNECOLOGY

## 2022-05-07 PROCEDURE — 96374 THER/PROPH/DIAG INJ IV PUSH: CPT | Performed by: EMERGENCY MEDICINE

## 2022-05-07 PROCEDURE — 86901 BLOOD TYPING SEROLOGIC RH(D): CPT | Performed by: STUDENT IN AN ORGANIZED HEALTH CARE EDUCATION/TRAINING PROGRAM

## 2022-05-07 PROCEDURE — 120N000002 HC R&B MED SURG/OB UMMC

## 2022-05-07 PROCEDURE — 250N000011 HC RX IP 250 OP 636: Performed by: OBSTETRICS & GYNECOLOGY

## 2022-05-07 PROCEDURE — 36415 COLL VENOUS BLD VENIPUNCTURE: CPT | Performed by: OBSTETRICS & GYNECOLOGY

## 2022-05-07 PROCEDURE — 250N000011 HC RX IP 250 OP 636: Performed by: NURSE ANESTHETIST, CERTIFIED REGISTERED

## 2022-05-07 PROCEDURE — 81003 URINALYSIS AUTO W/O SCOPE: CPT | Performed by: EMERGENCY MEDICINE

## 2022-05-07 PROCEDURE — 250N000013 HC RX MED GY IP 250 OP 250 PS 637: Performed by: STUDENT IN AN ORGANIZED HEALTH CARE EDUCATION/TRAINING PROGRAM

## 2022-05-07 PROCEDURE — 250N000013 HC RX MED GY IP 250 OP 250 PS 637: Performed by: EMERGENCY MEDICINE

## 2022-05-07 PROCEDURE — 86850 RBC ANTIBODY SCREEN: CPT | Performed by: STUDENT IN AN ORGANIZED HEALTH CARE EDUCATION/TRAINING PROGRAM

## 2022-05-07 PROCEDURE — C9803 HOPD COVID-19 SPEC COLLECT: HCPCS | Performed by: EMERGENCY MEDICINE

## 2022-05-07 PROCEDURE — 999N000141 HC STATISTIC PRE-PROCEDURE NURSING ASSESSMENT: Performed by: OBSTETRICS & GYNECOLOGY

## 2022-05-07 PROCEDURE — 999N000010 HC STATISTIC ANES STAT CODE-CRNA PER MINUTE: Performed by: OBSTETRICS & GYNECOLOGY

## 2022-05-07 PROCEDURE — 250N000011 HC RX IP 250 OP 636

## 2022-05-07 PROCEDURE — U0005 INFEC AGEN DETEC AMPLI PROBE: HCPCS | Performed by: OBSTETRICS & GYNECOLOGY

## 2022-05-07 PROCEDURE — 88309 TISSUE EXAM BY PATHOLOGIST: CPT | Mod: TC | Performed by: STUDENT IN AN ORGANIZED HEALTH CARE EDUCATION/TRAINING PROGRAM

## 2022-05-07 PROCEDURE — 250N000013 HC RX MED GY IP 250 OP 250 PS 637

## 2022-05-07 PROCEDURE — 258N000003 HC RX IP 258 OP 636: Performed by: NURSE ANESTHETIST, CERTIFIED REGISTERED

## 2022-05-07 PROCEDURE — 99285 EMERGENCY DEPT VISIT HI MDM: CPT | Performed by: EMERGENCY MEDICINE

## 2022-05-07 PROCEDURE — 99285 EMERGENCY DEPT VISIT HI MDM: CPT | Mod: 25 | Performed by: EMERGENCY MEDICINE

## 2022-05-07 PROCEDURE — 36415 COLL VENOUS BLD VENIPUNCTURE: CPT | Performed by: EMERGENCY MEDICINE

## 2022-05-07 PROCEDURE — 250N000013 HC RX MED GY IP 250 OP 250 PS 637: Performed by: ANESTHESIOLOGY

## 2022-05-07 PROCEDURE — 370N000017 HC ANESTHESIA TECHNICAL FEE, PER MIN: Performed by: OBSTETRICS & GYNECOLOGY

## 2022-05-07 PROCEDURE — 10D17ZZ EXTRACTION OF PRODUCTS OF CONCEPTION, RETAINED, VIA NATURAL OR ARTIFICIAL OPENING: ICD-10-PCS | Performed by: OBSTETRICS & GYNECOLOGY

## 2022-05-07 PROCEDURE — 250N000013 HC RX MED GY IP 250 OP 250 PS 637: Performed by: OBSTETRICS & GYNECOLOGY

## 2022-05-07 PROCEDURE — 85018 HEMOGLOBIN: CPT | Performed by: OBSTETRICS & GYNECOLOGY

## 2022-05-07 PROCEDURE — 87070 CULTURE OTHR SPECIMN AEROBIC: CPT | Performed by: STUDENT IN AN ORGANIZED HEALTH CARE EDUCATION/TRAINING PROGRAM

## 2022-05-07 PROCEDURE — 87086 URINE CULTURE/COLONY COUNT: CPT | Performed by: EMERGENCY MEDICINE

## 2022-05-07 PROCEDURE — 86780 TREPONEMA PALLIDUM: CPT | Performed by: STUDENT IN AN ORGANIZED HEALTH CARE EDUCATION/TRAINING PROGRAM

## 2022-05-07 PROCEDURE — 258N000003 HC RX IP 258 OP 636: Performed by: ANESTHESIOLOGY

## 2022-05-07 PROCEDURE — 710N000010 HC RECOVERY PHASE 1, LEVEL 2, PER MIN: Performed by: OBSTETRICS & GYNECOLOGY

## 2022-05-07 PROCEDURE — 85025 COMPLETE CBC W/AUTO DIFF WBC: CPT | Performed by: EMERGENCY MEDICINE

## 2022-05-07 PROCEDURE — 250N000011 HC RX IP 250 OP 636: Performed by: STUDENT IN AN ORGANIZED HEALTH CARE EDUCATION/TRAINING PROGRAM

## 2022-05-07 RX ORDER — NALOXONE HYDROCHLORIDE 0.4 MG/ML
0.2 INJECTION, SOLUTION INTRAMUSCULAR; INTRAVENOUS; SUBCUTANEOUS
Status: DISCONTINUED | OUTPATIENT
Start: 2022-05-07 | End: 2022-05-08 | Stop reason: HOSPADM

## 2022-05-07 RX ORDER — CARBOPROST TROMETHAMINE 250 UG/ML
250 INJECTION, SOLUTION INTRAMUSCULAR
Status: DISCONTINUED | OUTPATIENT
Start: 2022-05-07 | End: 2022-05-08 | Stop reason: HOSPADM

## 2022-05-07 RX ORDER — CEFAZOLIN SODIUM 2 G/100ML
2 INJECTION, SOLUTION INTRAVENOUS ONCE
Status: COMPLETED | OUTPATIENT
Start: 2022-05-07 | End: 2022-05-07

## 2022-05-07 RX ORDER — ONDANSETRON 4 MG/1
4 TABLET, ORALLY DISINTEGRATING ORAL EVERY 6 HOURS PRN
Status: DISCONTINUED | OUTPATIENT
Start: 2022-05-07 | End: 2022-05-07 | Stop reason: HOSPADM

## 2022-05-07 RX ORDER — CITRIC ACID/SODIUM CITRATE 334-500MG
SOLUTION, ORAL ORAL
Status: COMPLETED
Start: 2022-05-07 | End: 2022-05-07

## 2022-05-07 RX ORDER — ACETAMINOPHEN 325 MG/1
975 TABLET ORAL ONCE
Status: COMPLETED | OUTPATIENT
Start: 2022-05-07 | End: 2022-05-07

## 2022-05-07 RX ORDER — SODIUM CHLORIDE, SODIUM LACTATE, POTASSIUM CHLORIDE, CALCIUM CHLORIDE 600; 310; 30; 20 MG/100ML; MG/100ML; MG/100ML; MG/100ML
INJECTION, SOLUTION INTRAVENOUS CONTINUOUS PRN
Status: DISCONTINUED | OUTPATIENT
Start: 2022-05-07 | End: 2022-05-07

## 2022-05-07 RX ORDER — OXYTOCIN 10 [USP'U]/ML
10 INJECTION, SOLUTION INTRAMUSCULAR; INTRAVENOUS
Status: DISCONTINUED | OUTPATIENT
Start: 2022-05-07 | End: 2022-05-08 | Stop reason: HOSPADM

## 2022-05-07 RX ORDER — OXYTOCIN/0.9 % SODIUM CHLORIDE 30/500 ML
340 PLASTIC BAG, INJECTION (ML) INTRAVENOUS CONTINUOUS PRN
Status: COMPLETED | OUTPATIENT
Start: 2022-05-07 | End: 2022-05-07

## 2022-05-07 RX ORDER — DOCUSATE SODIUM 100 MG/1
100 CAPSULE, LIQUID FILLED ORAL DAILY
Status: DISCONTINUED | OUTPATIENT
Start: 2022-05-07 | End: 2022-05-08 | Stop reason: HOSPADM

## 2022-05-07 RX ORDER — NALOXONE HYDROCHLORIDE 0.4 MG/ML
0.4 INJECTION, SOLUTION INTRAMUSCULAR; INTRAVENOUS; SUBCUTANEOUS
Status: DISCONTINUED | OUTPATIENT
Start: 2022-05-07 | End: 2022-05-08 | Stop reason: HOSPADM

## 2022-05-07 RX ORDER — CEFAZOLIN SODIUM 1 G/3ML
INJECTION, POWDER, FOR SOLUTION INTRAMUSCULAR; INTRAVENOUS PRN
Status: DISCONTINUED | OUTPATIENT
Start: 2022-05-07 | End: 2022-05-07

## 2022-05-07 RX ORDER — CEFAZOLIN SODIUM 1 G/3ML
1 INJECTION, POWDER, FOR SOLUTION INTRAMUSCULAR; INTRAVENOUS
Status: DISCONTINUED | OUTPATIENT
Start: 2022-05-07 | End: 2022-05-08 | Stop reason: HOSPADM

## 2022-05-07 RX ORDER — MISOPROSTOL 200 UG/1
400 TABLET ORAL
Status: DISCONTINUED | OUTPATIENT
Start: 2022-05-07 | End: 2022-05-08 | Stop reason: HOSPADM

## 2022-05-07 RX ORDER — PROPOFOL 10 MG/ML
INJECTION, EMULSION INTRAVENOUS CONTINUOUS PRN
Status: DISCONTINUED | OUTPATIENT
Start: 2022-05-07 | End: 2022-05-07

## 2022-05-07 RX ORDER — MISOPROSTOL 200 UG/1
800 TABLET ORAL ONCE
Status: COMPLETED | OUTPATIENT
Start: 2022-05-07 | End: 2022-05-07

## 2022-05-07 RX ORDER — MISOPROSTOL 200 UG/1
TABLET ORAL
Status: DISCONTINUED
Start: 2022-05-07 | End: 2022-05-07 | Stop reason: HOSPADM

## 2022-05-07 RX ORDER — ONDANSETRON 2 MG/ML
INJECTION INTRAMUSCULAR; INTRAVENOUS PRN
Status: DISCONTINUED | OUTPATIENT
Start: 2022-05-07 | End: 2022-05-07

## 2022-05-07 RX ORDER — FENTANYL CITRATE 50 UG/ML
50 INJECTION, SOLUTION INTRAMUSCULAR; INTRAVENOUS
Status: DISCONTINUED | OUTPATIENT
Start: 2022-05-07 | End: 2022-05-08 | Stop reason: HOSPADM

## 2022-05-07 RX ORDER — SODIUM CHLORIDE, SODIUM LACTATE, POTASSIUM CHLORIDE, CALCIUM CHLORIDE 600; 310; 30; 20 MG/100ML; MG/100ML; MG/100ML; MG/100ML
INJECTION, SOLUTION INTRAVENOUS
Status: DISCONTINUED
Start: 2022-05-07 | End: 2022-05-07 | Stop reason: HOSPADM

## 2022-05-07 RX ORDER — ACETAMINOPHEN 500 MG
1000 TABLET ORAL ONCE
Status: COMPLETED | OUTPATIENT
Start: 2022-05-07 | End: 2022-05-07

## 2022-05-07 RX ORDER — FENTANYL CITRATE 50 UG/ML
50 INJECTION, SOLUTION INTRAMUSCULAR; INTRAVENOUS ONCE
Status: COMPLETED | OUTPATIENT
Start: 2022-05-07 | End: 2022-05-07

## 2022-05-07 RX ORDER — METHYLERGONOVINE MALEATE 0.2 MG/ML
200 INJECTION INTRAVENOUS
Status: DISCONTINUED | OUTPATIENT
Start: 2022-05-07 | End: 2022-05-08 | Stop reason: HOSPADM

## 2022-05-07 RX ORDER — BISACODYL 10 MG
10 SUPPOSITORY, RECTAL RECTAL DAILY PRN
Status: DISCONTINUED | OUTPATIENT
Start: 2022-05-07 | End: 2022-05-08 | Stop reason: HOSPADM

## 2022-05-07 RX ORDER — SODIUM CHLORIDE, SODIUM LACTATE, POTASSIUM CHLORIDE, CALCIUM CHLORIDE 600; 310; 30; 20 MG/100ML; MG/100ML; MG/100ML; MG/100ML
INJECTION, SOLUTION INTRAVENOUS CONTINUOUS
Status: DISCONTINUED | OUTPATIENT
Start: 2022-05-07 | End: 2022-05-08 | Stop reason: HOSPADM

## 2022-05-07 RX ORDER — ACETAMINOPHEN 325 MG/1
650 TABLET ORAL EVERY 4 HOURS PRN
Status: DISCONTINUED | OUTPATIENT
Start: 2022-05-07 | End: 2022-05-07 | Stop reason: HOSPADM

## 2022-05-07 RX ORDER — MODIFIED LANOLIN
OINTMENT (GRAM) TOPICAL
Status: DISCONTINUED | OUTPATIENT
Start: 2022-05-07 | End: 2022-05-08 | Stop reason: HOSPADM

## 2022-05-07 RX ORDER — CITRIC ACID/SODIUM CITRATE 334-500MG
30 SOLUTION, ORAL ORAL ONCE
Status: COMPLETED | OUTPATIENT
Start: 2022-05-07 | End: 2022-05-07

## 2022-05-07 RX ORDER — PROPOFOL 10 MG/ML
INJECTION, EMULSION INTRAVENOUS PRN
Status: DISCONTINUED | OUTPATIENT
Start: 2022-05-07 | End: 2022-05-07

## 2022-05-07 RX ORDER — ACETAMINOPHEN 325 MG/1
650 TABLET ORAL EVERY 4 HOURS PRN
Status: DISCONTINUED | OUTPATIENT
Start: 2022-05-07 | End: 2022-05-08 | Stop reason: HOSPADM

## 2022-05-07 RX ORDER — HYDROXYZINE HYDROCHLORIDE 50 MG/1
100 TABLET, FILM COATED ORAL
Status: DISCONTINUED | OUTPATIENT
Start: 2022-05-07 | End: 2022-05-08 | Stop reason: HOSPADM

## 2022-05-07 RX ORDER — IBUPROFEN 800 MG/1
800 TABLET, FILM COATED ORAL EVERY 6 HOURS PRN
Status: DISCONTINUED | OUTPATIENT
Start: 2022-05-07 | End: 2022-05-08 | Stop reason: HOSPADM

## 2022-05-07 RX ORDER — MISOPROSTOL 200 UG/1
800 TABLET ORAL
Status: DISCONTINUED | OUTPATIENT
Start: 2022-05-07 | End: 2022-05-08 | Stop reason: HOSPADM

## 2022-05-07 RX ORDER — HYDROCORTISONE 2.5 %
CREAM (GRAM) TOPICAL 3 TIMES DAILY PRN
Status: DISCONTINUED | OUTPATIENT
Start: 2022-05-07 | End: 2022-05-08 | Stop reason: HOSPADM

## 2022-05-07 RX ORDER — METHYLERGONOVINE MALEATE 0.2 MG/1
200 TABLET ORAL 3 TIMES DAILY
Status: DISCONTINUED | OUTPATIENT
Start: 2022-05-07 | End: 2022-05-08 | Stop reason: HOSPADM

## 2022-05-07 RX ORDER — TRANEXAMIC ACID 10 MG/ML
1 INJECTION, SOLUTION INTRAVENOUS EVERY 30 MIN PRN
Status: DISCONTINUED | OUTPATIENT
Start: 2022-05-07 | End: 2022-05-08 | Stop reason: HOSPADM

## 2022-05-07 RX ORDER — ONDANSETRON 2 MG/ML
4 INJECTION INTRAMUSCULAR; INTRAVENOUS EVERY 30 MIN PRN
Status: DISCONTINUED | OUTPATIENT
Start: 2022-05-07 | End: 2022-05-08 | Stop reason: HOSPADM

## 2022-05-07 RX ORDER — ONDANSETRON 4 MG/1
4 TABLET, ORALLY DISINTEGRATING ORAL EVERY 30 MIN PRN
Status: DISCONTINUED | OUTPATIENT
Start: 2022-05-07 | End: 2022-05-08 | Stop reason: HOSPADM

## 2022-05-07 RX ORDER — HYDROXYZINE HYDROCHLORIDE 25 MG/1
25-50 TABLET, FILM COATED ORAL EVERY 6 HOURS PRN
Status: DISCONTINUED | OUTPATIENT
Start: 2022-05-07 | End: 2022-05-08 | Stop reason: HOSPADM

## 2022-05-07 RX ORDER — FENTANYL CITRATE 50 UG/ML
INJECTION, SOLUTION INTRAMUSCULAR; INTRAVENOUS PRN
Status: DISCONTINUED | OUTPATIENT
Start: 2022-05-07 | End: 2022-05-07

## 2022-05-07 RX ORDER — FENTANYL CITRATE 50 UG/ML
25 INJECTION, SOLUTION INTRAMUSCULAR; INTRAVENOUS EVERY 5 MIN PRN
Status: DISCONTINUED | OUTPATIENT
Start: 2022-05-07 | End: 2022-05-08 | Stop reason: HOSPADM

## 2022-05-07 RX ORDER — DIMENHYDRINATE 50 MG/ML
25 INJECTION, SOLUTION INTRAMUSCULAR; INTRAVENOUS
Status: DISCONTINUED | OUTPATIENT
Start: 2022-05-07 | End: 2022-05-08 | Stop reason: HOSPADM

## 2022-05-07 RX ADMIN — ACETAMINOPHEN 1000 MG: 500 TABLET ORAL at 12:09

## 2022-05-07 RX ADMIN — PROPOFOL 125 MCG/KG/MIN: 10 INJECTION, EMULSION INTRAVENOUS at 18:06

## 2022-05-07 RX ADMIN — CEFAZOLIN 3 G: 1 INJECTION, POWDER, FOR SOLUTION INTRAMUSCULAR; INTRAVENOUS at 18:07

## 2022-05-07 RX ADMIN — METHYLERGONOVINE 200 MCG: 0.2 TABLET ORAL at 21:39

## 2022-05-07 RX ADMIN — PROPOFOL 50 MG: 10 INJECTION, EMULSION INTRAVENOUS at 18:20

## 2022-05-07 RX ADMIN — CEFAZOLIN SODIUM 2 G: 2 INJECTION, SOLUTION INTRAVENOUS at 17:45

## 2022-05-07 RX ADMIN — FENTANYL CITRATE 50 MCG: 50 INJECTION, SOLUTION INTRAMUSCULAR; INTRAVENOUS at 17:25

## 2022-05-07 RX ADMIN — ONDANSETRON 4 MG: 2 INJECTION INTRAMUSCULAR; INTRAVENOUS at 18:30

## 2022-05-07 RX ADMIN — MISOPROSTOL 800 MCG: 200 TABLET ORAL at 13:45

## 2022-05-07 RX ADMIN — METHYLERGONOVINE MALEATE 200 MCG: 0.2 INJECTION INTRAVENOUS at 15:09

## 2022-05-07 RX ADMIN — FENTANYL CITRATE 50 MCG: 50 INJECTION, SOLUTION INTRAMUSCULAR; INTRAVENOUS at 18:18

## 2022-05-07 RX ADMIN — IBUPROFEN 800 MG: 800 TABLET, FILM COATED ORAL at 20:30

## 2022-05-07 RX ADMIN — Medication 340 ML/HR: at 15:05

## 2022-05-07 RX ADMIN — SODIUM CHLORIDE, POTASSIUM CHLORIDE, SODIUM LACTATE AND CALCIUM CHLORIDE: 600; 310; 30; 20 INJECTION, SOLUTION INTRAVENOUS at 18:06

## 2022-05-07 RX ADMIN — SODIUM CITRATE AND CITRIC ACID MONOHYDRATE 30 ML: 500; 334 SOLUTION ORAL at 16:56

## 2022-05-07 RX ADMIN — FENTANYL CITRATE 50 MCG: 50 INJECTION, SOLUTION INTRAMUSCULAR; INTRAVENOUS at 16:26

## 2022-05-07 RX ADMIN — SODIUM CHLORIDE, POTASSIUM CHLORIDE, SODIUM LACTATE AND CALCIUM CHLORIDE: 600; 310; 30; 20 INJECTION, SOLUTION INTRAVENOUS at 19:30

## 2022-05-07 RX ADMIN — MIDAZOLAM 2 MG: 1 INJECTION INTRAMUSCULAR; INTRAVENOUS at 18:05

## 2022-05-07 RX ADMIN — FENTANYL CITRATE 50 MCG: 50 INJECTION, SOLUTION INTRAMUSCULAR; INTRAVENOUS at 15:24

## 2022-05-07 RX ADMIN — FENTANYL CITRATE 50 MCG: 50 INJECTION INTRAMUSCULAR; INTRAVENOUS at 13:57

## 2022-05-07 RX ADMIN — ACETAMINOPHEN 975 MG: 325 TABLET ORAL at 21:39

## 2022-05-07 RX ADMIN — FENTANYL CITRATE 50 MCG: 50 INJECTION, SOLUTION INTRAMUSCULAR; INTRAVENOUS at 18:10

## 2022-05-07 RX ADMIN — Medication 30 ML: at 16:56

## 2022-05-07 NOTE — OP NOTE
[facility]  OPERATIVE NOTE: DILATION AND CURETTAGE     DATE: 2022  PATIENT: Minad Silverman    SURGEON: Vika Hopper MD  ASSISTANT(S): Claudia Samano DO, MS PGY-1    PRE-OPERATIVE DIAGNOSIS:   Previable  delivery of fetus  Bleeding, concerned for retained products    POST-OPERATIVE DIAGNOSIS:   Same, s/p procedure    PROCEDURE: EUA, Dilation and Curettage under ultrasound guidance  ANESTHESIA: MAC, cervical block    EBL: 25 mL   IVF: 500 mL LR   UOP: 250 cc clear yellow urine drained at end of case.    COMPLICATIONS: None apparent   SPECIMEN(S):   ID Type Source Tests Collected by Time Destination   A :  Placenta Placenta OR DOCUMENTATION ONLY Vika Hopper MD 2022  6:32 PM      INDICATIONS: Minda Silverman is a 26 year old  at 18w6d by 6w6d US who initially presented earlier today to ED for evaluation of abdominal pain. While in ED patint unfortunately experienced a previable fetal delivery, QBL at time of delivery approximately 501ml with 800mg VA misoprostol placed. Once transferred to the floor patient continued to have ongoing vaginal bleeding initially managed with pitocin, and methergine with an additional 419ml of blood loss, manual sweep was attempted. Given amount of blood clot palpated on initial uterine sweep, and patient discomfort, decision was made to move to OR for complete evacuation of uterus with suction D&C at patient request. The patient was counseled on risks, benefits and alternatives to the above listed procedure. All questions were answered and consent was obtained.     FINDINGS:   EUA revealed normal appearing external genitalia. Small amount of blood in vaginal canal.  Multiparous cervix with dilated external os     PROCEDURE:   The patient was taken to the operating room and sedation was administered.  Patient was given 3g Ancef for antimicrobial prophylaxis. She was then placed in  the dorsal lithotomy position. An EUA was performed with the above listed findings.  The patient was then prepped and draped in the usual sterile fashion and a safety timeout performed.    An open-sided speculum was placed into the vagina and the anterior lip of the cervix grasped with a ring forceps. Cervical anesthesia was injected using a total of 5 cc of 2% lidocaine plain.  A 11 mm firm, curved suction cannula was inserted into the uterus under ultrasound guidance and suction aspiration was performed. Sharp curettage was use to gently evacuate remaining product from the uterus. Final pass of suction curettage until a gritty texture noted throughout the cavity.    Ring forceps was removed from anterior lip of cervix. The cervix and vaginal vault were inspected. Good hemostasis was noted. The instruments were removed from the vagina.  Sponge and needle counts were correct at the close of the case x 2.  After anesthesia reversal, the patient was transferred to the recovery room in stable condition.      Dr. Hopper was present for the entire case.     Claudia Samano DO, MS  Obstetrics, Gynecology & Women's Health   Resident, PGY-1  05/07/2022 6:47 PM     I was present and scrubbed for entire procedure.   Vika Hopper MD

## 2022-05-07 NOTE — DISCHARGE SUMMARY
[facility]   Gynecology Discharge Summary    Patient: Minda Silverman    YOB: 1995   MRN# [id number]           Date of Admission:  2022  Date of Discharge::  2022   Admitting Physician:  Vika Hopper MD  Discharge Physician:  Joan Cole MD             Admission Diagnoses:   - Spontaneous Previable  Delivery          Discharge Diagnosis:   - Same  - Bleeding concern for retained products  - Acute blood loss anemia              Procedures Performed:     Procedure(s): EAU, Suction Dilation and curettage under ultrasound guidance           Admission History   Minda Silverman is a 26 year old  @18w6d admitted with previable delivery in the ED. We arrived following delivery of fetus. In brief, initially patient allowed privacy following delivery. When evaluated at 15 min noted to have bleeding. Placenta attempted to be delivered, miso given rectally. Placenta then delivered intact.          Operative Course   While in ED patint unfortunately experienced a previable fetal delivery, QBL at time of delivery approximately 501ml with 800mg VT misoprostol placed. Once transferred to the floor patient continued to have ongoing vaginal bleeding initially managed with pitocin, and methergine with an additional 419ml of blood loss, manual sweep was attempted. Given amount of blood clot palpated on initial uterine sweep, and patient discomfort, decision was made to move to OR for complete evacuation of uterus with suction D&C at patient request.           Operative Findings   EUA revealed normal appearing external genitalia. Small amount of blood in vaginal canal.  Multiparous cervix with dilated external os           Hospital Course   Her postoperative course was uncomplicated.On POD#1, patient was doing well. sShe was tolerating regular diet, ambulating without difficulty, voiding spontaneously, and pain was well  controlled. Scant bleeding overnight. She was requesting discharge home and deemed stable. Discharging patient with 1 day of 200mcg Methergine TID.     Hemoglobin at the time of discharge was: 9.6. Asymptomatic from acute blood anemia. Will plan to send home with PO iron.    Hemoglobin   Date Value Ref Range Status   05/08/2022 9.6 (L) 11.7 - 15.7 g/dL Final   06/07/2021 12.1 11.7 - 15.7 g/dL Final            Discharge Instructions and Follow-Up:     Discharge activity: - Return to regular activity as tolerated   Discharge follow-up: Follow up with primary OB in 4 weeks for check in and preconception counseling.  Nothing in vagina for 2 weeks   Return Precautions: Patient was instructed to call  or return to ED for any of the following:    - Temperature greater than 100.4F   - Pain not controlled by pain medications   - Uncontrolled nausea/vomiting   - Foul-smelling vaginal discharge   - Vaginal bleeding soaking 1 pad per hour for 2 hours in a row   Discharge Medications:    Review of your medicines      START taking      Dose / Directions   ferrous sulfate 325 (65 Fe) MG EC tablet  Commonly known as: FE TABS  Used for: Anemia due to blood loss, acute      Dose: 325 mg  Take 1 tablet (325 mg) by mouth daily  Quantity: 90 tablet  Refills: 1     methylergonovine 0.2 MG tablet  Commonly known as: METHERGINE  Used for: Uterine atony      Dose: 200 mcg  Take 1 tablet (200 mcg) by mouth 3 times daily  Quantity: 3 tablet  Refills: 0        CONTINUE these medicines which have NOT CHANGED      Dose / Directions   docusate sodium 100 MG capsule  Commonly known as: COLACE      Take 1 capsule by mouth twice a day as needed for constipation  Refills: 0     metoclopramide 10 MG tablet  Commonly known as: REGLAN      Dose: 10 mg  Take 1 tablet (10 mg) by mouth 3 times daily as needed (headache and nausea vomiting)  Quantity: 12 tablet  Refills: 0     Prenatal Vitamins 28-0.8 MG Tabs      Dose: 1 tablet  Take 1 tablet by mouth  daily  Refills: 0     pyridOXINE 25 MG tablet  Commonly known as: VITAMIN B6      TAKE ONE TABLET BY MOUTH THREE TIMES DAILY AS NEEDED FOR NAUSEA  Refills: 0           Where to get your medicines      These medications were sent to [id number] - [place], [place] - [address]  [address] [address]    Phone: [phone]     methylergonovine 0.2 MG tablet     These medications were sent to [facility] [place], [place] - [address]  [address] [place] [address] [address]    Phone: [phone]     ferrous sulfate 325 (65 Fe) MG EC tablet                Discharge Disposition:     Discharged to home        Claudia Samano DO, MS   Obstetrics, Gynecology & Women's Health   Resident, PGY-1  05/08/2022 1:37 PM

## 2022-05-07 NOTE — ANESTHESIA PREPROCEDURE EVALUATION
Anesthesia Pre-Procedure Evaluation    Patient: Minda Silverman   MRN: [id number] : 1995        Procedure : Procedure(s):  DILATION AND CURETTAGE, UTERUS, USING SUCTION          Past Medical History:   Diagnosis Date     NO ACTIVE PROBLEMS       No past surgical history on file.   No Known Allergies   Social History     Tobacco Use     Smoking status: Current Every Day Smoker     Years: 0.20     Smokeless tobacco: Never Used   Substance Use Topics     Alcohol use: No      Wt Readings from Last 1 Encounters:   22 149.4 kg (329 lb 6.4 oz)        Anesthesia Evaluation   Pt has had prior anesthetic. Type: Regional and MAC.        ROS/MED HX  ENT/Pulmonary:     (+) MARIA G risk factors, snores loudly, obese,     Neurologic:  - neg neurologic ROS     Cardiovascular:  - neg cardiovascular ROS     METS/Exercise Tolerance:     Hematologic:       Musculoskeletal:       GI/Hepatic:     (+) GERD, Other,     Renal/Genitourinary:       Endo:     (+) Obesity,     Psychiatric/Substance Use:  - neg psychiatric ROS     Infectious Disease:       Malignancy:       Other:            Physical Exam    Airway        Mallampati: II   TM distance: > 3 FB   Neck ROM: full   Mouth opening: > 3 cm    Respiratory Devices and Support         Dental  no notable dental history         Cardiovascular          Rhythm and rate: regular and normal     Pulmonary           breath sounds clear to auscultation       Other findings: ebl ~800    OUTSIDE LABS:  CBC:   Lab Results   Component Value Date    WBC 11.1 (H) 2022    WBC 8.3 03/10/2022    HGB 12.0 2022    HGB 12.5 03/10/2022    HCT 36.0 2022    HCT 37.9 03/10/2022     2022     03/10/2022     BMP:   Lab Results   Component Value Date     10/04/2021     2021    POTASSIUM 3.7 10/04/2021    POTASSIUM 3.8 2021    CHLORIDE 105 10/04/2021    CHLORIDE 105 2021    CO2 26 10/04/2021    CO2 28 2021    BUN 8 10/04/2021     BUN 8 06/07/2021    CR 0.63 10/04/2021    CR 0.81 06/07/2021    GLC 98 10/04/2021    GLC 88 06/07/2021     COAGS: No results found for: PTT, INR, FIBR  POC:   Lab Results   Component Value Date    HCG Negative 06/07/2021    HCGS Negative 10/04/2021     HEPATIC:   Lab Results   Component Value Date    ALBUMIN 3.3 (L) 10/04/2021    PROTTOTAL 8.7 10/04/2021    ALT 53 (H) 10/04/2021    AST 26 10/04/2021    ALKPHOS 86 10/04/2021    BILITOTAL 0.6 10/04/2021     OTHER:   Lab Results   Component Value Date    LACT 1.2 06/07/2021    MAURILIO 9.0 10/04/2021    LIPASE 80 10/04/2021       Anesthesia Plan    ASA Status:  3, emergent    NPO Status:  ELEVATED Aspiration Risk/Unknown    Anesthesia Type: MAC.     - Reason for MAC: straight local not clinically adequate              Consents    Anesthesia Plan(s) and associated risks, benefits, and realistic alternatives discussed. Questions answered and patient/representative(s) expressed understanding.    - Discussed:     - Discussed with:  Patient      - Extended Intubation/Ventilatory Support Discussed: No.      - Patient is DNR/DNI Status: No         Postoperative Care            Comments:                Mahnaz Mccracken MD

## 2022-05-07 NOTE — PROGRESS NOTES
"Brief Interval Progress Note:    Was called to evaluate patient for bleeding following earlier delivery.      S:  Patient currently sitting on the toilet, having some mild abdominal cramping. Otherwise feeling okay. Denies lightheadedness or dizziness. Denies history of HTN or Asthma.    O:  /55   Pulse 65   Temp 98.2  F (36.8  C) (Oral)   Resp 18   Ht 1.6 m (5' 3\")   Wt 149.4 kg (329 lb 6.4 oz)   LMP 12/10/2021 (Approximate)   SpO2 94%   BMI 58.35 kg/m     Gen: Patient sitting on toilet. Blood throughout bathroom. Large clot in bathtub.   Abd: soft. Appropriately tender throughout, no focal tenderness of uterus. Fundus of uterus approximately 2cm below the umbilicus.       A/P:   Patient with ongoing vaginal bleeding since delivery. On arrival to room pitocin had just been started. Patient was administered 800mcg of rectal miso following delivery. Uterine fundus appx 2 below umbilicus. Given dose of methergine for ongoing management of bleeding. Will plan to reassess in 20-30 minutes. Nursing staff to do fundal checks q5-10 minutes. Discussed with patient that next steps of management should bleeding persist would be additional medication administration, possible manual sweep, possible D&C. All questions were answered.    Discussed the patient with Dr. Hopper who agrees with the above assessment/plan.     Claudia Samano DO, MS  Obstetrics, Gynecology & Women's Health   Resident, PGY-1  05/07/2022 3:15 PM    "

## 2022-05-07 NOTE — ANESTHESIA POSTPROCEDURE EVALUATION
Patient: Minda Silverman    Procedure: Procedure(s):  DILATION AND CURETTAGE, UTERUS, USING SUCTION       Anesthesia Type:  MAC    Note:  Disposition: Admission   Postop Pain Control: Uneventful            Sign Out: Well controlled pain   PONV: No   Neuro/Psych: Uneventful            Sign Out: Acceptable/Baseline neuro status   Airway/Respiratory: Uneventful            Sign Out: Acceptable/Baseline resp. status   CV/Hemodynamics: Uneventful            Sign Out: Acceptable CV status; No obvious hypovolemia; No obvious fluid overload   Other NRE: NONE   DID A NON-ROUTINE EVENT OCCUR? No           Last vitals:  Vitals Value Taken Time   BP     Temp     Pulse     Resp     SpO2         Electronically Signed By: Mahnaz Mccracken MD  May 7, 2022  6:53 PM

## 2022-05-07 NOTE — ED TRIAGE NOTES
Triage Assessment     Row Name 05/07/22 1118       Triage Assessment (Adult)    Airway WDL WDL       Respiratory WDL    Respiratory WDL WDL       Skin Circulation/Temperature WDL    Skin Circulation/Temperature WDL WDL       Cardiac WDL    Cardiac WDL WDL       Peripheral/Neurovascular WDL    Peripheral Neurovascular WDL WDL       Cognitive/Neuro/Behavioral WDL    Cognitive/Neuro/Behavioral WDL WDL

## 2022-05-07 NOTE — PLAN OF CARE
Called to ER to help with delivery. Upon arrival fetus upon chest of patient. Demised upon delivery- see APGARS. Per ER RN fetus delivered at 1330. Placenta at 1346. 800 mcg of rectal misoprostol given by Dr. Hopper at 1345. . Patient stable. Fundus firm and bleeding minimal. Patient transferred to [facility]. Debrief completed with Dr Hopper plan to send placenta for cultures. SW paged. Report to Crow ORTEGA at bedside.

## 2022-05-07 NOTE — PROGRESS NOTES
[facility]   Brief OBGYN Consult Note      Summary:   Received call from L&D charge nurse into workroom to urgently present to the Emergency department for a delivery. Upon arrival, ED physician and staff delivering a reported 18w gestation fetus. Assisted with complete delivery of fetus and cutting of umbilical cord. Minimal bleeding coming from uterus. Given patient vitals and uterine bleeding were stable, requested ED nursing staff to give patient, patients mother, and patients partner who presented shortly after our arrival some time alone with the passed infant.      A&P:   Minda Silverman is a 26 year old  at 18w6d who initially presented to the ED approximately 1115 this morning for evaluation of abdominal pain. Now s/p spontaneous previable  delivery.Per medical records patient has a history of SAB (14w0d, ), and  birth (29w3d, ). Patient was seen earlier in this pregnancy by MFM for evaluation and discussion of management of this pregnancy given past history. Plan will be to transport patient to L&D for monitoring, administer misoprostol to allow for spontaneous passage of placenta. Will reassess patient and obtain additional history at a later time to allow family time with one another.     Dr. Hopper and OB G3 (Dr. Alexandre) present.    Claudia Samano DO, MS  Obstetrics, Gynecology & Women's Health   Resident, PGY-1  2022 1:36 PM    Appreciate note by Dr. Samano. Patient has been seen and examined by me separate from the resident, agree with above note.     Vika Hopper MD  4:55 PM

## 2022-05-07 NOTE — ED TRIAGE NOTES
Patient states that she woke up this morning and her stomach is hurting really bad. She thought it would go away but it is getting stronger. She states that it is intermittent, goes away and comes back. She points to the entire area of her lower abdomen. Patient states that she is 18 weeks pregnant, and denies any vaginal bleeding. She states that it is her second pregnancy. She states that she has not had a bowel movement for two days.

## 2022-05-07 NOTE — H&P
H&P    HPI: 26 year old  @18w6d admitted with previable delivery in the ED. We arrived following delivery of fetus. In brief, initially patient allowed privacy following delivery. When evaluated at 15 min noted to have bleeding. Placenta attempted to be delivered, miso given rectally. Placenta then delivered intact.     OBHx: PPROM @27wks followed by  at 29wks. Following in [facility] for CL US, declined 17-OHP.   Past Medical History:   Diagnosis Date     NO ACTIVE PROBLEMS    PSHx: D&C for 14wk IUFD    FamHx: Negative  SHx: Here with partner Dm  Meds: PNV  All: NKDA  ROS: otherwise negative    O:   Vitals:    22 1500 22 1530 22 1537   BP: 108/55  120/63   Pulse:      Resp:      Temp:  98.6  F (37  C)    SpO2:        General: Tearful  Heart: RRR  Lungs: CTAB  Abdomen: Obese, non-tender  Extremities: Non-tender, no edema    SVE: Clot removed from uterus, palpable clot still present. Patient with discomfort during exam.     CBC RESULTS: Recent Labs   Lab Test 22  1203   WBC 11.1*   RBC 3.96   HGB 12.0   HCT 36.0   MCV 91   MCH 30.3   MCHC 33.3   RDW 14.6        O+    A/P: 26 year old  s/p previable delivery @18w6d    PPH: 500cc blood loss with delivery, prior to placental delivery. Now with ongoing bleeding and concern for retained products. Recommend D&C. Risks and benefits reviewed, anesthesia updated.   SW consult   Placental cultures, pathology    Vika Hopper MD

## 2022-05-07 NOTE — CONSULTS
"[facility]  MATERNAL CHILD HEALTH    LOSS PSYCHOSOCIAL ASSESSMENT     DATA:     Presenting Information: Minda is a 26 year old  who presented to the ED in  labor and quickly delivered her baby boy, Dm, at 18w6d. She was transferred to L&D for care with baby.     Family Constellation: Minda lives with her 5 year old daughter and her brother in [place]. Phil lives in [place] and has 1 year old twins and a 7 year old.     Social Support: Minda's mom and sister and Dm's sister and family.     Education/Employment: Dad works as a  in [place] and mom is a .     Mental/Chemical Health History: Unable to assess    INTERVENTION:       Chart review    Collaboration with team: JORDAN Ngo    Introduction to Maternal Child Health SW role and scope of practice    Provided supportive listening and validation of emotions.    Provided parents with options for disposition of baby's remains.    Assessed for additional family needs or concerns.    Provided psychoeducation on  mood and anxiety disorders    Provided SW business card.     Provided grief resources, including     Collette Tisdahl option of applying for financial aid for therapy     [facility] online support groupsp    PPSM postcard    Info about [employer]; SW and family filled out online application together for financial assistance with cremation    ASSESSMENT:     Parents are appropriately grieving their baby boy. Dad, Dm, is \"torn up\" and mom is \"devastated.\" They were both matter of fact and wanted to plan disposition right away but later were both very tearful after spending time together with baby, named Dm after dad. They were both able to hold and admire him. They are interested in hand and foot prints and molds.     Mom is having lots of bleeding and needs to go to the OR. She is tearful and frightened, but wanted to focus on the to do list and things we " could accomplish. Family is interested in cremation and making jewelry out of the remains. Family is worried about financial cost. SW explained [employer] [facility] that helps with cost of cremation. Family and SW filled out online application. Family should receive an email within the next day. Then the  home will call them to set up a time to discuss arrangements.     Disposition: Private cremation with     [facility]  [facility] [place] [address] [place] [address]  Tel: [phone]  Fax: [phone]    [employer] [facility] will help arrange and pay for costs.     Family will receive an email from Casie within the next day for next steps. SW will follow-up on Monday with  home and [employer] to check in on progress.     PLAN:     SW will check in with  home, [employer] and family early next week.     SW will continue to follow.     Lolis Ayoub, REHANA, SW  Maternal and Child Health   Office: [phone]  Pager: [phone]  After Hours Pager: [phone]  [email or web address]

## 2022-05-07 NOTE — ANESTHESIA CARE TRANSFER NOTE
Patient: Minda Silverman    Procedure: Procedure(s):  DILATION AND CURETTAGE, UTERUS, USING SUCTION       Diagnosis: 18 weeks gestation of pregnancy [Z3A.18]  Diagnosis Additional Information: No value filed.    Anesthesia Type:   MAC     Note:    Oropharynx: spontaneously breathing  Level of Consciousness: awake  Oxygen Supplementation: room air    Independent Airway: airway patency satisfactory and stable  Dentition: dentition unchanged  Vital Signs Stable: post-procedure vital signs reviewed and stable  Report to RN Given: handoff report given  Patient transferred to: Labor and Delivery    Handoff Report: Identifed the Patient, Identified the Reponsible Provider, Reviewed the pertinent medical history, Discussed the surgical course, Reviewed Intra-OP anesthesia mangement and issues during anesthesia, Set expectations for post-procedure period and Allowed opportunity for questions and acknowledgement of understanding      Vitals:  Vitals Value Taken Time   BP     Temp     Pulse     Resp     SpO2         Electronically Signed By: OSCAR Millard CRNA  May 7, 2022  6:51 PM

## 2022-05-07 NOTE — ED NOTES
"Pt using bathroom. Pt mom came out of room yelling \"baby coming.\" This writer, Ladi RN, Carmen RN, Dr. Watson went into room. Fetus feet coming through vaginal canal. Precip kit at bedside. OB called STAT to bedside. Pt transitioned back to bed and OB assisted with delivery of non viable fetus. Pt requesting time with fetus and family and wishes respected. Pt will then transfer to L&D for further care and delivery of placenta.   "

## 2022-05-07 NOTE — PROVIDER NOTIFICATION
G1 called to bedside to assess bleeding, writer to bedside to assist Crow RN, pitocin started IV, methergine given IM, see  updated QBL volume 743, patient's vital stable, pulse oximeter applied, patient endorsing pain with fundal checks, small trickle noted- Dr Hopper updated, fentanyl ordered, report to resource RN Ladi at bedside.

## 2022-05-07 NOTE — ED PROVIDER NOTES
ED Provider Note  [facility]      History     Chief Complaint   Patient presents with     Abdominal Pain     Patient states to have abdominal pain that started this morning, intermittent. She states that she is 18 weeks pregnant.      HPI  Minda Silverman is a 26 year old female who presents with abdominal pain.  Minda is a  @ 18 weeks 6 days who presents with abdominal pain that started this am.  She states she had some mild spotting upon arrival to the ED.  The pain is pelvic and episodic.  She has not noted any discharge.  The pain is worse on her left side.  She has no urinary symptoms.  She has not had a bowel movement in 2 days.  She is followed by MFM.           Past Medical History  Past Medical History:   Diagnosis Date     NO ACTIVE PROBLEMS      Past Surgical History:   Procedure Laterality Date     DILATION AND CURETTAGE SUCTION N/A 2022    Procedure: DILATION AND CURETTAGE, UTERUS, USING SUCTION;  Surgeon: Vika Hopper MD;  Location: [facility] L+D     docusate sodium (COLACE) 100 MG capsule  ferrous sulfate (FE TABS) 325 (65 Fe) MG EC tablet  methylergonovine (METHERGINE) 0.2 MG tablet  Prenatal Vit-Fe Fumarate-FA (PRENATAL VITAMINS) 28-0.8 MG TABS  metoclopramide (REGLAN) 10 MG tablet  VITAMIN  B-6 25 MG tablet      No Known Allergies  Family History  No family history on file.  Social History   Social History     Tobacco Use     Smoking status: Current Every Day Smoker     Years: 0.20     Smokeless tobacco: Never Used   Substance Use Topics     Alcohol use: No     Drug use: Never      Past medical history, past surgical history, medications, allergies, family history, and social history were reviewed with the patient. No additional pertinent items.       Review of Systems  A complete review of systems was performed with pertinent positives and negatives noted in the HPI, and all other systems negative.    Physical Exam   BP: 131/79  Pulse: 65  Temp: 98.2  " F (36.8  C)  Resp: 18  Height: 160 cm (5' 3\")  Weight: 149.4 kg (329 lb 6.4 oz)  SpO2: 94 %  Physical Exam  Vitals and nursing note reviewed.   Constitutional:       Appearance: She is not toxic-appearing or diaphoretic.   HENT:      Head: Normocephalic and atraumatic.      Mouth/Throat:      Mouth: Mucous membranes are moist.   Eyes:      Conjunctiva/sclera: Conjunctivae normal.   Cardiovascular:      Rate and Rhythm: Normal rate and regular rhythm.   Pulmonary:      Effort: Pulmonary effort is normal. No respiratory distress.      Breath sounds: No wheezing or rales.   Abdominal:      Palpations: Abdomen is soft.      Tenderness: There is no abdominal tenderness. There is no guarding or rebound.   Musculoskeletal:         General: Normal range of motion.      Cervical back: Normal range of motion and neck supple. No rigidity.   Skin:     General: Skin is warm and dry.   Neurological:      Mental Status: She is alert and oriented to person, place, and time.   Psychiatric:         Behavior: Behavior normal.         Thought Content: Thought content normal.         ED Course      Procedures       The medical record was reviewed and interpreted.              No results found for any visits on 05/07/22.  Medications   acetaminophen (TYLENOL) tablet 1,000 mg (has no administration in time range)        Assessments & Plan (with Medical Decision Making)   Minda is 18+6 weeks pregnant and presents with lower pelvic pain and spotting beginning this morning.  On exam, her vital signs are normal, she is not toxic appearing. Plan was to perform a pelvic exam, US, urinalysis and labs.  During her evaluation, the patient was in the bathroom and gave birth to a fetus that was not living. OB was notified and admitted the patient for delivery of the placenta and further after cares.      I have reviewed the nursing notes. I have reviewed the findings, diagnosis, plan and need for follow up with the patient.    New Prescriptions    " No medications on file       Final diagnoses:   None       --  Christos Watson MD  [facility] EMERGENCY DEPARTMENT  5/7/2022     Christos Watson MD  05/10/22 1753       Christos Watson MD  05/10/22 1756

## 2022-05-08 VITALS
HEART RATE: 65 BPM | RESPIRATION RATE: 18 BRPM | DIASTOLIC BLOOD PRESSURE: 53 MMHG | WEIGHT: 293 LBS | HEIGHT: 63 IN | TEMPERATURE: 99.5 F | SYSTOLIC BLOOD PRESSURE: 106 MMHG | OXYGEN SATURATION: 98 % | BODY MASS INDEX: 51.91 KG/M2

## 2022-05-08 LAB
BACTERIA UR CULT: NORMAL
HGB BLD-MCNC: 9.6 G/DL (ref 11.7–15.7)
HOLD SPECIMEN: NORMAL
T PALLIDUM AB SER QL: NONREACTIVE

## 2022-05-08 PROCEDURE — 36415 COLL VENOUS BLD VENIPUNCTURE: CPT | Performed by: STUDENT IN AN ORGANIZED HEALTH CARE EDUCATION/TRAINING PROGRAM

## 2022-05-08 PROCEDURE — 250N000013 HC RX MED GY IP 250 OP 250 PS 637: Performed by: STUDENT IN AN ORGANIZED HEALTH CARE EDUCATION/TRAINING PROGRAM

## 2022-05-08 PROCEDURE — 85018 HEMOGLOBIN: CPT | Performed by: STUDENT IN AN ORGANIZED HEALTH CARE EDUCATION/TRAINING PROGRAM

## 2022-05-08 RX ORDER — FERROUS SULFATE 325(65) MG
325 TABLET, DELAYED RELEASE (ENTERIC COATED) ORAL DAILY
Qty: 90 TABLET | Refills: 1 | Status: SHIPPED | OUTPATIENT
Start: 2022-05-08 | End: 2022-05-08

## 2022-05-08 RX ORDER — METHYLERGONOVINE MALEATE 0.2 MG/1
0.2 TABLET ORAL 3 TIMES DAILY
Qty: 3 TABLET | Refills: 0 | Status: ON HOLD | OUTPATIENT
Start: 2022-05-08 | End: 2024-01-09

## 2022-05-08 RX ORDER — FERROUS SULFATE 325(65) MG
325 TABLET, DELAYED RELEASE (ENTERIC COATED) ORAL DAILY
Qty: 90 TABLET | Refills: 1 | Status: SHIPPED | OUTPATIENT
Start: 2022-05-08

## 2022-05-08 RX ADMIN — ACETAMINOPHEN 650 MG: 325 TABLET ORAL at 01:42

## 2022-05-08 RX ADMIN — IBUPROFEN 800 MG: 800 TABLET, FILM COATED ORAL at 02:40

## 2022-05-08 RX ADMIN — HYDROXYZINE HYDROCHLORIDE 100 MG: 50 TABLET, FILM COATED ORAL at 00:12

## 2022-05-08 RX ADMIN — IBUPROFEN 800 MG: 800 TABLET, FILM COATED ORAL at 11:38

## 2022-05-08 RX ADMIN — DOCUSATE SODIUM 100 MG: 100 CAPSULE, LIQUID FILLED ORAL at 12:51

## 2022-05-08 ASSESSMENT — ACTIVITIES OF DAILY LIVING (ADL)
TOILETING_ISSUES: NO
WALKING_OR_CLIMBING_STAIRS_DIFFICULTY: NO
FALL_HISTORY_WITHIN_LAST_SIX_MONTHS: NO
DOING_ERRANDS_INDEPENDENTLY_DIFFICULTY: NO
WEAR_GLASSES_OR_BLIND: YES
CHANGE_IN_FUNCTIONAL_STATUS_SINCE_ONSET_OF_CURRENT_ILLNESS/INJURY: NO
VISION_MANAGEMENT: GLASSES
DRESSING/BATHING_DIFFICULTY: NO
DIFFICULTY_EATING/SWALLOWING: NO
CONCENTRATING,_REMEMBERING_OR_MAKING_DECISIONS_DIFFICULTY: NO

## 2022-05-08 NOTE — PROGRESS NOTES
[facility]  Gynecology Progress Note      Subjective:  Patient is resting comfortably in bed. She has been doing really well since D&C yesterday. Reports that she is not having any pain. Bleeding has been very minimal.  Voiding spontaneously . Passing flatus/BM .  Ambulating without dizziness or difficulty .  She denies any nausea/vomiting, chest pain, shortness of breath, or other systemic complaints. She would like to go home today. Has decided that she would like an autopsy, and no chromosomal analysis testing.     Objective:  Patient Vitals for the past 24 hrs:   BP Temp Temp src Resp SpO2   22 0910 113/59 99  F (37.2  C) Oral 18 --   22 0235 104/52 98.1  F (36.7  C) Oral 16 --   22 2330 112/56 98.5  F (36.9  C) Oral 18 --   22 -- -- -- -- 98 %   22 -- -- -- -- 99 %   22 112/55 -- -- -- --   22 -- -- -- -- 100 %   22 -- 99.8  F (37.7  C) Oral 20 --   22 -- -- -- -- 100 %   22 -- -- -- -- 99 %   22 121/65 -- -- -- 100 %   22 -- -- -- -- 99 %   22 128/71 -- -- -- --   22 190 -- -- -- -- 97 %   22 1636 101/58 -- -- -- --   22 1621 100/54 -- -- -- --   22 1606 104/62 -- -- -- --   22 1552 98/58 -- -- -- --   22 1537 120/63 -- -- -- --   22 1530 -- 98.6  F (37  C) Oral -- --   22 1500 108/55 -- -- -- --     Gen: Resting comfortably, NAD  CV: Regular rate. Well perfused  Pulm: Non labored breathing on room air.   Abd: Soft, appropriately ttp, non-distended  Ext: Trace LE edema bilaterally     A/P:  Minda Silverman is a 26 year old on POD #1 s/p D&C following previable  in emergency department yesterday. Patient doing well today. Bleeding has been scant. Requesting to go home.     # Postpartum Hemorrhage  - total QBL approximately 1000 yesterday.   - Hgb 12>10.5>9.6. no symptoms of acute blood loss anemia  -  Scant bleeding overnight.  - Received KS miso, methergine. Currently on methergine TID for ongoing management of bleeding.    Dispo: Discharge home today.    Claudia Samano DO, MS  Obstetrics, Gynecology & Women's Health   Resident, PGY-1  05/08/2022 12:31 PM

## 2022-05-08 NOTE — PLAN OF CARE
Pt continued to have bleeding after meds/pitocin/frequent fundal checks. Benjy sweep done and speculum exam also done. Decision made to proceed with D&C. Transferred to OR at 1755, back to room at 1845 after having sedation in OR. Pt bleeding WNL after returning to room. C/o cramping/soreness with fundal checks, also had a headache after procedure that was improved with ibuprofen. Pt felt chilled and then hot/sweaty. Temp max 99.8 prior to ibuprofen. Other VSS, O2 sats WNL. Pt up to void without problems. 84cc bleeding from 8198-3558. Pt and significant other grieving appropriately, other family members have been here and are supportive. Continue to monitor bleeding, temps.

## 2022-05-08 NOTE — PLAN OF CARE
Goal Outcome Evaluation:    Plan of Care Reviewed With: patient, significant other     Overall Patient Progress: improving  Received discharge order.  Reviewed discharge AVS. Patient and partner verbalize gratitude for the care they received from staff. They have good support at home. Appropriate grieving. Questions answered. Discharged at 1420.

## 2022-05-08 NOTE — PLAN OF CARE
Goal Outcome Evaluation:    Plan of Care Reviewed With: patient, significant other     Overall Patient Progress: improving  VSS afebrile.  Outcome Evaluation: patient asymptomatic with HGB of 9.6, scant lochia rubra, no clots, mild cramping controlled with ibuprofen. Desires discharge home ASAP

## 2022-05-08 NOTE — PROVIDER NOTIFICATION
Patient verbalized she would like an autopsy performed on fetal remains, but not chromosome analysis. Reported to Dr Quintana, she reviewed informed consent.

## 2022-05-09 ENCOUNTER — PATIENT OUTREACH (OUTPATIENT)
Dept: CARE COORDINATION | Facility: CLINIC | Age: 27
End: 2022-05-09
Payer: COMMERCIAL

## 2022-05-09 DIAGNOSIS — Z71.89 OTHER SPECIFIED COUNSELING: ICD-10-CM

## 2022-05-09 NOTE — PROGRESS NOTES
Clinic Care Coordination Contact  [facility]/Voicemail       Clinical Data: Care Coordinator Outreach  Outreach attempted x 1.  Left message on patient's voicemail with call back information and requested return call.  Plan: CC will attempt to reach patient again in 1-2 business days.    BARBIE Son   Social Work Clinic Care Coordinator   [facility]  PH: [phone]  [email or web address]

## 2022-05-10 ENCOUNTER — DOCUMENTATION ONLY (OUTPATIENT)
Dept: CARE COORDINATION | Facility: CLINIC | Age: 27
End: 2022-05-10
Payer: COMMERCIAL

## 2022-05-10 LAB — BACTERIA PLACENTA AEROBE CULT: NORMAL

## 2022-05-10 NOTE — PROGRESS NOTES
SUNSHINE received call from Audra at [facility] that family responded to urn request, but she needs more info. SUNSHINE will call family.     Audra also mentioned that [facility] has not heard from the family. SUNSHINE offered to call [employer] to provide info and request that they call family since family is overwhelmed. Audra connected call to  home. SUNSHINE provided family info. Bel will call family directly. SUNSHINE spoke with Stephanie from [facility]. Audra suggested calling [facility] for all [facility] interactions.     SUNSHINE will follow up with family Wed 5/10/2022.     SUNSHINE will continue to follow.     REHANA Fernando, [facility]  Maternal and Child Health   Office: [phone]  Pager: [phone]  After Hours Pager: [phone]  [email or web address]

## 2022-05-11 ENCOUNTER — TELEPHONE (OUTPATIENT)
Dept: CARE COORDINATION | Facility: CLINIC | Age: 27
End: 2022-05-11
Payer: COMMERCIAL

## 2022-05-11 LAB — BACTERIA PLACENTA AEROBE CULT: NORMAL

## 2022-05-11 NOTE — PROGRESS NOTES
SW received email from [facility] that they need more information about the urn and ceremony. SW lvm with pt and her partner on their respective phones to check in and offer support and to check in about the urn and ceremony. SUNSHINE let family know that they will hear from the  home soon.     SW received call from MISAEL Chaidez. He states he is trying to cope as best he can. He doesn't have much support locally, but he relies on his sister who provides emotional support. He is not interested in therapy and wants time to process on his own. He and pt have picked an urn but he couldn't remember the info. He wishes SW to call pt to get this info. He is awaiting a call from the  home. SW encouraged him to reach out if he doesn't hear from them in 2 days. SW offered supportive counseling and empathetic counseling. SW encouraged dad to call in the future if he needs help finding supports.     SUNSHINE will continue to follow.     REHANA Fernando, [facility]  Maternal and Child Health   Office: [phone]  Pager: [phone]  After Hours Pager: [phone]  [email or web address]

## 2022-05-17 ENCOUNTER — DOCUMENTATION ONLY (OUTPATIENT)
Dept: CARE COORDINATION | Facility: CLINIC | Age: 27
End: 2022-05-17
Payer: COMMERCIAL

## 2022-05-17 LAB
PATH REPORT.COMMENTS IMP SPEC: NORMAL
PATH REPORT.COMMENTS IMP SPEC: NORMAL
PATH REPORT.FINAL DX SPEC: NORMAL
PATH REPORT.GROSS SPEC: NORMAL
PATH REPORT.MICROSCOPIC SPEC OTHER STN: NORMAL
PATH REPORT.RELEVANT HX SPEC: NORMAL
PHOTO IMAGE: NORMAL

## 2022-05-17 PROCEDURE — 88305 TISSUE EXAM BY PATHOLOGIST: CPT | Mod: 26 | Performed by: PATHOLOGY

## 2022-05-17 PROCEDURE — 88309 TISSUE EXAM BY PATHOLOGIST: CPT | Mod: 26 | Performed by: PATHOLOGY

## 2022-05-17 NOTE — PROGRESS NOTES
"SW received call from mom to check in. She has questions about what she needs to do with the  home. She prefers SW to call to get info. SUNSHINE called [facility] and they are awaiting a call from mom. SUNSHINE called mom to let her know and give her the number to call.     SUNSHINE asked about mom's coping. She mentioned she has been getting support from family and has \"good and bad moments. It comes and goes.\" SUNSHINE validated this as normal. Mom has trouble sleeping at night when she is alone and thinks about Dm. SUNSHINE offered the list of support groups again and mom is interested. She is not interested in therapy. SUNSHINE emailed list of support groups per mom's request.     REHANA Fernando, [facility]  Maternal and Child Health   Office: [phone]  Pager: [phone]  After Hours Pager: [phone]  [email or web address]    "

## 2022-09-11 ENCOUNTER — HEALTH MAINTENANCE LETTER (OUTPATIENT)
Age: 27
End: 2022-09-11

## 2022-11-29 ENCOUNTER — APPOINTMENT (OUTPATIENT)
Dept: GENERAL RADIOLOGY | Facility: CLINIC | Age: 27
End: 2022-11-29
Attending: EMERGENCY MEDICINE
Payer: COMMERCIAL

## 2022-11-29 ENCOUNTER — HOSPITAL ENCOUNTER (EMERGENCY)
Facility: CLINIC | Age: 27
Discharge: HOME | End: 2022-11-29
Attending: EMERGENCY MEDICINE | Admitting: EMERGENCY MEDICINE
Payer: COMMERCIAL

## 2022-11-29 VITALS
RESPIRATION RATE: 16 BRPM | SYSTOLIC BLOOD PRESSURE: 127 MMHG | HEART RATE: 100 BPM | OXYGEN SATURATION: 95 % | TEMPERATURE: 98.2 F | DIASTOLIC BLOOD PRESSURE: 82 MMHG

## 2022-11-29 DIAGNOSIS — R06.2 WHEEZING: ICD-10-CM

## 2022-11-29 DIAGNOSIS — B34.9 VIRAL ILLNESS: ICD-10-CM

## 2022-11-29 DIAGNOSIS — Z11.52 ENCOUNTER FOR SCREENING LABORATORY TESTING FOR SEVERE ACUTE RESPIRATORY SYNDROME CORONAVIRUS 2 (SARS-COV-2): ICD-10-CM

## 2022-11-29 LAB
FLUAV RNA SPEC QL NAA+PROBE: NEGATIVE
FLUBV RNA RESP QL NAA+PROBE: NEGATIVE
RSV RNA SPEC NAA+PROBE: NEGATIVE
SARS-COV-2 RNA RESP QL NAA+PROBE: NEGATIVE

## 2022-11-29 PROCEDURE — 250N000013 HC RX MED GY IP 250 OP 250 PS 637: Performed by: EMERGENCY MEDICINE

## 2022-11-29 PROCEDURE — 94640 AIRWAY INHALATION TREATMENT: CPT

## 2022-11-29 PROCEDURE — 71045 X-RAY EXAM CHEST 1 VIEW: CPT

## 2022-11-29 PROCEDURE — 99285 EMERGENCY DEPT VISIT HI MDM: CPT | Mod: CS,25

## 2022-11-29 PROCEDURE — 250N000009 HC RX 250: Performed by: EMERGENCY MEDICINE

## 2022-11-29 PROCEDURE — 99284 EMERGENCY DEPT VISIT MOD MDM: CPT | Mod: CS | Performed by: EMERGENCY MEDICINE

## 2022-11-29 PROCEDURE — C9803 HOPD COVID-19 SPEC COLLECT: HCPCS

## 2022-11-29 PROCEDURE — 250N000012 HC RX MED GY IP 250 OP 636 PS 637: Performed by: EMERGENCY MEDICINE

## 2022-11-29 PROCEDURE — 87637 SARSCOV2&INF A&B&RSV AMP PRB: CPT | Performed by: EMERGENCY MEDICINE

## 2022-11-29 RX ORDER — IBUPROFEN 600 MG/1
600 TABLET, FILM COATED ORAL ONCE
Status: COMPLETED | OUTPATIENT
Start: 2022-11-29 | End: 2022-11-29

## 2022-11-29 RX ORDER — PREDNISONE 20 MG/1
40 TABLET ORAL ONCE
Status: COMPLETED | OUTPATIENT
Start: 2022-11-29 | End: 2022-11-29

## 2022-11-29 RX ORDER — ACETAMINOPHEN 325 MG/1
975 TABLET ORAL ONCE
Status: COMPLETED | OUTPATIENT
Start: 2022-11-29 | End: 2022-11-29

## 2022-11-29 RX ORDER — IPRATROPIUM BROMIDE AND ALBUTEROL SULFATE 2.5; .5 MG/3ML; MG/3ML
3 SOLUTION RESPIRATORY (INHALATION) ONCE
Status: COMPLETED | OUTPATIENT
Start: 2022-11-29 | End: 2022-11-29

## 2022-11-29 RX ORDER — ALBUTEROL SULFATE 90 UG/1
2 AEROSOL, METERED RESPIRATORY (INHALATION) ONCE
Status: COMPLETED | OUTPATIENT
Start: 2022-11-29 | End: 2022-11-29

## 2022-11-29 RX ORDER — ALBUTEROL SULFATE 0.83 MG/ML
2.5 SOLUTION RESPIRATORY (INHALATION)
Status: DISCONTINUED | OUTPATIENT
Start: 2022-11-29 | End: 2022-11-29 | Stop reason: HOSPADM

## 2022-11-29 RX ORDER — PREDNISONE 20 MG/1
TABLET ORAL
Qty: 8 TABLET | Refills: 0 | Status: ON HOLD | OUTPATIENT
Start: 2022-11-29 | End: 2024-01-09

## 2022-11-29 RX ADMIN — ALBUTEROL SULFATE 2 PUFF: 90 AEROSOL, METERED RESPIRATORY (INHALATION) at 18:59

## 2022-11-29 RX ADMIN — ACETAMINOPHEN 975 MG: 325 TABLET, FILM COATED ORAL at 18:17

## 2022-11-29 RX ADMIN — ALBUTEROL SULFATE 2.5 MG: 2.5 SOLUTION RESPIRATORY (INHALATION) at 17:11

## 2022-11-29 RX ADMIN — PREDNISONE 40 MG: 20 TABLET ORAL at 17:11

## 2022-11-29 RX ADMIN — IBUPROFEN 600 MG: 600 TABLET ORAL at 18:18

## 2022-11-29 RX ADMIN — IPRATROPIUM BROMIDE AND ALBUTEROL SULFATE 3 ML: 2.5; .5 SOLUTION RESPIRATORY (INHALATION) at 18:16

## 2022-11-29 ASSESSMENT — ENCOUNTER SYMPTOMS
COUGH: 1
MYALGIAS: 1
SHORTNESS OF BREATH: 1
RHINORRHEA: 1

## 2022-11-29 ASSESSMENT — ACTIVITIES OF DAILY LIVING (ADL)
ADLS_ACUITY_SCORE: 33
ADLS_ACUITY_SCORE: 35

## 2022-11-29 NOTE — ED PROVIDER NOTES
[facility] EMERGENCY DEPARTMENT ([facility])    11/29/22      ED PROVIDER NOTE   ED 5 4:54 PM   History     Chief Complaint   Patient presents with     Generalized Body Aches     Pt state she has been coughing, runny nose x 2 days, symptoms worsening today, increased cough and SOB     The history is provided by the patient and medical records.     Minda Silverman is a 27 year old female who presents with generalized body aches, rhinorrhea, cough and shortness of breath x 2 days. She has been coughing to point where she has chest wall pain. She has tried Mucinex, no improvement. Patient is a smoker but hasn't smoked in the past 3-4 days due to symptoms. She continues to feel short of breath, had anxiety attack earlier because she couldn't breathe from nose or mouth. Continues to have chest congestion. No history of asthma.     Per [facility] records patient has had 2 doses of Pfizer COVID-19 vaccine, no boosters. She did get a flu shot earlier this year.     Past Medical History  Past Medical History:   Diagnosis Date     NO ACTIVE PROBLEMS      Past Surgical History:   Procedure Laterality Date     DILATION AND CURETTAGE SUCTION N/A 5/7/2022    Procedure: DILATION AND CURETTAGE, UTERUS, USING SUCTION;  Surgeon: Vika Hopper MD;  Location: [facility] L[facility]     predniSONE (DELTASONE) 20 MG tablet  docusate sodium (COLACE) 100 MG capsule  ferrous sulfate (FE TABS) 325 (65 Fe) MG EC tablet  methylergonovine (METHERGINE) 0.2 MG tablet  metoclopramide (REGLAN) 10 MG tablet  Prenatal Vit-Fe Fumarate-FA (PRENATAL VITAMINS) 28-0.8 MG TABS  VITAMIN  B-6 25 MG tablet      No Known Allergies  Family History  No family history on file.  Social History   Social History     Tobacco Use     Smoking status: Every Day     Years: 0.20     Types: Cigarettes     Smokeless tobacco: Never   Substance Use Topics     Alcohol use: No     Drug use: Never      Past medical history, past surgical history, medications, allergies,  family history, and social history were reviewed with the patient. No additional pertinent items.       Review of Systems   HENT: Positive for rhinorrhea.    Respiratory: Positive for cough and shortness of breath.    Musculoskeletal: Positive for myalgias.     A complete review of systems was performed with pertinent positives and negatives noted in the HPI, and all other systems negative.    Physical Exam   BP: 126/86  Pulse: 79  Temp: 98.7  F (37.1  C)  Resp: 20  SpO2: 94 %  Physical Exam  Constitutional:       General: She is not in acute distress.     Appearance: She is not diaphoretic.   HENT:      Head: Normocephalic and atraumatic.      Right Ear: External ear normal.      Left Ear: External ear normal.      Nose: Nose normal.   Eyes:      Extraocular Movements: Extraocular movements intact.      Conjunctiva/sclera: Conjunctivae normal.   Cardiovascular:      Rate and Rhythm: Normal rate and regular rhythm.      Heart sounds: Normal heart sounds.   Pulmonary:      Effort: Pulmonary effort is normal. No respiratory distress.      Breath sounds: Wheezing present.   Abdominal:      General: There is no distension.      Palpations: Abdomen is soft.      Tenderness: There is no abdominal tenderness.   Musculoskeletal:         General: No swelling or deformity.      Cervical back: Normal range of motion and neck supple.   Skin:     General: Skin is warm and dry.   Neurological:      Mental Status: Mental status is at baseline.      Comments: Alert, oriented   Psychiatric:         Mood and Affect: Mood normal.         Behavior: Behavior normal.         ED Course      Procedures       Results for orders placed or performed during the hospital encounter of 11/29/22   XR Chest Port 1 View     Status: None    Narrative    EXAM: XR CHEST PORT 1 VIEW  LOCATION: [facility]  DATE/TIME: 11/29/2022 5:31 PM    INDICATION: cough, wheey  COMPARISON: 06/07/2021      Impression     IMPRESSION: Shallow inspiration. Chest otherwise negative. Lungs remain clear.   Symptomatic; Yes; 11/27/2022 Influenza A/B & SARS-CoV2 (COVID-19) Virus PCR Multiplex Nasopharyngeal     Status: Normal    Specimen: Nasopharyngeal; Swab   Result Value Ref Range    Influenza A PCR Negative Negative    Influenza B PCR Negative Negative    RSV PCR Negative Negative    SARS CoV2 PCR Negative Negative    Narrative    Testing was performed using the Xpert Xpress CoV2/Flu/RSV Assay on the [employer] GeneXpert Instrument. This test should be ordered for the detection of SARS-CoV-2 and influenza viruses in individuals who meet clinical and/or epidemiological criteria. Test performance is unknown in asymptomatic patients. This test is for in vitro diagnostic use under the FDA EUA for laboratories certified under CLIA to perform high or moderate complexity testing. This test has not been FDA cleared or approved. A negative result does not rule out the presence of PCR inhibitors in the specimen or target RNA in concentration below the limit of detection for the assay. If only one viral target is positive but coinfection with multiple targets is suspected, the sample should be re-tested with another FDA cleared, approved, or authorized test, if coinfection would change clinical management. This test was validated by the [facility]. These laboratories are certified under the Clinical Laboratory Improvement Amendments of 1988 (CLIA-88) as qualified to perform high complexity laboratory testing.            Results for orders placed or performed during the hospital encounter of 11/29/22   XR Chest Port 1 View     Status: None    Narrative    EXAM: XR CHEST PORT 1 VIEW  LOCATION: [facility]  DATE/TIME: 11/29/2022 5:31 PM    INDICATION: cough, wheey  COMPARISON: 06/07/2021      Impression    IMPRESSION: Shallow inspiration. Chest otherwise negative. Lungs remain clear.    Symptomatic; Yes; 11/27/2022 Influenza A/B & SARS-CoV2 (COVID-19) Virus PCR Multiplex Nasopharyngeal     Status: Normal    Specimen: Nasopharyngeal; Swab   Result Value Ref Range    Influenza A PCR Negative Negative    Influenza B PCR Negative Negative    RSV PCR Negative Negative    SARS CoV2 PCR Negative Negative    Narrative    Testing was performed using the Xpert Xpress CoV2/Flu/RSV Assay on the [employer] GeneXpert Instrument. This test should be ordered for the detection of SARS-CoV-2 and influenza viruses in individuals who meet clinical and/or epidemiological criteria. Test performance is unknown in asymptomatic patients. This test is for in vitro diagnostic use under the FDA EUA for laboratories certified under CLIA to perform high or moderate complexity testing. This test has not been FDA cleared or approved. A negative result does not rule out the presence of PCR inhibitors in the specimen or target RNA in concentration below the limit of detection for the assay. If only one viral target is positive but coinfection with multiple targets is suspected, the sample should be re-tested with another FDA cleared, approved, or authorized test, if coinfection would change clinical management. This test was validated by the [facility]. These laboratories are certified under the Clinical Laboratory Improvement Amendments of 1988 (CLIA-88) as qualified to perform high complexity laboratory testing.     Medications   albuterol (PROVENTIL) neb solution 2.5 mg (2.5 mg Nebulization Given 11/29/22 1711)   predniSONE (DELTASONE) tablet 40 mg (40 mg Oral Given 11/29/22 1711)   ipratropium - albuterol 0.5 mg/2.5 mg/3 mL (DUONEB) neb solution 3 mL (3 mLs Nebulization Given 11/29/22 1816)   ibuprofen (ADVIL/MOTRIN) tablet 600 mg (600 mg Oral Given 11/29/22 1818)   acetaminophen (TYLENOL) tablet 975 mg (975 mg Oral Given 11/29/22 1817)   albuterol (PROVENTIL HFA/VENTOLIN HFA) inhaler (2 puffs Inhalation  Given 11/29/22 2739)        Assessments & Plan (with Medical Decision Making)   27-year-old female presents to us with a chief complaint of body aches cough and shortness of breath.  Previous records were reviewed.  Vital signs today unremarkable.  Differential includes but not limited to asthma, COPD, bronchitis, COVID, influenza, pneumonia.  Patient is wheezing on exam.  Chest x-ray was ordered and interpreted and was unremarkable.  Labs are interpreted and show negative influenza and COVID.  Patient was given initially an albuterol then a DuoNeb which did help with her symptoms.  We will give her a prescription for prednisone as well as an inhaler.  Recommend she follow-up with primary care return to us as needed and quit smoking    I have reviewed the nursing notes. I have reviewed the findings, diagnosis, plan and need for follow up with the patient.    New Prescriptions    PREDNISONE (DELTASONE) 20 MG TABLET    Take two tablets (= 40mg) each day for 4 days       Final diagnoses:   Viral illness   Wheezing       --    [facility] EMERGENCY DEPARTMENT  11/29/2022     Ken Kilpatrick,   11/29/22 2549

## 2022-11-29 NOTE — LETTER
November 29, 2022      To Whom It May Concern:      Minda Silverman was seen in our Emergency Department today, 11/29/22.  She is excused from work until 12/1/2022  Sincerely,        Ken Kilpatrick, DO

## 2022-11-29 NOTE — ED TRIAGE NOTES
Pt state she has been coughing, runny nose x 2 days, symptoms worsening today, increased cough and SOB, CP, stabbing sensation when inhaling     Triage Assessment     Row Name 11/29/22 7068       Triage Assessment (Adult)    Airway WDL WDL       Respiratory WDL    Respiratory WDL cough    Cough Frequency frequent    Cough Type congested;croupy  chest pain when breathing in       Skin Circulation/Temperature WDL    Skin Circulation/Temperature WDL WDL       Cardiac WDL    Cardiac WDL WDL       Peripheral/Neurovascular WDL    Peripheral Neurovascular WDL WDL       Cognitive/Neuro/Behavioral WDL    Cognitive/Neuro/Behavioral WDL WDL

## 2023-04-30 ENCOUNTER — HEALTH MAINTENANCE LETTER (OUTPATIENT)
Age: 28
End: 2023-04-30

## 2023-11-18 ENCOUNTER — APPOINTMENT (OUTPATIENT)
Dept: ULTRASOUND IMAGING | Facility: CLINIC | Age: 28
End: 2023-11-18
Attending: EMERGENCY MEDICINE
Payer: COMMERCIAL

## 2023-11-18 ENCOUNTER — HOSPITAL ENCOUNTER (EMERGENCY)
Facility: CLINIC | Age: 28
Discharge: HOME | End: 2023-11-18
Attending: EMERGENCY MEDICINE | Admitting: EMERGENCY MEDICINE
Payer: COMMERCIAL

## 2023-11-18 VITALS
WEIGHT: 293 LBS | TEMPERATURE: 98.5 F | SYSTOLIC BLOOD PRESSURE: 126 MMHG | OXYGEN SATURATION: 98 % | DIASTOLIC BLOOD PRESSURE: 77 MMHG | RESPIRATION RATE: 16 BRPM | HEART RATE: 77 BPM | HEIGHT: 62 IN | BODY MASS INDEX: 53.92 KG/M2

## 2023-11-18 DIAGNOSIS — B96.89 BACTERIAL VAGINOSIS: ICD-10-CM

## 2023-11-18 DIAGNOSIS — N76.0 BACTERIAL VAGINOSIS: ICD-10-CM

## 2023-11-18 DIAGNOSIS — Z34.91 FIRST TRIMESTER PREGNANCY: ICD-10-CM

## 2023-11-18 LAB
ALBUMIN UR-MCNC: 10 MG/DL
APPEARANCE UR: ABNORMAL
BILIRUB UR QL STRIP: NEGATIVE
CLUE CELLS: PRESENT
COLOR UR AUTO: YELLOW
GLUCOSE UR STRIP-MCNC: NEGATIVE MG/DL
HCG INTACT+B SERPL-ACNC: ABNORMAL MIU/ML
HCG UR QL: POSITIVE
HGB UR QL STRIP: NEGATIVE
KETONES UR STRIP-MCNC: NEGATIVE MG/DL
LEUKOCYTE ESTERASE UR QL STRIP: NEGATIVE
MUCOUS THREADS #/AREA URNS LPF: PRESENT /LPF
NITRATE UR QL: NEGATIVE
PH UR STRIP: 5.5 [PH] (ref 5–7)
RBC URINE: <1 /HPF
SP GR UR STRIP: 1.03 (ref 1–1.03)
SQUAMOUS EPITHELIAL: 10 /HPF
TRICHOMONAS, WET PREP: ABNORMAL
UROBILINOGEN UR STRIP-MCNC: NORMAL MG/DL
WBC URINE: <1 /HPF
WBC'S/HIGH POWER FIELD, WET PREP: ABNORMAL
YEAST, WET PREP: ABNORMAL

## 2023-11-18 PROCEDURE — 84702 CHORIONIC GONADOTROPIN TEST: CPT | Performed by: EMERGENCY MEDICINE

## 2023-11-18 PROCEDURE — 81001 URINALYSIS AUTO W/SCOPE: CPT | Performed by: EMERGENCY MEDICINE

## 2023-11-18 PROCEDURE — 76801 OB US < 14 WKS SINGLE FETUS: CPT

## 2023-11-18 PROCEDURE — 99284 EMERGENCY DEPT VISIT MOD MDM: CPT | Mod: 25 | Performed by: EMERGENCY MEDICINE

## 2023-11-18 PROCEDURE — 87491 CHLMYD TRACH DNA AMP PROBE: CPT | Mod: 59 | Performed by: EMERGENCY MEDICINE

## 2023-11-18 PROCEDURE — 87491 CHLMYD TRACH DNA AMP PROBE: CPT | Performed by: EMERGENCY MEDICINE

## 2023-11-18 PROCEDURE — 87591 N.GONORRHOEAE DNA AMP PROB: CPT | Performed by: EMERGENCY MEDICINE

## 2023-11-18 PROCEDURE — 81025 URINE PREGNANCY TEST: CPT | Performed by: EMERGENCY MEDICINE

## 2023-11-18 PROCEDURE — 99284 EMERGENCY DEPT VISIT MOD MDM: CPT | Performed by: EMERGENCY MEDICINE

## 2023-11-18 PROCEDURE — 87210 SMEAR WET MOUNT SALINE/INK: CPT | Performed by: EMERGENCY MEDICINE

## 2023-11-18 PROCEDURE — 36415 COLL VENOUS BLD VENIPUNCTURE: CPT | Performed by: EMERGENCY MEDICINE

## 2023-11-18 PROCEDURE — 87591 N.GONORRHOEAE DNA AMP PROB: CPT | Mod: 59 | Performed by: EMERGENCY MEDICINE

## 2023-11-18 RX ORDER — METRONIDAZOLE 500 MG/1
500 TABLET ORAL 2 TIMES DAILY
Qty: 14 TABLET | Refills: 0 | Status: SHIPPED | OUTPATIENT
Start: 2023-11-18 | End: 2023-11-25

## 2023-11-18 ASSESSMENT — ACTIVITIES OF DAILY LIVING (ADL)
ADLS_ACUITY_SCORE: 35

## 2023-11-18 NOTE — ED TRIAGE NOTES
Triage Assessment (Adult)       Row Name 11/18/23 1405          Triage Assessment    Airway WDL WDL        Respiratory WDL    Respiratory WDL WDL        Skin Circulation/Temperature WDL    Skin Circulation/Temperature WDL WDL        Cardiac WDL    Cardiac WDL WDL        Peripheral/Neurovascular WDL    Peripheral Neurovascular WDL WDL        Cognitive/Neuro/Behavioral WDL    Cognitive/Neuro/Behavioral WDL WDL

## 2023-11-18 NOTE — ED PROVIDER NOTES
"ED Provider Note  [facility]      History     Chief Complaint   Patient presents with    Pregnancy Complications     Pt states she is having cramps and pregnant, last period was in September.  Pt has history of miscarriage so worried.  No bleeding or spotting.      HPI  Minda Silverman is a 28 year old female  who presents to the ED with lower abdominal pain for the past 2 days.  She is pregnant with her LMP mid September.  She is worried about her pregnancy so came here for evaluation. She had a spontaneous miscarriage at 5 months last year.  She denies urinary symptoms, vaginal discharge or bleeding. She had an in clinic positive pregnancy test prior to this visit but no other testing yet.  She has an appointment in 2 weeks and will go to the [facility] clinic.  She has occasional constipation.         Physical Exam   BP: 126/77  Pulse: 77  Temp: 98.5  F (36.9  C)  Resp: 16  Height: 157.5 cm (5' 2\")  Weight: 148.8 kg (328 lb)  SpO2: 98 %  Physical Exam  Vitals and nursing note reviewed. Exam conducted with a chaperone present.   Constitutional:       Appearance: She is obese.   HENT:      Head: Normocephalic and atraumatic.      Nose: No congestion or rhinorrhea.   Eyes:      Extraocular Movements: Extraocular movements intact.   Cardiovascular:      Rate and Rhythm: Normal rate and regular rhythm.   Pulmonary:      Effort: Pulmonary effort is normal.      Breath sounds: Normal breath sounds.   Abdominal:      General: There is no distension.      Palpations: Abdomen is soft. There is no mass.      Tenderness: There is no abdominal tenderness. There is no guarding or rebound.      Hernia: No hernia is present.   Genitourinary:     General: Normal vulva.      Exam position: Supine.      Vagina: Normal.      Cervix: Normal.      Uterus: Normal.       Adnexa: Right adnexa normal and left adnexa normal.   Musculoskeletal:      Cervical back: Normal range of motion. "   Skin:     General: Skin is warm and dry.   Neurological:      General: No focal deficit present.      Mental Status: She is alert and oriented to person, place, and time.   Psychiatric:         Mood and Affect: Mood normal.         ED Course, Procedures, & Data      Procedures               Results for orders placed or performed during the hospital encounter of 11/18/23   US OB <14 Weeks W Transvaginal     Status: None    Narrative    EXAM: US OB <14 WEEKS WITH TRANSVAGINAL SINGLE  LOCATION: [facility]  DATE: 11/18/2023    INDICATION: pain first trimester pregnancy  COMPARISON: Ultrasound 03/10/2022  TECHNIQUE: Transabdominal scans were performed. Endovaginal ultrasound was performed to better visualize the embryo.    FINDINGS:  UTERUS: Single normal appearing intrauterine gestation sac.  CRL: Measures 0.77 cm, equals 6 weeks 5 days.  FETAL HEART RATE: 134 bpm.  AMNIOTIC FLUID: Normal.  PLACENTA: Not yet formed. No evidence for sub-chorionic hemorrhage.    RIGHT OVARY: Normal.  LEFT OVARY: Normal.      Impression    IMPRESSION:   1.  Single living intrauterine gestation at 6 weeks 5 days, EDC 07/07/2024.       UA with Microscopic reflex to Culture     Status: Abnormal    Specimen: Urine, NOS   Result Value Ref Range    Color Urine Yellow Colorless, Straw, Light Yellow, Yellow    Appearance Urine Slightly Cloudy (A) Clear    Glucose Urine Negative Negative mg/dL    Bilirubin Urine Negative Negative    Ketones Urine Negative Negative mg/dL    Specific Gravity Urine 1.031 1.003 - 1.035    Blood Urine Negative Negative    pH Urine 5.5 5.0 - 7.0    Protein Albumin Urine 10 (A) Negative mg/dL    Urobilinogen Urine Normal Normal, 2.0 mg/dL    Nitrite Urine Negative Negative    Leukocyte Esterase Urine Negative Negative    Mucus Urine Present (A) None Seen /LPF    RBC Urine <1 <=2 /HPF    WBC Urine <1 <=5 /HPF    Squamous Epithelials Urine 10 (H) <=1 /HPF    Narrative    Urine  Culture not indicated   HCG quantitative pregnancy (blood)     Status: Abnormal   Result Value Ref Range    hCG Quantitative 31,975 (H) <5 mIU/mL   HCG qualitative urine (UPT)     Status: Abnormal   Result Value Ref Range    hCG Urine Qualitative Positive (A) Negative   Wet prep     Status: Abnormal    Specimen: Vagina; Swab   Result Value Ref Range    Trichomonas Absent Absent    Yeast Absent Absent    Clue Cells Present (A) Absent    WBCs/high power field 2+ (A) None     Medications - No data to display  Labs Ordered and Resulted from Time of ED Arrival to Time of ED Departure   ROUTINE UA WITH MICROSCOPIC REFLEX TO CULTURE - Abnormal       Result Value    Color Urine Yellow      Appearance Urine Slightly Cloudy (*)     Glucose Urine Negative      Bilirubin Urine Negative      Ketones Urine Negative      Specific Gravity Urine 1.031      Blood Urine Negative      pH Urine 5.5      Protein Albumin Urine 10 (*)     Urobilinogen Urine Normal      Nitrite Urine Negative      Leukocyte Esterase Urine Negative      Mucus Urine Present (*)     RBC Urine <1      WBC Urine <1      Squamous Epithelials Urine 10 (*)    HCG QUANTITATIVE PREGNANCY - Abnormal    hCG Quantitative 31,975 (*)    HCG QUALITATIVE URINE - Abnormal    hCG Urine Qualitative Positive (*)    WET PREPARATION - Abnormal    Trichomonas Absent      Yeast Absent      Clue Cells Present (*)     WBCs/high power field 2+ (*)    CHLAMYDIA TRACHOMATIS/NEISSERIA GONORRHOEAE BY PCR   CHLAMYDIA TRACHOMATIS/NEISSERIA GONORRHOEAE BY PCR     US OB <14 Weeks W Transvaginal   Final Result   IMPRESSION:    1.  Single living intrauterine gestation at 6 weeks 5 days, EDC 07/07/2024.                   Critical care was not performed.     Medical Decision Making  The patient's presentation was of moderate complexity (an acute illness with systemic symptoms).    The patient's evaluation involved:  ordering and/or review of 3+ test(s) in this encounter (see separate area of note  for details)    The patient's management necessitated moderate risk (prescription drug management including medications given in the ED).    Assessment & Plan    Minda Silverman is a 28 year old female  who presents to the ED with lower abdominal pain for the past 2 days.  She denies vaginal bleeding, discharge, urinary symptoms.  Pelvic exam is normal.  No mass or cmt.  Ua checked and does not suggest uti.  Wet prep suggests bacterial vaginosis.  Gonorrhea/chlamydia sent and pending but will not treat unless returns positive result.  OB ultrasound ordered to evaluate pregnancy.  Ultrasound returned reassuring and without concerns.  She was discharged home with flagyl for bacterial vaginosis.     I have reviewed the nursing notes. I have reviewed the findings, diagnosis, plan and need for follow up with the patient.    Discharge Medication List as of 2023  8:20 PM        START taking these medications    Details   metroNIDAZOLE (FLAGYL) 500 MG tablet Take 1 tablet (500 mg) by mouth 2 times daily for 7 days, Disp-14 tablet, R-0, Local Print[employer] yogurt or cottage cheese daily to prevent diarrhea that can be caused by taking this antibiotic.             Final diagnoses:   Bacterial vaginosis   First trimester pregnancy       Torie Rubio MD  [facility] EMERGENCY DEPARTMENT  2023     Torie Rubio MD  [phone]

## 2023-11-18 NOTE — ED NOTES
Cramping since yesterday in upper and lower abdomen; confirms pregnancy but gestation unknown; denies vaginal bleeding

## 2023-11-19 LAB
C TRACH DNA SPEC QL PROBE+SIG AMP: NEGATIVE
C TRACH DNA SPEC QL PROBE+SIG AMP: NEGATIVE
N GONORRHOEA DNA SPEC QL NAA+PROBE: NEGATIVE
N GONORRHOEA DNA SPEC QL NAA+PROBE: NEGATIVE

## 2023-11-19 NOTE — DISCHARGE INSTRUCTIONS
Here in the emergency room you have reassuring evaluation.  Your ultrasound shows intrauterine pregnancy which is reassuring.  We discussed how your wet prep swab does show findings consistent with bacterial vaginosis.  Prescription for Flagyl was given to you today you should start this as directed.  Your urine sample does not show any findings for UTI.  Recommend following up closely with your obstetrician through the [facility] service with your upcoming appointment that is scheduled.  We discussed returning back to the emergency room if you develop any new or worsening symptoms including any vaginal bleeding abdominal pain vomiting fevers any other new or worsening symptoms.

## 2023-12-05 ENCOUNTER — MEDICAL CORRESPONDENCE (OUTPATIENT)
Dept: HEALTH INFORMATION MANAGEMENT | Facility: CLINIC | Age: 28
End: 2023-12-05
Payer: COMMERCIAL

## 2023-12-07 ENCOUNTER — MEDICAL CORRESPONDENCE (OUTPATIENT)
Dept: HEALTH INFORMATION MANAGEMENT | Facility: CLINIC | Age: 28
End: 2023-12-07
Payer: COMMERCIAL

## 2023-12-08 ENCOUNTER — TRANSFERRED RECORDS (OUTPATIENT)
Dept: HEALTH INFORMATION MANAGEMENT | Facility: CLINIC | Age: 28
End: 2023-12-08
Payer: COMMERCIAL

## 2023-12-08 ENCOUNTER — TRANSCRIBE ORDERS (OUTPATIENT)
Dept: MATERNAL FETAL MEDICINE | Facility: CLINIC | Age: 28
End: 2023-12-08
Payer: COMMERCIAL

## 2023-12-08 DIAGNOSIS — O26.90 PREGNANCY RELATED CONDITION, ANTEPARTUM: Primary | ICD-10-CM

## 2024-01-05 ENCOUNTER — OFFICE VISIT (OUTPATIENT)
Dept: MATERNAL FETAL MEDICINE | Facility: CLINIC | Age: 29
End: 2024-01-05
Attending: OBSTETRICS & GYNECOLOGY
Payer: COMMERCIAL

## 2024-01-05 ENCOUNTER — LAB (OUTPATIENT)
Dept: LAB | Facility: CLINIC | Age: 29
End: 2024-01-05
Attending: OBSTETRICS & GYNECOLOGY
Payer: COMMERCIAL

## 2024-01-05 ENCOUNTER — HOSPITAL ENCOUNTER (OUTPATIENT)
Dept: ULTRASOUND IMAGING | Facility: CLINIC | Age: 29
Discharge: HOME | End: 2024-01-05
Attending: OBSTETRICS & GYNECOLOGY
Payer: COMMERCIAL

## 2024-01-05 ENCOUNTER — MEDICAL CORRESPONDENCE (OUTPATIENT)
Dept: HEALTH INFORMATION MANAGEMENT | Facility: CLINIC | Age: 29
End: 2024-01-05

## 2024-01-05 ENCOUNTER — PRE VISIT (OUTPATIENT)
Dept: MATERNAL FETAL MEDICINE | Facility: CLINIC | Age: 29
End: 2024-01-05
Payer: COMMERCIAL

## 2024-01-05 DIAGNOSIS — O34.31 CERVICAL INSUFFICIENCY DURING PREGNANCY IN FIRST TRIMESTER, ANTEPARTUM: Primary | ICD-10-CM

## 2024-01-05 DIAGNOSIS — O34.30 INCOMPETENT CERVIX IN PREGNANCY, ANTEPARTUM: Primary | ICD-10-CM

## 2024-01-05 DIAGNOSIS — Z36.9 PRENATAL SCREENING ENCOUNTER: ICD-10-CM

## 2024-01-05 DIAGNOSIS — O99.210 OBESITY IN PREGNANCY: ICD-10-CM

## 2024-01-05 PROCEDURE — 99214 OFFICE O/P EST MOD 30 MIN: CPT | Mod: 25 | Performed by: OBSTETRICS & GYNECOLOGY

## 2024-01-05 PROCEDURE — G0463 HOSPITAL OUTPT CLINIC VISIT: HCPCS | Mod: 25 | Performed by: OBSTETRICS & GYNECOLOGY

## 2024-01-05 PROCEDURE — 76813 OB US NUCHAL MEAS 1 GEST: CPT | Mod: 26 | Performed by: OBSTETRICS & GYNECOLOGY

## 2024-01-05 PROCEDURE — 36415 COLL VENOUS BLD VENIPUNCTURE: CPT

## 2024-01-05 PROCEDURE — 76813 OB US NUCHAL MEAS 1 GEST: CPT

## 2024-01-05 PROCEDURE — 96040 HC GENETIC COUNSELING, EACH 30 MINUTES: CPT

## 2024-01-05 NOTE — PROGRESS NOTES
"I was asked by Dr. Lynnette Daugherty to see Minda Ramses Silverman for a consult for cervical incompetence.    The patient pregnancy history is as follows:     - SAB at 14 weeks    2017 - She was incidentally noted to have cervical shortening with dilation at an ultrasound to evaluate fetal anatomy at 26 4/7 weeks gestation. She was admitted to the hospital and received a course of betamethasone at that time. During the hospitalization on exam she was noted to be 2 cm dilated with a bulging bag of water with bag protruding with valsalva in the absence of labor. She was subsequently discharged home. Minda was readmitted to the hospital at 27 4/7 weeks gestation with PPROM. She subsequently developed triple I and underwent induction of labor and had an  at 29 3/7 weeks gestation.      - Seen by [facility] for a consult with recommendations for serial cervix length weekly beginning at 16 weeks. She had her first cervix length at 17w2d and it was normal at 34 mm. At 18w6d she had the onset of pressure and presented to the ED with rapid delivery of the fetus. She underwent D&C for VB with retained placenta. There were no cervical lacerations seen.    The patient PMHx is otherwise remarkable for obesity with BMI >50.     Cervical incompetence - The patient has a history most consistent with incompetent cervix. In her second pregnancy she had painless cervical dilation at 26 weeks with subsequent PRPM/PTD. In her third pregnancy she delivered at 18+ weeks. At the time of that delivery she had her cervix assessed and there were not lacerations. I recommended to the patient that she have a history indicated cerclage and this has been scheduled on 24 at 10:30.    I spent a total of 30 minutes in all patient care activities on the day of this visit    A copy of this consultation is being sent to your office.    Please see \"Imaging\" tab under \"Chart Review\" for details of today's US at the [facility].    [place]" MD Kim  Maternal-Fetal Medicine

## 2024-01-05 NOTE — NURSING NOTE
Patient presents to [facility] for NT/GC at 13w4d due to hx PPROM with IOL and PTD d/t triple I, Hx 18w6d delivery d/t cervical insufficiency, obesity. Denies LOF, vaginal bleeding or cramping/contractions. SBAR given to M MD, see their note in Epic.

## 2024-01-05 NOTE — PROGRESS NOTES
[facility]  Genetic Counseling Consult    Patient:  Minda Silverman YOB: 1995   Date of Service:  1/05/24   MRN: [id number]    Minda was seen at the [facility] for genetic consultation. The indication for genetic counseling is desire to discuss options for genetic screening and diagnostics. The patient was accompanied to this visit by their /sister-in-law, Marina.    The session was conducted in English.        IMPRESSION/ PLAN   1. Minda has not had genetic screening in this pregnancy but elected to have screening today.     2. During today's [facility] visit, Minda had a blood draw for expanded non-invasive prenatal testing (also called NIPT, or cell-free DNA) through [employer]. The expanded NIPT screens for trisomy 21, 18, and 13 and 22q11.2 deletion syndrome. The patient opted to screen for sex chromosome aneuploidies, including reported fetal sex.  In accordance with current guidelines, other microdeletion syndromes and rare autosomal trisomies were not included, but reflex to include these conditions remains available with expanded NIPT if there is an indication later in the pregnancy. Results are expected in 5-10 days. The patient will be called with results and if they do not answer they requested a detailed message with results on their voicemail, NOT including the predicted fetal sex information. Instead, they would like the sex information communicated to Marina ([phone]). Patient was informed that results, including fetal sex, [facility]    3. Minda had a nuchal translucency ultrasound today. Please see the ultrasound report for further details.    4. Further recommendations include a MFM consultation and fetal anatomy level II ultrasound with [facility]. The upcoming consultation has been scheduled for 01/24/2024 and the ultrasound has been scheduled for 02/09/2024.     PREGNANCY HISTORY  "  /Parity:     Minda's pregnancy history is significant for:   One miscarriage at 14 weeks  One premature  at 29w3d due to PPROM  One fetal demise at 18w6d due to cervical insufficiency     CURRENT PREGNANCY   Current Age: 28 year old   Age at Delivery: 29 year old  OSIEL: 2024, by Ultrasound                                   Gestational Age: 13w4d  This pregnancy is a single gestation.   This pregnancy was conceived spontaneously.    MEDICAL HISTORY   Minda s reported medical history is not expected to impact pregnancy management or risks to fetal development. Her pregnancy history is expected to impact pregnancy management. She is scheduled to have a radiologic MFM consultation on 2024 to discuss the plan for caring for her in this pregnancy given her history.       FAMILY HISTORY   A three-generation family history was obtained previously by a genetic counselor on 2022 and is scanned under the \"Media\" tab in Epic. There were no significant updates provided today besides Minda's maternal grandfather passing away at 93. Please see the original documentation from 2022 for more information.  The family history was reported by Minda.    RISK ASSESSMENT FOR CHROMOSOME CONDITIONS   We explained that the risk for fetal chromosome abnormalities increases with maternal age. We discussed specific features of common chromosome abnormalities, including Down syndrome, trisomy 13, trisomy 18, and sex chromosome trisomies.    At age 28 at midtrimester, the risk to have a baby with Down syndrome is 1 in 760.   At age 28 at midtrimester, the risk to have a baby with any chromosome abnormality is 1 in 380.     We also discussed that current ACMG guidelines recommend that screening for 22q11.2 deletion syndrome be offered to all pregnant patients. 22q11.2 deletion syndrome has an estimated prevalence of 1 in 990 to 1 in 2148 (0.05-0.1%). Risk is not thought to increase with maternal age. Clinical " features are variable but include congenital heart defects, cleft palate, developmental delays, immune system deficiencies, and hearing loss. Approximately 90% of cases are de sonu (a sporadic new change in a pregnancy). Cell-free DNA screening for 22q11.2 deletion syndrome is available (expanded NIPS through [employer]). We discussed the limitations of cell-free DNA screening in detecting microdeletions and the possiblity of false positives and false negatives. The data on this condition is more limited, so there is not a positive or negative predictive value available for results interpretation.     Minda has not had genetic screening in this pregnancy but elected to have screening today.      GENETIC TESTING OPTIONS FOR CHROMOSOMAL CONDITIONS   Genetic testing during a pregnancy includes screening and diagnostic procedures.      Screening tests are non-invasive which means no risk to the pregnancy and includes ultrasounds and blood work. The benefits and limitations of screening were reviewed. Screening tests provide a risk assessment (chance) specific to the pregnancy for certain fetal chromosome abnormalities but cannot definitively diagnose or exclude a fetal chromosome abnormality. Follow-up genetic counseling and consideration of diagnostic testing is recommended with any abnormal screening result. Diagnostic testing during a pregnancy is more certain and can test for more conditions. However, the tests do have a risk of miscarriage that requires careful consideration. These tests can detect fetal chromosome abnormalities with greater than 99% certainty. Results can be compromised by maternal cell contamination or mosaicism and are limited by the resolution of current genetic testing technology.     There is no screening or diagnostic test that detects all forms of birth defects or intellectual disability.     We discussed the following screening options:   Non-invasive prenatal testing (NIPT)  Also  called cell-free DNA screening because it detects chromosomes from the placenta in the pregnant person's blood  Can be done any time after 10 weeks gestation  Screens for trisomy 21, trisomy 18, trisomy 13, and sex chromosome aneuploidies  Expanded NIPS also allows for reflex to include other microdeletion conditions and rare autosomal trisomies if an indication would arise later in the pregnancy. The patient opted to include screening for 22q11.2 deletion syndrome.   Although not discussed today, it is important to mention that NIPT cannot screen for open neural tube defects, maternal serum AFP after 15 weeks is recommended    We discussed the following ultrasound options:  Nuchal translucency (NT) ultrasound  Ultrasound between [id number] that includes nuchal translucency measurement and nasal bone assessments  Nuchal translucency refers to the space at the back of the neck where fluid builds up. All babies at this stage have fluid and there is only concern if there is too much fluid  Nasal bone refers to the small bone in the nose. There is concern for conditions like Down syndrome if the bone cannot be seen at all  This ultrasound can be done as part of first trimester screening, at the same time as another screen (NIPT), at the same time as a CVS, or if the patients does not want genetic screening.  Markers on ultrasound detects about 70% of pregnancies with aneuploidy  Abnormalities on NT ultrasound can also increase the risk for a birth defect, like a heart defect    We discussed the following diagnostic options:   Amniocentesis  Invasive diagnostic procedure done after 15 weeks gestation  The procedure collects a small sample of amniotic fluid for the purpose of chromosomal testing and/or other genetic testing  Diagnostic result; more than 99% sensitivity for fetal chromosome abnormalities  Testing for AFP in the amniotic fluid can test for open neural tube defects    CARRIER SCREENING   Expanded carrier  screening is available to screen for autosomal recessive conditions and X-linked conditions in a large list of genes. Autosomal recessive conditions happen when a mutation has been inherited from the egg and sperm and include conditions like cystic fibrosis, thalassemia, hearing loss, spinal muscular atrophy, and more. X-linked conditions happen when a mutation has been inherited from the egg and include conditions like fragile X syndrome.  screening was also reviewed. About [place]  Screening    The patient was not certain about whether to pursue carrier screening today. Minda would like to discuss this option further with her partner, Dm. An educational handout about carrier screening and CPT codes were provided. They will contact us if they would like to pursue screening. We discussed the following:   Carrier screening does not test the pregnancy but gives a risk assessment for the pregnancy and future pregnancies to have the condition  If both partners are carriers of the same condition, there is a 1 in 4 (25%) chance for any pregnancies they have together to have the condition   There are different size panels or list of conditions for carrier screening  Carrier screening does not test for all genetic and health conditions or risk factors  There are limitations to current technology and results may be updated at a later date  The patient's partner will be recommended to have carrier screening if the patient is found to be a carrier for an autosomal recessive condition.  If an individual is a carrier, family members could be as well. The patient is encouraged to share this information with relatives.         It was a pleasure to be involved with Minda s care. Face-to-face time of the meeting was  25  minutes.    Lynnette Boggs MS, [facility]  Maternal Fetal Medicine  Office: [phone]  [facility]: [phone]   Fax: [phone]  [facility]

## 2024-01-05 NOTE — NURSING NOTE
Dr. Alvarez recommending a cervical cerclage early next week for hx of cervical insufficiency. Pt agreeable to a cerclage. Contacted Margi Spivey RN and she will get patient scheduled for a cerclage at 1030 on 1/9/24 at [facility]. Gave patient cerclage information sheets and educated pt to not eat 8 hours prior to surgery and that she may have clear liquids up to 2 hours before surgery. Pt aware she needs to be in labor and delivery at 0830 on 1/9/24. Map provided. Pt will come back to [facility] for a L2 US after 18 weeks gestation.   Jada Boucher RN on 1/5/2024 at 1:23 PM

## 2024-01-08 ENCOUNTER — ANESTHESIA EVENT (OUTPATIENT)
Dept: OBGYN | Facility: CLINIC | Age: 29
End: 2024-01-08
Payer: COMMERCIAL

## 2024-01-08 ASSESSMENT — LIFESTYLE VARIABLES: TOBACCO_USE: 1

## 2024-01-09 ENCOUNTER — HOSPITAL ENCOUNTER (OUTPATIENT)
Facility: CLINIC | Age: 29
Discharge: HOME | End: 2024-01-09
Attending: OBSTETRICS & GYNECOLOGY | Admitting: OBSTETRICS & GYNECOLOGY
Payer: COMMERCIAL

## 2024-01-09 ENCOUNTER — ANESTHESIA (OUTPATIENT)
Dept: OBGYN | Facility: CLINIC | Age: 29
End: 2024-01-09
Payer: COMMERCIAL

## 2024-01-09 ENCOUNTER — HOSPITAL ENCOUNTER (OUTPATIENT)
Facility: CLINIC | Age: 29
End: 2024-01-09
Admitting: OBSTETRICS & GYNECOLOGY
Payer: COMMERCIAL

## 2024-01-09 VITALS
SYSTOLIC BLOOD PRESSURE: 111 MMHG | OXYGEN SATURATION: 100 % | RESPIRATION RATE: 15 BRPM | DIASTOLIC BLOOD PRESSURE: 56 MMHG | HEART RATE: 62 BPM | TEMPERATURE: 98 F

## 2024-01-09 PROBLEM — N88.3 CERVICAL INCOMPETENCE: Status: ACTIVE | Noted: 2024-01-09

## 2024-01-09 LAB
ABO/RH(D): NORMAL
ANTIBODY SCREEN: NEGATIVE
BASOPHILS # BLD AUTO: 0 10E3/UL (ref 0–0.2)
BASOPHILS NFR BLD AUTO: 0 %
EOSINOPHIL # BLD AUTO: 0.3 10E3/UL (ref 0–0.7)
EOSINOPHIL NFR BLD AUTO: 4 %
ERYTHROCYTE [DISTWIDTH] IN BLOOD BY AUTOMATED COUNT: 14.5 % (ref 10–15)
HCT VFR BLD AUTO: 39.3 % (ref 35–47)
HGB BLD-MCNC: 12.7 G/DL (ref 11.7–15.7)
IMM GRANULOCYTES # BLD: 0.1 10E3/UL
IMM GRANULOCYTES NFR BLD: 1 %
LYMPHOCYTES # BLD AUTO: 2.3 10E3/UL (ref 0.8–5.3)
LYMPHOCYTES NFR BLD AUTO: 27 %
MCH RBC QN AUTO: 29.3 PG (ref 26.5–33)
MCHC RBC AUTO-ENTMCNC: 32.3 G/DL (ref 31.5–36.5)
MCV RBC AUTO: 91 FL (ref 78–100)
MONOCYTES # BLD AUTO: 0.4 10E3/UL (ref 0–1.3)
MONOCYTES NFR BLD AUTO: 5 %
NEUTROPHILS # BLD AUTO: 5.3 10E3/UL (ref 1.6–8.3)
NEUTROPHILS NFR BLD AUTO: 63 %
NRBC # BLD AUTO: 0 10E3/UL
NRBC BLD AUTO-RTO: 0 /100
PLATELET # BLD AUTO: 347 10E3/UL (ref 150–450)
RBC # BLD AUTO: 4.33 10E6/UL (ref 3.8–5.2)
SPECIMEN EXPIRATION DATE: NORMAL
WBC # BLD AUTO: 8.4 10E3/UL (ref 4–11)

## 2024-01-09 PROCEDURE — 258N000003 HC RX IP 258 OP 636: Performed by: STUDENT IN AN ORGANIZED HEALTH CARE EDUCATION/TRAINING PROGRAM

## 2024-01-09 PROCEDURE — 59320 REVISION OF CERVIX: CPT | Mod: GC | Performed by: OBSTETRICS & GYNECOLOGY

## 2024-01-09 PROCEDURE — 258N000003 HC RX IP 258 OP 636

## 2024-01-09 PROCEDURE — 250N000013 HC RX MED GY IP 250 OP 250 PS 637: Performed by: STUDENT IN AN ORGANIZED HEALTH CARE EDUCATION/TRAINING PROGRAM

## 2024-01-09 PROCEDURE — 272N000001 HC OR GENERAL SUPPLY STERILE: Performed by: OBSTETRICS & GYNECOLOGY

## 2024-01-09 PROCEDURE — 370N000017 HC ANESTHESIA TECHNICAL FEE, PER MIN: Performed by: OBSTETRICS & GYNECOLOGY

## 2024-01-09 PROCEDURE — 360N000074 HC SURGERY LEVEL 1, PER MIN: Performed by: OBSTETRICS & GYNECOLOGY

## 2024-01-09 PROCEDURE — 86900 BLOOD TYPING SEROLOGIC ABO: CPT | Performed by: STUDENT IN AN ORGANIZED HEALTH CARE EDUCATION/TRAINING PROGRAM

## 2024-01-09 PROCEDURE — 710N000010 HC RECOVERY PHASE 1, LEVEL 2, PER MIN: Performed by: OBSTETRICS & GYNECOLOGY

## 2024-01-09 PROCEDURE — 999N000141 HC STATISTIC PRE-PROCEDURE NURSING ASSESSMENT: Performed by: OBSTETRICS & GYNECOLOGY

## 2024-01-09 PROCEDURE — 250N000011 HC RX IP 250 OP 636

## 2024-01-09 PROCEDURE — 85025 COMPLETE CBC W/AUTO DIFF WBC: CPT | Performed by: STUDENT IN AN ORGANIZED HEALTH CARE EDUCATION/TRAINING PROGRAM

## 2024-01-09 RX ORDER — CHLOROPROCAINE HYDROCHLORIDE 30 MG/ML
INJECTION, SOLUTION EPIDURAL; INFILTRATION; INTRACAUDAL; PERINEURAL PRN
Status: DISCONTINUED | OUTPATIENT
Start: 2024-01-09 | End: 2024-01-09

## 2024-01-09 RX ORDER — CITRIC ACID/SODIUM CITRATE 334-500MG
30 SOLUTION, ORAL ORAL ONCE
Status: COMPLETED | OUTPATIENT
Start: 2024-01-09 | End: 2024-01-09

## 2024-01-09 RX ORDER — LIDOCAINE 40 MG/G
CREAM TOPICAL
Status: DISCONTINUED | OUTPATIENT
Start: 2024-01-09 | End: 2024-01-09 | Stop reason: HOSPADM

## 2024-01-09 RX ORDER — ONDANSETRON 4 MG/1
4 TABLET, ORALLY DISINTEGRATING ORAL EVERY 30 MIN PRN
Status: DISCONTINUED | OUTPATIENT
Start: 2024-01-09 | End: 2024-01-09 | Stop reason: HOSPADM

## 2024-01-09 RX ORDER — ACETAMINOPHEN 325 MG/1
975 TABLET ORAL ONCE
Status: COMPLETED | OUTPATIENT
Start: 2024-01-09 | End: 2024-01-09

## 2024-01-09 RX ORDER — FENTANYL CITRATE-0.9 % NACL/PF 10 MCG/ML
100 PLASTIC BAG, INJECTION (ML) INTRAVENOUS EVERY 5 MIN PRN
Status: DISCONTINUED | OUTPATIENT
Start: 2024-01-09 | End: 2024-01-09 | Stop reason: HOSPADM

## 2024-01-09 RX ORDER — SODIUM CHLORIDE, SODIUM LACTATE, POTASSIUM CHLORIDE, CALCIUM CHLORIDE 600; 310; 30; 20 MG/100ML; MG/100ML; MG/100ML; MG/100ML
INJECTION, SOLUTION INTRAVENOUS CONTINUOUS
Status: DISCONTINUED | OUTPATIENT
Start: 2024-01-09 | End: 2024-01-09 | Stop reason: HOSPADM

## 2024-01-09 RX ORDER — SODIUM CHLORIDE, SODIUM LACTATE, POTASSIUM CHLORIDE, CALCIUM CHLORIDE 600; 310; 30; 20 MG/100ML; MG/100ML; MG/100ML; MG/100ML
INJECTION, SOLUTION INTRAVENOUS CONTINUOUS PRN
Status: DISCONTINUED | OUTPATIENT
Start: 2024-01-09 | End: 2024-01-09

## 2024-01-09 RX ORDER — ONDANSETRON 2 MG/ML
4 INJECTION INTRAMUSCULAR; INTRAVENOUS EVERY 30 MIN PRN
Status: DISCONTINUED | OUTPATIENT
Start: 2024-01-09 | End: 2024-01-09 | Stop reason: HOSPADM

## 2024-01-09 RX ORDER — DIMENHYDRINATE 50 MG/ML
25 INJECTION, SOLUTION INTRAMUSCULAR; INTRAVENOUS
Status: DISCONTINUED | OUTPATIENT
Start: 2024-01-09 | End: 2024-01-09 | Stop reason: HOSPADM

## 2024-01-09 RX ADMIN — PHENYLEPHRINE HYDROCHLORIDE 100 MCG: 10 INJECTION INTRAVENOUS at 11:54

## 2024-01-09 RX ADMIN — SODIUM CHLORIDE, POTASSIUM CHLORIDE, SODIUM LACTATE AND CALCIUM CHLORIDE: 600; 310; 30; 20 INJECTION, SOLUTION INTRAVENOUS at 09:57

## 2024-01-09 RX ADMIN — PHENYLEPHRINE HYDROCHLORIDE 100 MCG: 10 INJECTION INTRAVENOUS at 11:34

## 2024-01-09 RX ADMIN — CHLOROPROCAINE HYDROCHLORIDE 1.8 ML: 30 INJECTION, SOLUTION EPIDURAL; INFILTRATION; INTRACAUDAL; PERINEURAL at 11:19

## 2024-01-09 RX ADMIN — SODIUM CHLORIDE, POTASSIUM CHLORIDE, SODIUM LACTATE AND CALCIUM CHLORIDE: 600; 310; 30; 20 INJECTION, SOLUTION INTRAVENOUS at 13:44

## 2024-01-09 RX ADMIN — SODIUM CITRATE AND CITRIC ACID MONOHYDRATE 30 ML: 500; 334 SOLUTION ORAL at 10:08

## 2024-01-09 RX ADMIN — PHENYLEPHRINE HYDROCHLORIDE 100 MCG: 10 INJECTION INTRAVENOUS at 11:24

## 2024-01-09 RX ADMIN — PHENYLEPHRINE HYDROCHLORIDE 100 MCG: 10 INJECTION INTRAVENOUS at 11:45

## 2024-01-09 RX ADMIN — PHENYLEPHRINE HYDROCHLORIDE 100 MCG: 10 INJECTION INTRAVENOUS at 11:38

## 2024-01-09 RX ADMIN — SODIUM CHLORIDE, POTASSIUM CHLORIDE, SODIUM LACTATE AND CALCIUM CHLORIDE: 600; 310; 30; 20 INJECTION, SOLUTION INTRAVENOUS at 10:35

## 2024-01-09 RX ADMIN — PHENYLEPHRINE HYDROCHLORIDE 0.5 MCG/KG/MIN: 10 INJECTION INTRAVENOUS at 11:47

## 2024-01-09 RX ADMIN — SODIUM CHLORIDE, POTASSIUM CHLORIDE, SODIUM LACTATE AND CALCIUM CHLORIDE: 600; 310; 30; 20 INJECTION, SOLUTION INTRAVENOUS at 11:32

## 2024-01-09 RX ADMIN — SODIUM CHLORIDE, POTASSIUM CHLORIDE, SODIUM LACTATE AND CALCIUM CHLORIDE 1000 ML: 600; 310; 30; 20 INJECTION, SOLUTION INTRAVENOUS at 09:44

## 2024-01-09 RX ADMIN — ACETAMINOPHEN 975 MG: 325 TABLET, FILM COATED ORAL at 13:44

## 2024-01-09 RX ADMIN — PHENYLEPHRINE HYDROCHLORIDE 100 MCG: 10 INJECTION INTRAVENOUS at 11:29

## 2024-01-09 ASSESSMENT — ACTIVITIES OF DAILY LIVING (ADL)
ADLS_ACUITY_SCORE: 20
ADLS_ACUITY_SCORE: 33

## 2024-01-09 NOTE — ANESTHESIA PROCEDURE NOTES
"Combined intrathecal/epidural Procedure Note    Pre-Procedure   Staff -        Anesthesiologist:  Lori Orozco MD       Resident/Fellow: Bakari Rai MD       Other Anesthesia Staff: Primo Lundberg MD       Performed By: anesthesiologist and resident       Location: OR       Pre-Anesthestic Checklist: patient identified, IV checked, risks and benefits discussed, informed consent, monitors and equipment checked, pre-op evaluation, at physician/surgeon's request and post-op pain management  Timeout:       Correct Patient: Yes        Correct Procedure: Yes        Correct Site: Yes        Correct Position: Yes   Procedure Documentation  Procedure: combined intrathecal/epidural       Diagnosis: Surgical Block for Cerclage       Patient Position: sitting       Skin prep: Chloraprep       Local skin infiltrated with mL of 1% lidocaine.        Insertion Site: L3-4. (midline approach).       Technique: LORT saline        SUSANA at 9.5 cm.       Needle Type: Touhy       Needle Gauge: 17.        Needle Length (Inches): 5        Spinal Needle Type: Pencan       Spinal Needle (gauge): 27        Spinal Needle Length (inches): 5       # of attempts: 3 and  # of redirects:  3    Assessment/Narrative         Paresthesias: No.       Insertion/Infusion Method: LORT saline       CSF fluid: with Spinal needle.CSF fluid removed: with Epidural needle - not with Epidural needle.     Comments:  Combined spinal epidural kit used with 11cm 17G Touhy. Eppidural needle used as introducer only to facilitate localizing space. No catheter placed. Single spinal injection done via 27G spinal needle.   Lori Rome MD        FOR [facility] ONLY:   Pain Team Contact information: please page the Pain Team Via [employer]. Search \"Pain\". During daytime hours, please page the attending first. At night please page the resident first.      "

## 2024-01-09 NOTE — OP NOTE
Operative Note: Cervical Cerclage         Pre-Op Diagnosis:   1) Single intrauterine pregnancy at 14w1d by 6w5d  2) Cervical insufficiency by history         Post-Op Diagnosis:   1) Same         Procedure:   1) Bryson cervical cerclage, one suture (knot at 12 o'clock position)         Surgeons:   Attending: Starr Watts MD  Fellow: LEONARDO Graham Fellow  Resident: Shayan Sheriff, PGY3  Medical Student: Romeo Umana MS4         Anesthesia:   Spinal          Estimated Blood Loss:   5cc         Findings:   1) Cervix not dilated prior to the procedure, closed following the procedure  2) Fetal heart tones confirmed by doptone following the procedure         Specimens:   1) None         Complications:   1) None apparent          History:     Minda Silverman is a 28 year oldy.o.  at 14w1d by 6w5d.  She has history of PPROM with a delivery in the 29th week followed by an 18 week delivery with painless cervical dilation..  After counseling regarding these findings, the patient has decided to proceed with history indicated cerclage.         Details of Procedure:   After administration of spinal anesthesia the patient was placed in the dorsal lithotomy position and prepped and draped in the usual fashion.  A weighted speculum was placed into the vagina and right angle retractors were used to visualize the cervix. The vagina and cervix were copiously cleansed with betadine solution.    The anterior lip of the cervix was grasped with a ring forcep.  There was minimal descensus and the vagina was long. Visualization was difficult. A circumferential suture of #2 Ethilon was placed in the usual fashion at the cervicovaginal reflection with knot tied at 12 o'clock position.  The suture was securely tied down and digital exam confirmed that the cervix was closed.    The bladder was drained of clear urine and a rectal exam confirmed that no sutures were present in the rectum. The cervix and vaginal vault were  inspected and noted to be free of injury and hemostatic.      The instruments were removed from the vagina. She tolerated her spinal anesthesia well without incident. She was subsequently transferred to the recovery room in satisfactory condition.    Sponge and needle counts were correct at the close of the case x 2.    The patient will likely need OR removal of the cerclage due to difficult visualization, which was discussed with the patient.    Tricia Barksdale MD  Maternal Fetal Medicine Fellow  1/9/2024 12:01 PM      Physician Attestation   I was present for the entire procedure between opening and closing.    I agree with plan for removal of cerclage on labor and delivery and potentially in the OR due to visualization concerns for removal at 36 weeks. Patient with OB care at [facility] and planning delivery at [facility] and ongoing Us surveillance with MFM at [facility].     Starr Watts MD  Date of Service (when I saw the patient): 01/09/24

## 2024-01-09 NOTE — PROGRESS NOTES
Brief Post-Op Note    Patient: Minda Silverman   : 1995   Mrn: [id number]     Subjective:   Patient feeling well following her procedure. Urinating spontaneously. Eating regular diet without nausea or vomiting. Ambulating without dizziness. Minimal vaginal bleeding. Only having some back pain, but this is mild.    Objective:  /56   Pulse 62   Temp 98  F (36.7  C)   Resp 15   LMP 09/15/2023 (Approximate)   SpO2 100%    Gen: well-appearing, NAD, resting comfortably in bed    Assessment and Plan: Minda is doing well following her procedure. She is meeting all post-op goals for discharge. All of her questions were answered. She was discharged to home.    Romeo Umana, MS4  [facility]

## 2024-01-09 NOTE — ANESTHESIA PREPROCEDURE EVALUATION
Anesthesia Pre-Procedure Evaluation    Patient: Minda Silverman   MRN: [id number] : 1995        Procedure : Procedure(s):  CERCLAGE, CERVIX, VAGINAL APPROACH          Past Medical History:   Diagnosis Date    NO ACTIVE PROBLEMS       Past Surgical History:   Procedure Laterality Date    DILATION AND CURETTAGE SUCTION N/A 2022    Procedure: DILATION AND CURETTAGE, UTERUS, USING SUCTION;  Surgeon: Vika Hopper MD;  Location: [facility] L+D      No Known Allergies   Social History     Tobacco Use    Smoking status: Former     Years: .2     Types: Cigarettes    Smokeless tobacco: Never   Substance Use Topics    Alcohol use: No      Wt Readings from Last 1 Encounters:   23 148.8 kg (328 lb)        Anesthesia Evaluation   Pt has had prior anesthetic. Type: MAC and [facility] (MAC in , labor epidural in . No reported complications.).        ROS/MED HX  ENT/Pulmonary: Comment:   Former smoker, and former e-cigarette/Vaping   Quit both    (+)     MARIA G risk factors,   obese,        tobacco use, Past use,                       Neurologic:  - neg neurologic ROS     Cardiovascular:  - neg cardiovascular ROS  (-) hypertension   METS/Exercise Tolerance:     Hematologic:     (+)      anemia,       (-) history of blood clots and history of blood transfusion   Musculoskeletal:  - neg musculoskeletal ROS     GI/Hepatic: Comment: Occasional nausea/emesis during current pregnancy. Doing well these past days/.  - neg GI/hepatic ROS     Renal/Genitourinary:       Endo:     (+)               Obesity,       Psychiatric/Substance Use:  - neg psychiatric ROS     Infectious Disease:  - neg infectious disease ROS     Malignancy:  - neg malignancy ROS     Other: Comment: , currently at 14w1d, here for history indicated cerclage  Obstetric history relevant for   -- SAB in 2013  --  PPROM & ultimately  at 29w3d in 2017 -- received labor epidural, reportedly SUSANA at 9.5cm. Uncomplicated  -- Fetal demise  "at 18w6d due to cervical insufficiency in 2022. -- Required MAC for D&C due to retained placenta.                 Physical Exam    Airway  airway exam normal      Mallampati: II   TM distance: > 3 FB   Neck ROM: full   Mouth opening: > 3 cm    Respiratory Devices and Support         Dental       (+) Minor Abnormalities - some fillings, tiny chips      Cardiovascular   cardiovascular exam normal       Rhythm and rate: normal     Pulmonary   pulmonary exam normal        breath sounds clear to auscultation           OUTSIDE LABS:  CBC:   Lab Results   Component Value Date    WBC 11.1 (H) 05/07/2022    WBC 8.3 03/10/2022    HGB 9.6 (L) 05/08/2022    HGB 10.5 (L) 05/07/2022    HCT 36.0 05/07/2022    HCT 37.9 03/10/2022     05/07/2022     03/10/2022     BMP:   Lab Results   Component Value Date     10/04/2021     06/07/2021    POTASSIUM 3.7 10/04/2021    POTASSIUM 3.8 06/07/2021    CHLORIDE 105 10/04/2021    CHLORIDE 105 06/07/2021    CO2 26 10/04/2021    CO2 28 06/07/2021    BUN 8 10/04/2021    BUN 8 06/07/2021    CR 0.63 10/04/2021    CR 0.81 06/07/2021    GLC 98 10/04/2021    GLC 88 06/07/2021     COAGS: No results found for: \"PTT\", \"INR\", \"FIBR\"  POC:   Lab Results   Component Value Date    HCG Positive (A) 11/18/2023    HCGS Negative 10/04/2021     HEPATIC:   Lab Results   Component Value Date    ALBUMIN 3.3 (L) 10/04/2021    PROTTOTAL 8.7 10/04/2021    ALT 53 (H) 10/04/2021    AST 26 10/04/2021    ALKPHOS 86 10/04/2021    BILITOTAL 0.6 10/04/2021     OTHER:   Lab Results   Component Value Date    LACT 1.2 06/07/2021    MAURILIO 9.0 10/04/2021    LIPASE 80 10/04/2021       Anesthesia Plan    ASA Status:  3    NPO Status:  NPO Appropriate    Anesthesia Type: Spinal.              Consents    Anesthesia Plan(s) and associated risks, benefits, and realistic alternatives discussed. Questions answered and patient/representative(s) expressed understanding.     - Discussed:     - Discussed with:  Patient "      - Extended Intubation/Ventilatory Support Discussed: No.      - Patient is DNR/DNI Status: No     Use of blood products discussed: No .     Postoperative Care    Pain management: Oral pain medications.   PONV prophylaxis: Ondansetron (or other 5HT-3)     Comments:             Lori Rome MD    I have reviewed the pertinent notes and labs in the chart from the past 30 days and (re)examined the patient.  Any updates or changes from those notes are reflected in this note.

## 2024-01-09 NOTE — DISCHARGE INSTRUCTIONS
"Cervical Cerclage to Prevent  Delivery: What to Expect at Home  Your Recovery  Cervical cerclage (say \"SER-vuh-kul ser-KLAZH\") is a procedure that helps keep your cervix from opening too soon. Your doctor has sewn your cervix shut to help prevent  labor.  For the next few days, you may have:  Cramping.  Spotting.  Pain when you urinate.  Your doctor may give you instructions on when you can do your normal activities again, such as driving and going back to work.  Your doctor will usually remove the stitches from your cervix at 36 to 38 weeks, or if you go into  labor or show signs of infection. If you are planning to have a , the stitches may be left in until you have the .  This care sheet gives you a general idea about how long it will take for you to recover. But each person recovers at a different pace. Follow the steps below to get better as quickly as possible.  How can you care for yourself at home?  Activity    Rest when you feel tired.     Ask your doctor when it is okay for you to have sex.   Medicines    Be safe with medicines. Read and follow all instructions on the label.     If you are not taking a prescription pain medicine, ask your doctor if you can take an over-the-counter medicine.     Your doctor will tell you if and when you can restart your medicines. The doctor will also give you instructions about taking any new medicines.   Follow-up care is a key part of your treatment and safety. Be sure to make and go to all appointments, and call your doctor if you are having problems. It's also a good idea to know your test results and keep a list of the medicines you take.  When should you call for help?   Call 911 anytime you think you may need emergency care. For example, call if:    You have severe trouble breathing.     You have sudden, severe pain in your belly.     You have severe vaginal bleeding.   Call your doctor now or seek immediate medical care if:    " "You have new pelvic pain, or the pain in your pelvis gets worse.     You have a new discharge from your vagina.     You have a fever.     You have any vaginal bleeding.     You have a sudden release of fluid from your vagina.     You think that you are in labor.     You have low back pain or pelvic pressure that does not go away.     You've been having regular contractions for an hour. This means that you've had at least 8 contractions within 1 hour or at least 4 contractions within 20 minutes, even after you change your position and drink fluids.   Watch closely for changes in your health, and be sure to contact your doctor if you have any problems.  Follow-up:  As scheduled in the clinic    Where can you learn more?  Go to https://www.healthwise.net/patiented  Enter K835 in the search box to learn more about \"Cervical Cerclage to Prevent  Delivery: What to Expect at Home.\"            "

## 2024-01-09 NOTE — PROVIDER NOTIFICATION
01/09/24 1444   Provider Notification   Provider Name/Title Dr Barksdale   Method of Notification Electronic Page   Notification Reason Status Update     Patient able to ambulate and urinate without difficulty. She is eating and denies nausea or vomiting. Reports low back pain, tylenol provided.

## 2024-01-09 NOTE — H&P
"[facility]  OB History and Physical      Minda Silverman MRN# [id number]   Age: 28 year old YOB: 1995     CC:  History of cervical insufficiency    HPI:  Ms. Minda Silverman is a 28 year old  at 14w1d by 6w5d US, who presents for cervical cerclage  She denies contractions, vaginal bleeding, and loss of fluid. She is doing well and has no questions from her consult. Just a little bit nervous. Support person, Dm, at the bedside.    History detailed in her pre-operative consultation with Dr. Alvarez on 24:    \" - SAB at 14 weeks     2017 - She was incidentally noted to have cervical shortening with dilation at an ultrasound to evaluate fetal anatomy at 26 4/7 weeks gestation. She was admitted to the hospital and received a course of betamethasone at that time. During the hospitalization on exam she was noted to be 2 cm dilated with a bulging bag of water with bag protruding with valsalva in the absence of labor. She was subsequently discharged home. Minda was readmitted to the hospital at 27 4/7 weeks gestation with PPROM. She subsequently developed triple I and underwent induction of labor and had an  at 29 3/7 weeks gestation.       - Seen by MFM for a consult with recommendations for serial cervix length weekly beginning at 16 weeks. She had her first cervix length at 17w2d and it was normal at 34 mm. At 18w6d she had the onset of pressure and presented to the ED with rapid delivery of the fetus. She underwent D&C for VB with retained placenta. There were no cervical lacerations seen.\"    Pregnancy Complications:  1.  History of previable birth  2.  History of PPROM with delivery at 29w3d  3.  Obesity    Prenatal Labs:   Lab Results   Component Value Date    AS Negative 2022    CHPCRT  2015     Negative   Negative for C. trachomatis rRNA by transcription mediated amplification.   A negative result by transcription mediated " amplification does not preclude the   presence of C. trachomatis infection because results are dependent on proper   and adequate collection, absence of inhibitors, and sufficient rRNA to be   detected.      GCPCRT  2015     Negative   Negative for N. gonorrhoeae rRNA by transcription mediated amplification.   A negative result by transcription mediated amplification does not preclude the   presence of N. gonorrhoeae infection because results are dependent on proper   and adequate collection, absence of inhibitors, and sufficient rRNA to be   detected.      HGB 9.6 (L) 2022     OB History  OB History    Para Term  AB Living   4 1 0 1 2 1   SAB IAB Ectopic Multiple Live Births   1 0 0 0 1      # Outcome Date GA Lbr Chris/2nd Weight Sex Delivery Anes PTL Lv   4 Current            3 AB 22 18w6d  0.218 kg (7.7 oz)     FD      Name: RICK WATTS,PENDING BABY BARRY FD      Apgar1: 0  Apgar5: 0   2  17 29w3d / 00:07 1.156 kg (2 lb 8.8 oz) F Vag-Spont EPI Y MARIA TERESA      Name: RICK,BABY GIRL      Apgar1: 6  Apgar5: 7   1 SAB  14w0d            PMHx:   Past Medical History:   Diagnosis Date    NO ACTIVE PROBLEMS      PSHx:   Past Surgical History:   Procedure Laterality Date    DILATION AND CURETTAGE SUCTION N/A 2022    Procedure: DILATION AND CURETTAGE, UTERUS, USING SUCTION;  Surgeon: Vika Hopper MD;  Location:  L+D     Meds:   Medications Prior to Admission   Medication Sig Dispense Refill Last Dose    docusate sodium (COLACE) 100 MG capsule Take 1 capsule by mouth twice a day as needed for constipation       ferrous sulfate (FE TABS) 325 (65 Fe) MG EC tablet Take 1 tablet (325 mg) by mouth daily 90 tablet 1     methylergonovine (METHERGINE) 0.2 MG tablet Take 1 tablet (200 mcg) by mouth 3 times daily 3 tablet 0     metoclopramide (REGLAN) 10 MG tablet Take 1 tablet (10 mg) by mouth 3 times daily as needed (headache and nausea vomiting) 12 tablet 0      predniSONE (DELTASONE) 20 MG tablet Take two tablets (= 40mg) each day for 4 days 8 tablet 0     Prenatal Vit-Fe Fumarate-FA (PRENATAL VITAMINS) 28-0.8 MG TABS Take 1 tablet by mouth daily       VITAMIN  B-6 25 MG tablet TAKE ONE TABLET BY MOUTH THREE TIMES DAILY AS NEEDED FOR NAUSEA        Allergies:  No Known Allergies   FmHx: History reviewed. No pertinent family history.  SocHx: She denies any tobacco, alcohol, or other drug use during this pregnancy.    ROS:   Complete 10-point ROS negative except as noted in HPI.    PE:  Vit: Patient Vitals for the past 4 hrs:   BP Temp Temp src Resp   24 0947 120/55 98.3  F (36.8  C) Oral 16      Gen: Well-appearing, NAD, comfortable   CV: rrr, no mrg   Pulm: Ctab, no wheezes or crackles   Abd: Soft, gravid, non-tender    Assessment/Plan  Minda Silverman is a 28 year old  female at 14w1d by 6w5d here for history indicated cervical cerclage.  Her pregnancy has otherwise complicated by history of PPROM, previable delivery, and obesity.    - Discussed risks of a cerclage including but not limited to: bleeding (including placental), infection (including chorioamnionitis), damage to surrounding structures including but not limited to bowel, bladder, cervix, risk of PPROM, failure of cerclage to prevent pre-viable, myla-viable or  birth.  - Doptones before and after the procedure  - Surgical consent signed  - NPO at this time  - Anesthesia alerted    The patient was discussed with Dr. Watts who is in agreement with the treatment plan.    Tricia Barksdale MD  Maternal Fetal Medicine Fellow  9:33 AM        Physician Attestation   I saw this patient with the resident and agree with the resident/fellow's findings and plan of care as documented in the note.      Key findings: I agree with plan to proceed with history indicated cervical cerclage due to cervical insufficiency in early pregnancy.   Decision making includes decision for medically indicated cervical  abdiel. Moderate decision making.         I have personally reviewed the following data over the past 24 hrs:    8.4  \   12.7   / 347     N/A N/A N/A /  N/A   N/A N/A N/A \         Starr Watts MD  Date of Service (when I saw the patient): 01/09/24

## 2024-01-09 NOTE — ANESTHESIA CARE TRANSFER NOTE
Patient: Minda Silverman    Procedure: Procedure(s):  CERCLAGE, CERVIX, VAGINAL APPROACH       Diagnosis: Cervical insufficiency during pregnancy in first trimester, antepartum [O34.31]  Diagnosis Additional Information: No value filed.    Anesthesia Type:   Spinal     Note:    Oropharynx: oropharynx clear of all foreign objects and spontaneously breathing  Level of Consciousness: awake  Oxygen Supplementation: room air    Independent Airway: airway patency satisfactory and stable  Dentition: dentition unchanged  Vital Signs Stable: post-procedure vital signs reviewed and stable  Report to RN Given: handoff report given  Patient transferred to: PACU    Handoff Report: Identifed the Patient, Identified the Reponsible Provider, Reviewed the pertinent medical history, Discussed the surgical course, Reviewed Intra-OP anesthesia mangement and issues during anesthesia, Set expectations for post-procedure period and Allowed opportunity for questions and acknowledgement of understanding      Vitals:  Vitals Value Taken Time   /49 01/09/24 1201   Temp     Pulse 71 01/09/24 1204   Resp 0 01/09/24 1204   SpO2 100 % 01/09/24 1204   Vitals shown include unfiled device data.    Electronically Signed By: Ruth Ann Barrera MD  January 9, 2024  12:05 PM

## 2024-01-09 NOTE — LETTER
January 9, 2024      To Whom It May Concern:      Minda Silverman was seen in our unit today for medical care, 01/09/24 and will need to be out of work on 1/10/2024 with return to work to follow.         Sincerely,        Starr Watts MD

## 2024-01-09 NOTE — PROGRESS NOTES
Report received from anesthesia. Bladder drained prior to procedure start and at completion. IV fluids infusing in PACU. Zofran given by anesthesia during procedure. Vitals WNL.

## 2024-01-12 ENCOUNTER — TELEPHONE (OUTPATIENT)
Dept: MATERNAL FETAL MEDICINE | Facility: CLINIC | Age: 29
End: 2024-01-12
Payer: COMMERCIAL

## 2024-01-12 LAB — SCANNED LAB RESULT: NORMAL

## 2024-01-12 NOTE — CONFIDENTIAL NOTE
January 12, 2024    I attempted to call Minda regarding her non-invasive prenatal testing results. I spoke with a relative of Simeon on the phone who shared she is at work and is unavailable until around 3 PM today. I will call Minda back later this afternoon.    Lynnette Boggs MS, [facility]  Maternal Fetal Medicine  Office: [phone]  [facility]: [phone]   Fax: [phone]  [facility]

## 2024-01-12 NOTE — CONFIDENTIAL NOTE
January 12, 2024    I spoke with Minda regarding her non-invasive prenatal screen (NIPS) results.     Results indicate NO ANEUPLOIDY DETECTED for chromosomes 21, 18, 13, or sex chromosomes. Results were also negative for 22q11.2 deletion syndrome. Predicted fetal sex information was discussed with MarinaMinad's sister-in-law/ ([phone]), per Minda's request.    This puts her current pregnancy at low risk for Down syndrome, trisomy 18, trisomy 13, sex chromosome abnormalities, and 22q11.2 deletion syndrome. This test is reported to have the following sensitivities: Down syndrome: 99.99%, trisomy 18: 99.99%, and trisomy 13: 99.99%. Although these results are reassuring, this does not replace a standard chromosome analysis from a chorionic villus sampling or amniocentesis.     MSAFP is the appropriate second trimester screening test for open neural tube defects; the maternal quad screen is not recommended.    Her results will be made available in her Epic chart for her primary OB to review.       Lynnette Boggs MS, [facility]  Maternal Fetal Medicine  Office: [phone]  [facility]: [phone]   Fax: [phone]  [facility]

## 2024-02-09 ENCOUNTER — OFFICE VISIT (OUTPATIENT)
Dept: MATERNAL FETAL MEDICINE | Facility: CLINIC | Age: 29
End: 2024-02-09
Attending: OBSTETRICS & GYNECOLOGY
Payer: MEDICAID

## 2024-02-09 ENCOUNTER — HOSPITAL ENCOUNTER (OUTPATIENT)
Dept: ULTRASOUND IMAGING | Facility: CLINIC | Age: 29
Discharge: HOME | End: 2024-02-09
Attending: OBSTETRICS & GYNECOLOGY
Payer: MEDICAID

## 2024-02-09 DIAGNOSIS — O99.210 OBESITY IN PREGNANCY: ICD-10-CM

## 2024-02-09 DIAGNOSIS — Z36.2 ENCOUNTER FOR FOLLOW-UP ULTRASOUND OF FETAL ANATOMY: ICD-10-CM

## 2024-02-09 DIAGNOSIS — O99.210 OBESITY IN PREGNANCY: Primary | ICD-10-CM

## 2024-02-09 PROCEDURE — 76811 OB US DETAILED SNGL FETUS: CPT | Mod: 26 | Performed by: OBSTETRICS & GYNECOLOGY

## 2024-02-09 PROCEDURE — 76811 OB US DETAILED SNGL FETUS: CPT

## 2024-02-09 NOTE — NURSING NOTE
Patient here with significant other today for anatomy ultrasound. Denies questions or concerns, does not want to know sex of baby. Medications and primary OB care provider reviewed with patient. SBAR given to Dr. Hayes, see her not in Epic.

## 2024-02-09 NOTE — PROGRESS NOTES
"Please see \"Imaging\" tab under \"Chart Review\" for details of today's visit.    Margi Hayes MD PhD  Maternal Fetal Medicine     "

## 2024-02-28 ENCOUNTER — APPOINTMENT (OUTPATIENT)
Dept: CT IMAGING | Facility: CLINIC | Age: 29
End: 2024-02-28
Payer: MEDICAID

## 2024-02-28 ENCOUNTER — HOSPITAL ENCOUNTER (EMERGENCY)
Facility: CLINIC | Age: 29
Discharge: HOME | End: 2024-02-28
Attending: FAMILY MEDICINE | Admitting: FAMILY MEDICINE
Payer: MEDICAID

## 2024-02-28 VITALS
WEIGHT: 293 LBS | SYSTOLIC BLOOD PRESSURE: 95 MMHG | TEMPERATURE: 97.8 F | RESPIRATION RATE: 16 BRPM | OXYGEN SATURATION: 97 % | BODY MASS INDEX: 58.47 KG/M2 | DIASTOLIC BLOOD PRESSURE: 55 MMHG | HEART RATE: 70 BPM

## 2024-02-28 DIAGNOSIS — G43.109 MIGRAINE WITH AURA AND WITHOUT STATUS MIGRAINOSUS, NOT INTRACTABLE: ICD-10-CM

## 2024-02-28 DIAGNOSIS — Q04.3: ICD-10-CM

## 2024-02-28 DIAGNOSIS — Z3A.21 21 WEEKS GESTATION OF PREGNANCY: ICD-10-CM

## 2024-02-28 LAB
ALBUMIN SERPL BCG-MCNC: 3.7 G/DL (ref 3.5–5.2)
ALBUMIN UR-MCNC: NEGATIVE MG/DL
ALP SERPL-CCNC: 84 U/L (ref 40–150)
ALT SERPL W P-5'-P-CCNC: 14 U/L (ref 0–50)
ANION GAP SERPL CALCULATED.3IONS-SCNC: 9 MMOL/L (ref 7–15)
APPEARANCE UR: ABNORMAL
AST SERPL W P-5'-P-CCNC: 17 U/L (ref 0–45)
BACTERIA #/AREA URNS HPF: ABNORMAL /HPF
BASOPHILS # BLD AUTO: 0 10E3/UL (ref 0–0.2)
BASOPHILS NFR BLD AUTO: 0 %
BILIRUB SERPL-MCNC: 0.3 MG/DL
BILIRUB UR QL STRIP: NEGATIVE
BUN SERPL-MCNC: 5.5 MG/DL (ref 6–20)
CALCIUM SERPL-MCNC: 9.3 MG/DL (ref 8.6–10)
CHLORIDE SERPL-SCNC: 100 MMOL/L (ref 98–107)
COLOR UR AUTO: ABNORMAL
CREAT SERPL-MCNC: 0.44 MG/DL (ref 0.51–0.95)
DEPRECATED HCO3 PLAS-SCNC: 25 MMOL/L (ref 22–29)
EGFRCR SERPLBLD CKD-EPI 2021: >90 ML/MIN/1.73M2
EOSINOPHIL # BLD AUTO: 0.4 10E3/UL (ref 0–0.7)
EOSINOPHIL NFR BLD AUTO: 4 %
ERYTHROCYTE [DISTWIDTH] IN BLOOD BY AUTOMATED COUNT: 14.4 % (ref 10–15)
GLUCOSE SERPL-MCNC: 82 MG/DL (ref 70–99)
GLUCOSE UR STRIP-MCNC: NEGATIVE MG/DL
HCT VFR BLD AUTO: 36.7 % (ref 35–47)
HGB BLD-MCNC: 11.9 G/DL (ref 11.7–15.7)
HGB UR QL STRIP: NEGATIVE
IMM GRANULOCYTES # BLD: 0.1 10E3/UL
IMM GRANULOCYTES NFR BLD: 1 %
KETONES UR STRIP-MCNC: 10 MG/DL
LEUKOCYTE ESTERASE UR QL STRIP: NEGATIVE
LYMPHOCYTES # BLD AUTO: 3.3 10E3/UL (ref 0.8–5.3)
LYMPHOCYTES NFR BLD AUTO: 27 %
MAGNESIUM SERPL-MCNC: 1.8 MG/DL (ref 1.7–2.3)
MCH RBC QN AUTO: 30.1 PG (ref 26.5–33)
MCHC RBC AUTO-ENTMCNC: 32.4 G/DL (ref 31.5–36.5)
MCV RBC AUTO: 93 FL (ref 78–100)
MONOCYTES # BLD AUTO: 0.6 10E3/UL (ref 0–1.3)
MONOCYTES NFR BLD AUTO: 5 %
MUCOUS THREADS #/AREA URNS LPF: PRESENT /LPF
NEUTROPHILS # BLD AUTO: 7.7 10E3/UL (ref 1.6–8.3)
NEUTROPHILS NFR BLD AUTO: 63 %
NITRATE UR QL: NEGATIVE
NRBC # BLD AUTO: 0 10E3/UL
NRBC BLD AUTO-RTO: 0 /100
PH UR STRIP: 5.5 [PH] (ref 5–7)
PLATELET # BLD AUTO: 371 10E3/UL (ref 150–450)
POTASSIUM SERPL-SCNC: 3.6 MMOL/L (ref 3.4–5.3)
PROT SERPL-MCNC: 7.5 G/DL (ref 6.4–8.3)
RBC # BLD AUTO: 3.96 10E6/UL (ref 3.8–5.2)
RBC URINE: 1 /HPF
SODIUM SERPL-SCNC: 134 MMOL/L (ref 135–145)
SP GR UR STRIP: 1.02 (ref 1–1.03)
SQUAMOUS EPITHELIAL: 7 /HPF
TRANSITIONAL EPI: <1 /HPF
TSH SERPL DL<=0.005 MIU/L-ACNC: 1.79 UIU/ML (ref 0.3–4.2)
UROBILINOGEN UR STRIP-MCNC: NORMAL MG/DL
WBC # BLD AUTO: 12.1 10E3/UL (ref 4–11)
WBC URINE: 3 /HPF

## 2024-02-28 PROCEDURE — 93010 ELECTROCARDIOGRAM REPORT: CPT | Mod: 59

## 2024-02-28 PROCEDURE — 93005 ELECTROCARDIOGRAM TRACING: CPT | Performed by: FAMILY MEDICINE

## 2024-02-28 PROCEDURE — 250N000009 HC RX 250: Performed by: FAMILY MEDICINE

## 2024-02-28 PROCEDURE — 81001 URINALYSIS AUTO W/SCOPE: CPT

## 2024-02-28 PROCEDURE — 84443 ASSAY THYROID STIM HORMONE: CPT

## 2024-02-28 PROCEDURE — 76815 OB US LIMITED FETUS(S): CPT | Mod: 26

## 2024-02-28 PROCEDURE — 70496 CT ANGIOGRAPHY HEAD: CPT

## 2024-02-28 PROCEDURE — 99285 EMERGENCY DEPT VISIT HI MDM: CPT | Mod: FS | Performed by: FAMILY MEDICINE

## 2024-02-28 PROCEDURE — 85390 FIBRINOLYSINS SCREEN I&R: CPT | Mod: 26 | Performed by: PATHOLOGY

## 2024-02-28 PROCEDURE — 36415 COLL VENOUS BLD VENIPUNCTURE: CPT

## 2024-02-28 PROCEDURE — 258N000003 HC RX IP 258 OP 636

## 2024-02-28 PROCEDURE — 86147 CARDIOLIPIN ANTIBODY EA IG: CPT

## 2024-02-28 PROCEDURE — 85025 COMPLETE CBC W/AUTO DIFF WBC: CPT

## 2024-02-28 PROCEDURE — 250N000013 HC RX MED GY IP 250 OP 250 PS 637

## 2024-02-28 PROCEDURE — 86146 BETA-2 GLYCOPROTEIN ANTIBODY: CPT

## 2024-02-28 PROCEDURE — 96361 HYDRATE IV INFUSION ADD-ON: CPT | Performed by: FAMILY MEDICINE

## 2024-02-28 PROCEDURE — 85613 RUSSELL VIPER VENOM DILUTED: CPT

## 2024-02-28 PROCEDURE — 76815 OB US LIMITED FETUS(S): CPT | Performed by: FAMILY MEDICINE

## 2024-02-28 PROCEDURE — 70450 CT HEAD/BRAIN W/O DYE: CPT

## 2024-02-28 PROCEDURE — 250N000011 HC RX IP 250 OP 636: Performed by: FAMILY MEDICINE

## 2024-02-28 PROCEDURE — 83735 ASSAY OF MAGNESIUM: CPT

## 2024-02-28 PROCEDURE — 80053 COMPREHEN METABOLIC PANEL: CPT

## 2024-02-28 PROCEDURE — 96375 TX/PRO/DX INJ NEW DRUG ADDON: CPT | Mod: 59 | Performed by: FAMILY MEDICINE

## 2024-02-28 PROCEDURE — 99285 EMERGENCY DEPT VISIT HI MDM: CPT | Mod: 25 | Performed by: FAMILY MEDICINE

## 2024-02-28 PROCEDURE — 96374 THER/PROPH/DIAG INJ IV PUSH: CPT | Mod: 59 | Performed by: FAMILY MEDICINE

## 2024-02-28 PROCEDURE — 250N000011 HC RX IP 250 OP 636: Mod: JZ

## 2024-02-28 RX ORDER — ACETAMINOPHEN 500 MG
1000 TABLET ORAL ONCE
Status: COMPLETED | OUTPATIENT
Start: 2024-02-28 | End: 2024-02-28

## 2024-02-28 RX ORDER — DIPHENHYDRAMINE HYDROCHLORIDE 50 MG/ML
25 INJECTION INTRAMUSCULAR; INTRAVENOUS ONCE
Status: COMPLETED | OUTPATIENT
Start: 2024-02-28 | End: 2024-02-28

## 2024-02-28 RX ORDER — METOCLOPRAMIDE 5 MG/1
5 TABLET ORAL EVERY 6 HOURS PRN
Qty: 12 TABLET | Refills: 0 | Status: SHIPPED | OUTPATIENT
Start: 2024-02-28

## 2024-02-28 RX ORDER — METOCLOPRAMIDE HYDROCHLORIDE 5 MG/ML
5 INJECTION INTRAMUSCULAR; INTRAVENOUS ONCE
Status: COMPLETED | OUTPATIENT
Start: 2024-02-28 | End: 2024-02-28

## 2024-02-28 RX ORDER — IOPAMIDOL 755 MG/ML
100 INJECTION, SOLUTION INTRAVASCULAR ONCE
Status: COMPLETED | OUTPATIENT
Start: 2024-02-28 | End: 2024-02-28

## 2024-02-28 RX ADMIN — DIPHENHYDRAMINE HYDROCHLORIDE 25 MG: 50 INJECTION, SOLUTION INTRAMUSCULAR; INTRAVENOUS at 17:01

## 2024-02-28 RX ADMIN — IOPAMIDOL 67 ML: 755 INJECTION, SOLUTION INTRAVENOUS at 17:56

## 2024-02-28 RX ADMIN — ACETAMINOPHEN 1000 MG: 500 TABLET ORAL at 17:06

## 2024-02-28 RX ADMIN — SODIUM CHLORIDE 80 ML: 9 INJECTION, SOLUTION INTRAVENOUS at 17:56

## 2024-02-28 RX ADMIN — METOCLOPRAMIDE HYDROCHLORIDE 5 MG: 5 INJECTION INTRAMUSCULAR; INTRAVENOUS at 16:59

## 2024-02-28 RX ADMIN — SODIUM CHLORIDE 1000 ML: 9 INJECTION, SOLUTION INTRAVENOUS at 16:58

## 2024-02-28 ASSESSMENT — COLUMBIA-SUICIDE SEVERITY RATING SCALE - C-SSRS
6. HAVE YOU EVER DONE ANYTHING, STARTED TO DO ANYTHING, OR PREPARED TO DO ANYTHING TO END YOUR LIFE?: NO
1. IN THE PAST MONTH, HAVE YOU WISHED YOU WERE DEAD OR WISHED YOU COULD GO TO SLEEP AND NOT WAKE UP?: NO
2. HAVE YOU ACTUALLY HAD ANY THOUGHTS OF KILLING YOURSELF IN THE PAST MONTH?: NO

## 2024-02-28 ASSESSMENT — ACTIVITIES OF DAILY LIVING (ADL)
ADLS_ACUITY_SCORE: 35

## 2024-02-28 NOTE — ED PROVIDER NOTES
[facility] EMERGENCY DEPARTMENT ([place] [facility])  24  ED 06      History     Chief Complaint   Patient presents with    Visual Disturbance     The history is provided by the patient. No  was used.     Minda Silverman is a 28 year old  female (23w 5d pregnant) with a past medical history significant for obesity, prediabetes and tobacco use disorder who presents to the Emergency Department for evaluation of headache and vision changes.  She states that she has had 2 episodes of headache with some vision changes and vision loss over the past 24 hours.  Initial episode occurred last night.  She describes it as a sudden feeling of lightheadedness, vision loss bilaterally for a brief second and then return of her vision with stars and spots noted in onset of a frontal and bilateral temporal headache.  She denies any complete loss of consciousness or syncope.  She states that headache continued throughout the evening but resolved prior to bed without any therapies tried at home.  She states that she had a similar episode while at work later this morning or early this afternoon with similar presentation symptoms.  In addition to her vision and headache symptoms, during her brief onset of headache episodes, she does report some abdominal tension that is brief and does not continue.  She presents to the ED due to concern with this new type of headache that she has not had in the past.  She currently has a headache while in the ED.  She denies any current vision changes or loss currently in the ED.  She is wearing glasses and states her vision is at her baseline currently.  She still locates her headache to her frontal head and bilateral temporal regions without any radiation to her posterior head or down her neck.  She reports some nausea that she has had throughout her entire pregnancy but denies any episodes of recent vomiting over the past 24 hours.  She denies any recent  illnesses.  She denies known fever or chills recently.  She otherwise continues to deny chest pain, shortness of air, abdominal pain or cramping, dysuria, hematuria, urgency or frequency of urination, vaginal bleeding, or vaginal discharge.  She is oriented x 3 and able to answer questions appropriately and at her baseline.  She denies any difficulties walking or balance issues.    She follows with [facility] for history of previous spontaneous miscarriages.  She did have a cerclage placed in January 2024 due to thinning of her cervix that has occurred in the past.    Past Medical History  Past Medical History:   Diagnosis Date    NO ACTIVE PROBLEMS      Past Surgical History:   Procedure Laterality Date    CERCLAGE CERVICAL N/A 1/9/2024    Procedure: CERCLAGE, CERVIX, VAGINAL APPROACH;  Surgeon: Starr Watts MD;  Location: [facility] L+D    DILATION AND CURETTAGE SUCTION N/A 5/7/2022    Procedure: DILATION AND CURETTAGE, UTERUS, USING SUCTION;  Surgeon: Vika Hopper MD;  Location: [facility] L[facility]     metoclopramide (REGLAN) 5 MG tablet  docusate sodium (COLACE) 100 MG capsule  ferrous sulfate (FE TABS) 325 (65 Fe) MG EC tablet  metoclopramide (REGLAN) 10 MG tablet  Prenatal Vit-Fe Fumarate-FA (PRENATAL VITAMINS) 28-0.8 MG TABS  VITAMIN  B-6 25 MG tablet      No Known Allergies  Family History  No family history on file.  Social History   Social History     Tobacco Use    Smoking status: Former     Years: .2     Types: Cigarettes    Smokeless tobacco: Never   Substance Use Topics    Alcohol use: No    Drug use: Never      Past medical history, past surgical history, medications, allergies, family history, and social history were reviewed with the patient. No additional pertinent items.      A medically appropriate review of systems was performed with pertinent positives and negatives noted in the HPI, and all other systems negative.    Physical Exam   BP: 111/68  Pulse: 74  Temp: 98.4  F (36.9  C)  Resp: 18  Weight: 145  kg (319 lb 11.2 oz)  SpO2: 100 %  Physical Exam  Constitutional:       General: She is not in acute distress.     Appearance: Normal appearance. She is obese. She is not ill-appearing, toxic-appearing or diaphoretic.   HENT:      Head: Normocephalic and atraumatic.      Nose: Nose normal.      Mouth/Throat:      Mouth: Mucous membranes are moist.      Pharynx: Oropharynx is clear.   Eyes:      General:         Right eye: No discharge.         Left eye: No discharge.      Extraocular Movements: Extraocular movements intact.      Pupils: Pupils are equal, round, and reactive to light.   Cardiovascular:      Rate and Rhythm: Normal rate and regular rhythm.      Pulses: Normal pulses.   Pulmonary:      Effort: Pulmonary effort is normal. No respiratory distress.      Breath sounds: Normal breath sounds.   Abdominal:      General: Abdomen is flat. Bowel sounds are normal.      Palpations: Abdomen is soft.      Tenderness: There is no abdominal tenderness. There is no guarding or rebound.   Musculoskeletal:         General: Normal range of motion.      Right lower leg: No edema.      Left lower leg: No edema.   Skin:     General: Skin is warm and dry.      Capillary Refill: Capillary refill takes less than 2 seconds.   Neurological:      General: No focal deficit present.      Mental Status: She is alert and oriented to person, place, and time. Mental status is at baseline.      Cranial Nerves: No cranial nerve deficit.      Sensory: No sensory deficit.      Motor: No weakness.   Psychiatric:         Mood and Affect: Mood normal.         Behavior: Behavior normal.           ED Course, Procedures, & Data     ED Course as of 02/28/24 2315   Wed Feb 28, 2024   1615 Discussed with OB/GYN.  They they voiced concerns with possible urgent or emergent etiology of her head and brain that could be causing her symptoms.  They stated that if I would get a scan or image of some sort if she was not pregnant that at this time we should  pursue imaging for this patient despite her pregnancy.  Discussed that I would contact neurology for recommendations of specific types of imaging that would be recommended based on patient's symptomology.   1625 Neurology paged   1635 Discussed patient case with neurology who was in agreement with obtaining some sort of imaging due to patient's presenting history of symptoms and continued headache.  She recommended imaging with contrast of some sort in the venous phase to rule out some sort of sinus thrombosis.  Initial option discussed was MRI with gadolinium but stated that gadolinium is often a poor choice for patients who are pregnant due to toxicity.  Discussed CTV as an alternative for which neurologist reported radiation risk with safe for iodine contrast.  Weighed risks of gadolinium contrast versus radiation and decided that due to patient being in her almost her third trimester that small amount of possible radiation exposure from CT of her head versus toxicity of gadolinium is preferred from ED provider standpoint.  Neurology was agreeable to this plan.   1645 Discussed neurology recommendations with OB/GYN who were ultimately agreeable as well to CT head without contrast as well as CTV for further evaluation of patient's headache and vision change symptoms.   1903 Neurology paged    Discussed CT head without contrast and CT V imaging results with on-call neurologist.  They question whether finding was more congenital and due to the cut of the film over other acute findings.  The ultimately stated they did not believe that this was the cause of patient's acute symptoms but recommended discussing with stroke neuro for further confirmation from their service.   1914 Paged neuro stroke    Discussed recommendations from neuro to contact their service for CTV head findings for this patient.  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  Stated that if the patient is having improvement of her symptoms (she has) it would be reasonable to have her discharged home with continued medication management and monitoring of her symptoms outpatient.  He states that she does not require regular neurology or neurosurgery follow-up.   1926 Ketones Urine(!): 10   1928 Neuro stroke fellow paged   2001 OB/GYN paged    Closed the loop on patient's improved symptoms as well as her CT findings.  They were agreeable to discharge and had no other recommendations from their service.  They recommended noting in my note today the need for evaluation from anesthesia for clearance of patient being able to actively push during labor.  Will make these comments and MDM portion for MFHAILEY to note at her next follow-up.     Procedures  Limited Bedside Transabdominal ultrasound for evaluation of IUP        Performed any interpreted by me.    Indication: Confirmation of fetal heart rate  Findings:  The lower abdomen was interrogated with a curvilinear probe. The uterus was identified.   Within the uterus there is a fetus and a moving fetus with visible heart rate with FHR    Impression: Intrauterine pregnancy         EKG Interpretation:      Interpreted by Margi Hoover PA-C  Time reviewed: 1630  Symptoms at time of EKG: headache   Rhythm: normal sinus   Rate: Normal  Axis: Normal  Ectopy: none  Conduction: normal  ST Segments/ T Waves: No ST-T wave changes and No acute ischemic changes  Q Waves: none  Comparison to prior: No old EKG available    Clinical Impression: normal EKG         Results for orders placed or performed during the hospital encounter of 02/28/24   CT Head w/o Contrast     Status: None    Narrative    EXAM: CTV HEAD WITH CONTRAST, CT HEAD W/O CONTRAST  LOCATION: [facility]  DATE: 2/28/2024    INDICATION: New headache; vision loss; 22 weeks gestation  COMPARISON:  None.  CONTRAST: 67mL Isovue 370  TECHNIQUE: Head CT and CT venogram with IV contrast. Noncontrast head CT followed by axial helical CT images of the intracranial vessels obtained during the venous phase of intravenous contrast administration. Axial helical 2D reconstructed images and   multiplanar 3D MIP reconstructed images of the intracranial vessels were performed by the technologist. Dose reduction techniques were used.     FINDINGS:   NONCONTRAST HEAD CT:   INTRACRANIAL CONTENTS: No intracranial hemorrhage, extraaxial collection, or mass effect.  No CT evidence of acute infarct. Normal parenchymal attenuation. Normal ventricles and sulci. Cerebellar hemispheres, fourth ventricle and region of CP angle   cisterns are clear. Nasopharynx is clear. Region of the sella and suprasellar cistern is normal with prominent pituitary gland which is normal in pregnancy state.    VISUALIZED ORBITS/SINUSES/MASTOIDS: No intraorbital abnormality. Mild to moderate mucosal thickening scattered about the paranasal sinuses. No middle ear or mastoid effusion.    BONES/SOFT TISSUES: No acute abnormality.    HEAD CTV:  DURAL SINUSES: No evidence of dural venous sinus thrombosis. The right transverse dural venous sinus is small in caliber with areas of nonvisualization of the lateral portion of the right dural venous transverse sinus prior to its junction with the   sigmoid sinus. Balanced transverse and sigmoid sinuses.    INTERNAL CEREBRAL VEINS: No significant stenosis or occlusion.      MAJOR CORTICAL VENOUS BRANCHES: No significant stenosis or occlusion.      Impression    IMPRESSION:   HEAD CT:  1.  Normal head CT.    HEAD CTV:   1.  No evidence of intracranial venous sinus thrombosis.    2.  The right transverse dural venous sinus is small in caliber with areas of nonvisualization of the lateral portion of the right transverse sinus prior to its junction with the sigmoid sinus. This finding can be a result of congenital  hypoplasia vs   subtotal occlusion of the transverse sinus (either from prior remote thrombosis or idiopathic intracranial hypertension ).   CTV Head with Contrast     Status: None    Narrative    EXAM: CTV HEAD WITH CONTRAST, CT HEAD W/O CONTRAST  LOCATION: [facility]  DATE: 2/28/2024    INDICATION: New headache; vision loss; 22 weeks gestation  COMPARISON: None.  CONTRAST: 67mL Isovue 370  TECHNIQUE: Head CT and CT venogram with IV contrast. Noncontrast head CT followed by axial helical CT images of the intracranial vessels obtained during the venous phase of intravenous contrast administration. Axial helical 2D reconstructed images and   multiplanar 3D MIP reconstructed images of the intracranial vessels were performed by the technologist. Dose reduction techniques were used.     FINDINGS:   NONCONTRAST HEAD CT:   INTRACRANIAL CONTENTS: No intracranial hemorrhage, extraaxial collection, or mass effect.  No CT evidence of acute infarct. Normal parenchymal attenuation. Normal ventricles and sulci. Cerebellar hemispheres, fourth ventricle and region of CP angle   cisterns are clear. Nasopharynx is clear. Region of the sella and suprasellar cistern is normal with prominent pituitary gland which is normal in pregnancy state.    VISUALIZED ORBITS/SINUSES/MASTOIDS: No intraorbital abnormality. Mild to moderate mucosal thickening scattered about the paranasal sinuses. No middle ear or mastoid effusion.    BONES/SOFT TISSUES: No acute abnormality.    HEAD CTV:  DURAL SINUSES: No evidence of dural venous sinus thrombosis. The right transverse dural venous sinus is small in caliber with areas of nonvisualization of the lateral portion of the right dural venous transverse sinus prior to its junction with the   sigmoid sinus. Balanced transverse and sigmoid sinuses.    INTERNAL CEREBRAL VEINS: No significant stenosis or occlusion.      MAJOR CORTICAL VENOUS BRANCHES: No  significant stenosis or occlusion.      Impression    IMPRESSION:   HEAD CT:  1.  Normal head CT.    HEAD CTV:   1.  No evidence of intracranial venous sinus thrombosis.    2.  The right transverse dural venous sinus is small in caliber with areas of nonvisualization of the lateral portion of the right transverse sinus prior to its junction with the sigmoid sinus. This finding can be a result of congenital hypoplasia vs   subtotal occlusion of the transverse sinus (either from prior remote thrombosis or idiopathic intracranial hypertension ).   Comprehensive metabolic panel     Status: Abnormal   Result Value Ref Range    Sodium 134 (L) 135 - 145 mmol/L    Potassium 3.6 3.4 - 5.3 mmol/L    Carbon Dioxide (CO2) 25 22 - 29 mmol/L    Anion Gap 9 7 - 15 mmol/L    Urea Nitrogen 5.5 (L) 6.0 - 20.0 mg/dL    Creatinine 0.44 (L) 0.51 - 0.95 mg/dL    GFR Estimate >90 >60 mL/min/1.73m2    Calcium 9.3 8.6 - 10.0 mg/dL    Chloride 100 98 - 107 mmol/L    Glucose 82 70 - 99 mg/dL    Alkaline Phosphatase 84 40 - 150 U/L    AST 17 0 - 45 U/L    ALT 14 0 - 50 U/L    Protein Total 7.5 6.4 - 8.3 g/dL    Albumin 3.7 3.5 - 5.2 g/dL    Bilirubin Total 0.3 <=1.2 mg/dL   Magnesium     Status: Normal   Result Value Ref Range    Magnesium 1.8 1.7 - 2.3 mg/dL   UA with Microscopic reflex to Culture     Status: Abnormal    Specimen: Urine, Clean Catch   Result Value Ref Range    Color Urine Light Yellow Colorless, Straw, Light Yellow, Yellow    Appearance Urine Slightly Cloudy (A) Clear    Glucose Urine Negative Negative mg/dL    Bilirubin Urine Negative Negative    Ketones Urine 10 (A) Negative mg/dL    Specific Gravity Urine 1.016 1.003 - 1.035    Blood Urine Negative Negative    pH Urine 5.5 5.0 - 7.0    Protein Albumin Urine Negative Negative mg/dL    Urobilinogen Urine Normal Normal, 2.0 mg/dL    Nitrite Urine Negative Negative    Leukocyte Esterase Urine Negative Negative    Bacteria Urine Few (A) None Seen /HPF    Mucus Urine Present (A)  None Seen /LPF    RBC Urine 1 <=2 /HPF    WBC Urine 3 <=5 /HPF    Squamous Epithelials Urine 7 (H) <=1 /HPF    Transitional Epithelials Urine <1 <=1 /HPF    Narrative    Urine Culture not indicated   TSH with free T4 reflex     Status: Normal   Result Value Ref Range    TSH 1.79 0.30 - 4.20 uIU/mL   CBC with platelets and differential     Status: Abnormal   Result Value Ref Range    WBC Count 12.1 (H) 4.0 - 11.0 10e3/uL    RBC Count 3.96 3.80 - 5.20 10e6/uL    Hemoglobin 11.9 11.7 - 15.7 g/dL    Hematocrit 36.7 35.0 - 47.0 %    MCV 93 78 - 100 fL    MCH 30.1 26.5 - 33.0 pg    MCHC 32.4 31.5 - 36.5 g/dL    RDW 14.4 10.0 - 15.0 %    Platelet Count 371 150 - 450 10e3/uL    % Neutrophils 63 %    % Lymphocytes 27 %    % Monocytes 5 %    % Eosinophils 4 %    % Basophils 0 %    % Immature Granulocytes 1 %    NRBCs per 100 WBC 0 <1 /100    Absolute Neutrophils 7.7 1.6 - 8.3 10e3/uL    Absolute Lymphocytes 3.3 0.8 - 5.3 10e3/uL    Absolute Monocytes 0.6 0.0 - 1.3 10e3/uL    Absolute Eosinophils 0.4 0.0 - 0.7 10e3/uL    Absolute Basophils 0.0 0.0 - 0.2 10e3/uL    Absolute Immature Granulocytes 0.1 <=0.4 10e3/uL    Absolute NRBCs 0.0 10e3/uL   EKG 12-lead, tracing only     Status: None (Preliminary result)   Result Value Ref Range    Systolic Blood Pressure  mmHg    Diastolic Blood Pressure  mmHg    Ventricular Rate 62 BPM    Atrial Rate 62 BPM    WI Interval 146 ms    QRS Duration 106 ms     ms    QTc 440 ms    P Axis -15 degrees    R AXIS 32 degrees    T Axis 23 degrees    Interpretation ECG Sinus rhythm with sinus arrhythmia  Normal ECG      CBC with platelets differential     Status: Abnormal    Narrative    The following orders were created for panel order CBC with platelets differential.  Procedure                               Abnormality         Status                     ---------                               -----------         ------                     CBC with platelets and d...[[id number]]  Abnormal             Final result                 Please view results for these tests on the individual orders.     Medications   sodium chloride 0.9% BOLUS 1,000 mL (0 mLs Intravenous Stopped 2/28/24 1908)   acetaminophen (TYLENOL) tablet 1,000 mg (1,000 mg Oral $Given 2/28/24 1706)   metoclopramide (REGLAN) injection 5 mg (5 mg Intravenous $Given 2/28/24 1659)   diphenhydrAMINE (BENADRYL) injection 25 mg (25 mg Intravenous $Given 2/28/24 1701)   iopamidol (ISOVUE-370) solution 100 mL (67 mLs Intravenous $Given 2/28/24 1756)   sodium chloride 0.9 % bag 100mL (80 mLs Intravenous $Given 2/28/24 1756)     Labs Ordered and Resulted from Time of ED Arrival to Time of ED Departure   COMPREHENSIVE METABOLIC PANEL - Abnormal       Result Value    Sodium 134 (*)     Potassium 3.6      Carbon Dioxide (CO2) 25      Anion Gap 9      Urea Nitrogen 5.5 (*)     Creatinine 0.44 (*)     GFR Estimate >90      Calcium 9.3      Chloride 100      Glucose 82      Alkaline Phosphatase 84      AST 17      ALT 14      Protein Total 7.5      Albumin 3.7      Bilirubin Total 0.3     ROUTINE UA WITH MICROSCOPIC REFLEX TO CULTURE - Abnormal    Color Urine Light Yellow      Appearance Urine Slightly Cloudy (*)     Glucose Urine Negative      Bilirubin Urine Negative      Ketones Urine 10 (*)     Specific Gravity Urine 1.016      Blood Urine Negative      pH Urine 5.5      Protein Albumin Urine Negative      Urobilinogen Urine Normal      Nitrite Urine Negative      Leukocyte Esterase Urine Negative      Bacteria Urine Few (*)     Mucus Urine Present (*)     RBC Urine 1      WBC Urine 3      Squamous Epithelials Urine 7 (*)     Transitional Epithelials Urine <1     CBC WITH PLATELETS AND DIFFERENTIAL - Abnormal    WBC Count 12.1 (*)     RBC Count 3.96      Hemoglobin 11.9      Hematocrit 36.7      MCV 93      MCH 30.1      MCHC 32.4      RDW 14.4      Platelet Count 371      % Neutrophils 63      % Lymphocytes 27      % Monocytes 5      % Eosinophils 4      %  Basophils 0      % Immature Granulocytes 1      NRBCs per 100 WBC 0      Absolute Neutrophils 7.7      Absolute Lymphocytes 3.3      Absolute Monocytes 0.6      Absolute Eosinophils 0.4      Absolute Basophils 0.0      Absolute Immature Granulocytes 0.1      Absolute NRBCs 0.0     MAGNESIUM - Normal    Magnesium 1.8     TSH WITH FREE T4 REFLEX - Normal    TSH 1.79     LUPUS ANTICOAGULANT PANEL   BETA 2 GLYCOPROTEIN 1 ANTIBODY IGG   CARDIOLIPIN KENYON IGG AND IGM     CTV Head with Contrast   Final Result   IMPRESSION:    HEAD CT:   1.  Normal head CT.      HEAD CTV:    1.  No evidence of intracranial venous sinus thrombosis.      2.  The right transverse dural venous sinus is small in caliber with areas of nonvisualization of the lateral portion of the right transverse sinus prior to its junction with the sigmoid sinus. This finding can be a result of congenital hypoplasia vs    subtotal occlusion of the transverse sinus (either from prior remote thrombosis or idiopathic intracranial hypertension ).      CT Head w/o Contrast   Final Result   IMPRESSION:    HEAD CT:   1.  Normal head CT.      HEAD CTV:    1.  No evidence of intracranial venous sinus thrombosis.      2.  The right transverse dural venous sinus is small in caliber with areas of nonvisualization of the lateral portion of the right transverse sinus prior to its junction with the sigmoid sinus. This finding can be a result of congenital hypoplasia vs    subtotal occlusion of the transverse sinus (either from prior remote thrombosis or idiopathic intracranial hypertension ).      POC US OB TRANSABDOMINAL LIMITED    (Results Pending)          Critical care was not performed.     Medical Decision Making  The patient's presentation was of high complexity (an acute health issue posing potential threat to life or bodily function).    The patient's evaluation involved:  review of external note(s) from 1 sources ([facility] outpatient encounters)  review of 1 test result(s)  ordered prior to this encounter (transabdominal ultrasound results during this pregnancy)  ordering and/or review of 3+ test(s) in this encounter (see separate area of note for details)  discussion of management or test interpretation with another health professional (neurology, stroke neurology, OB/GYN)    The patient's management necessitated moderate risk (prescription drug management including medications given in the ED).    Assessment & Plan    Minda is a 28-year-old female that presented to the ED with complaints of new onset/change in headache.  Acceptable vital signs on presentation without any hypertension, tachycardia, hypoxia, or fever.  Patient in no acute distress and nontoxic-appearing on initial exam and throughout the ED visit.  No gross neurodeficits on exam.  Cranial nerves appear intact on exam.  Patient oriented x 3 and mentating at her baseline, able to answer questions appropriately.  UA positive for ketones but negative for signs of infection.  WBC minimally elevated at 12.1.  Labs otherwise unremarkable and within normal limits.  Discussed patient case with OB/GYN who is agreeable to head imaging of some sort to rule out any acute intracranial abnormalities or etiologies of the patient's symptoms.  Discussed patient case with neurology for recommendations on what type of imaging will be most beneficial based on patient's symptoms and presentation.  CT head without contrast and CTV head with contrast obtained.  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  Discussed these findings with neurology who evaluated the  images and stated they did not feel that patient symptoms were stemming from this most likely congenital malformation but recommended confirmation or recommendations from stroke neurology.  Discussed with on-call stroke neurology who evaluated the images themselves and was agreeable that it appeared more congenital in nature and that there were no other recommendations from their standpoint.  Stated that patient does not need to follow-up outpatient regularly for this finding.  Reassessed patient who reported improvement of her headache pain from a 6 out of 10 on presentation down to a 2 out of 10.  Patient continued to have appropriate vision and neuroexam.  Discussed all lab and imaging results as well as recommendations from OB/GYN, neurology, and stroke neurology.  Discussed that if patient feels comfortable at this time discharge with strict return precautions would be recommended.  Patient voiced understanding and comfort with discharging home with reassuring lab and imaging results as well as improvement of her headache symptoms.  Patient stable for discharge home.  Strict return precautions given.  Patient is scheduled to follow-up with [facility] on 4/12/2024 and OB/GYN states that this is an appropriate follow-up from their perspective.  Advise continuing Tylenol for recurrent headache.  Rx Reglan given for nausea, vomiting, and headache.  Advise continuing plenty of fluids and diet as tolerated.  Patient was agreeable to the discharge treatment plan, voiced understanding of the plan and all lab and imaging results, and all questions answered prior to discharge.    OB/GYN recommended evaluation from anesthesia through [facility] to discuss safety and patient's ability to push during active labor with new CT head image findings.    I have reviewed the nursing notes. I have reviewed the findings, diagnosis, plan and need for follow up with the patient.    Discharge Medication List as of 2/28/2024  8:42 PM        START  taking these medications    Details   !! metoclopramide (REGLAN) 5 MG tablet Take 1 tablet (5 mg) by mouth every 6 hours as needed (Headache, nausea), Disp-12 tablet, R-0, E-Prescribe       !! - Potential duplicate medications found. Please discuss with provider.          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 I have performed a history and physical examination of the patient independent of the ALLYSON. I reviewed the ALLYSON's documentation above and agree with the documented findings and plan of care. I personally provided a substantive portion of the care for this patient, including the complete Medical Decision Making. Please see the ALLYSON's documentation for full details.    Summary of HPI, PE, ED Course   Patient is a 28 year old female evaluated in the emergency department for headache with transient vision changes currently pregnant. Exam and ED course notable for neuro nonfocal, no sign of preeclampsia, consult with MFM and neuro with CTV brain showing no obvious thrombosis, but ? Congential vein findings reviewed with stroke fellow, felt more congenital and not clot, treat for migraine improved, MFM OK home also and neuro with f/u. After the completion of care in the emergency department, the patient was discharged.            Fernandez Mena MD  Emergency Medicine     This note was created at least in part by the use of dragon voice dictation system. Inadvertent typographical errors may still exist.  Fernandez Mena MD.  Patient evaluated in the emergency department during the COVID-19 pandemic  period. Careful attention to patients safety was addressed throughout the evaluation. Evaluation and treatment management was initiated with disposition made efficiently and appropriate as possible to minimize any risk of potential exposure to patient during this evaluation.           Fernandez Mena MD  02/28/24 3594

## 2024-02-28 NOTE — CONSULTS
Maternal-Fetal Medicine Consultation    Barry Silverman  : 1995  MRN: [id number]    HPI:  Barry Silverman is a 28 year old  at 21w2d by 6w5d who presents to the ED after new headaches and acute loss of vision.    Patient states yesterday she had an acute onset severe headache with acute vision loss, bilaterally. This lasted for less than a minute. She saw stars in her peripheral vision after this episode. It resolved spontaneously and she did not come for care at that time. Today at work, a similar event happened with acute onset headache and complete vision loss. At time of my evaluation in the ED, her vision is restored, her headache is not severe. She does have ongoing numbness and tingling of her right hand. She denies any contractions, bleeding, loss of fluid. Patient was being transported to CT at time of my evaluation, remaining history is incomplete.     Pregnancy complicated by:  - Hx previable birth  - Hx PPROM with delivery at 29w3d  - BMI 58    Prenatal Care:  Patient transported to CT prior to asking about Primary OB care     Obstetrics History:  OB History    Para Term  AB Living   4 1 0 1 2 1   SAB IAB Ectopic Multiple Live Births   1 0 0 0 1      # Outcome Date GA Lbr Chris/2nd Weight Sex Delivery Anes PTL Lv   4 Current            3 AB 22 18w6d  0.218 kg (7.7 oz)     FD      Name: RICK SILVERMAN,PENDING BABY BARRY CHAPPELL      Apgar1: 0  Apgar5: 0   2  17 29w3d / 00:07 1.156 kg (2 lb 8.8 oz) F Vag-Spont EPI Y MARIA TERESA      Name: RICK,BABY GIRL      Apgar1: 6  Apgar5: 7   1 SAB  14w0d              Past Medical History:  Past Medical History:   Diagnosis Date    NO ACTIVE PROBLEMS        Past Surgical History:  Past Surgical History:   Procedure Laterality Date    CERCLAGE CERVICAL N/A 2024    Procedure: CERCLAGE, CERVIX, VAGINAL APPROACH;  Surgeon: Starr Watts MD;  Location: [facility] L[facility]    DILATION AND CURETTAGE SUCTION N/A 2022     Procedure: DILATION AND CURETTAGE, UTERUS, USING SUCTION;  Surgeon: Vika Hopper MD;  Location: [facility] L[facility]       Current Medications:  Prior to Admission medications    Medication Sig Last Dose Taking? Auth Provider Long Term End Date   docusate sodium (COLACE) 100 MG capsule Take 1 capsule by mouth twice a day as needed for constipation   Reported, Patient     ferrous sulfate (FE TABS) 325 (65 Fe) MG EC tablet Take 1 tablet (325 mg) by mouth daily   Claudia Samano MD     metoclopramide (REGLAN) 10 MG tablet Take 1 tablet (10 mg) by mouth 3 times daily as needed (headache and nausea vomiting)   Fernandez Mena MD     Prenatal Vit-Fe Fumarate-FA (PRENATAL VITAMINS) 28-0.8 MG TABS Take 1 tablet by mouth daily   Reported, Patient     VITAMIN  B-6 25 MG tablet TAKE ONE TABLET BY MOUTH THREE TIMES DAILY AS NEEDED FOR NAUSEA   Reported, Patient         Allergies:  Patient has no known allergies.    ROS:  10-point ROS negative except as in HPI     PHYSICAL EXAM:  Deferred, patient leaving for CT    ASSESSMENT/PLAN:  Minda Silverman is a 28 year old  at 21w2d by 6w5d who presents to the ED with acute onset neurologic symptoms of headache, transient complete loss of vision and ongoing right hand numbness and tingling.     #Possible VTE in pregnancy  - Recommend antiphospholipid workup in setting of possible VTE in pregnancy: B2 glycoprotein, anticardiolipin, lupus anticoagulant  - Blood pressure normal in ED and HELLP labs normal, very low concern for preeclampsia at this time  - Agree with CT imaging as indicated. Any workup or treatment for VTE that would be indicated in a non-pregnant patient should be completed for this patient. Anticoagulation including therapeutic in pregnancy would be safe with lovenox or heparin, would only avoid warfarin in pregnancy.   - Agree with neurology consult, appreciate care  - MFM to follow peripherally, please page [phone] and [phone]  [phone] for any obstetric concerns    #IUP  - ED provider BSUS with normal cardiac activity  - Cerclage in place, no bleeding or obstetric concerns at this time  - Would not need fetal monitoring at this gestational age, should have daily Doptones if she were to be admitted    Discussed with [facility] staff Dr. Dela Cruz.     Louie Florentino MD  Obstetrics, Gynecology, and Women's Health  PGY4  5:47 PM 02/28/2024

## 2024-02-28 NOTE — ED TRIAGE NOTES
"  multiple episodes of vision loss over last 24 hours, \"starts with headache, then vision goes dark , then see stars, i sit down for a minute and it goes away \" no problem currently     currently pregnant, belly has moments of tenderness feeling really solid while patient experiances hot flashes    Triage Assessment (Adult)       Row Name 02/28/24 1531          Triage Assessment    Airway WDL WDL        Respiratory WDL    Respiratory WDL WDL        Skin Circulation/Temperature WDL    Skin Circulation/Temperature WDL WDL        Cardiac WDL    Cardiac WDL WDL     Cardiac Rhythm NSR        Peripheral/Neurovascular WDL    Peripheral Neurovascular WDL WDL        Cognitive/Neuro/Behavioral WDL    Cognitive/Neuro/Behavioral WDL WDL                     "

## 2024-02-29 LAB
ATRIAL RATE - MUSE: 62 BPM
B2 GLYCOPROT1 IGG SERPL IA-ACNC: 1.5 U/ML
CARDIOLIPIN IGG SER IA-ACNC: <2 GPL-U/ML
CARDIOLIPIN IGG SER IA-ACNC: NEGATIVE
CARDIOLIPIN IGM SER IA-ACNC: <2 MPL-U/ML
CARDIOLIPIN IGM SER IA-ACNC: NEGATIVE
DIASTOLIC BLOOD PRESSURE - MUSE: NORMAL MMHG
DRVVT SCREEN RATIO: 0.74
INR PPP: 0.99 (ref 0.85–1.15)
INTERPRETATION ECG - MUSE: NORMAL
LA PPP-IMP: NEGATIVE
LUPUS INTERPRETATION: NORMAL
P AXIS - MUSE: -15 DEGREES
PR INTERVAL - MUSE: 146 MS
PTT RATIO: 1.09
QRS DURATION - MUSE: 106 MS
QT - MUSE: 434 MS
QTC - MUSE: 440 MS
R AXIS - MUSE: 32 DEGREES
SYSTOLIC BLOOD PRESSURE - MUSE: NORMAL MMHG
T AXIS - MUSE: 23 DEGREES
THROMBIN TIME: 16.5 SECONDS (ref 13–19)
VENTRICULAR RATE- MUSE: 62 BPM

## 2024-02-29 NOTE — DISCHARGE INSTRUCTIONS
Continue to utilize Tylenol for headache symptoms; may use Reglan as needed every 6 hours for symptoms of nausea, vomiting, or headache  Continue plenty of fluids and diet as tolerated as to keep yourself hydrated and nourished  Follow-up with MFM as scheduled or sooner as deemed necessary for any concerning signs or symptoms  You do not need to have further follow-up with neurology for your suspected congenital brain abnormality on today's scans  Otherwise do not hesitate to return to the ED if you have any worsening or concerning signs or symptoms

## 2024-03-12 ENCOUNTER — HOSPITAL ENCOUNTER (OUTPATIENT)
Dept: ULTRASOUND IMAGING | Facility: CLINIC | Age: 29
Discharge: HOME | End: 2024-03-12
Attending: STUDENT IN AN ORGANIZED HEALTH CARE EDUCATION/TRAINING PROGRAM
Payer: COMMERCIAL

## 2024-03-12 ENCOUNTER — OFFICE VISIT (OUTPATIENT)
Dept: MATERNAL FETAL MEDICINE | Facility: CLINIC | Age: 29
End: 2024-03-12
Attending: STUDENT IN AN ORGANIZED HEALTH CARE EDUCATION/TRAINING PROGRAM
Payer: COMMERCIAL

## 2024-03-12 DIAGNOSIS — Z36.2 ENCOUNTER FOR FOLLOW-UP ULTRASOUND OF FETAL ANATOMY: ICD-10-CM

## 2024-03-12 DIAGNOSIS — O99.210 OBESITY IN PREGNANCY: Primary | ICD-10-CM

## 2024-03-12 DIAGNOSIS — O99.210 OBESITY IN PREGNANCY: ICD-10-CM

## 2024-03-12 PROCEDURE — 76816 OB US FOLLOW-UP PER FETUS: CPT

## 2024-03-12 PROCEDURE — 76816 OB US FOLLOW-UP PER FETUS: CPT | Mod: 26 | Performed by: STUDENT IN AN ORGANIZED HEALTH CARE EDUCATION/TRAINING PROGRAM

## 2024-03-12 NOTE — PROGRESS NOTES
Please see the full imaging report from the [employer] program under the imaging tab.    Keysha Dela Cruz MD  Maternal Fetal Medicine

## 2024-03-12 NOTE — NURSING NOTE
"Patient reports positive fetal movement, denies pain, no contractions, leaking of fluid, or bleeding.  Patient denies headache, visual changes, nausea/vomiting, epigastric pain related to preeclampsia. Pt stated seen in ER a couple weeks ago for headache, stated BP normal and got \"started on nausea med for headache\".  Education provided to patient on ultrasound.  SBAR given to LEONARDO ALVAREZ, see their note in Epic.      "

## 2024-03-26 ENCOUNTER — HOSPITAL ENCOUNTER (OUTPATIENT)
Facility: CLINIC | Age: 29
Discharge: HOME | End: 2024-03-26
Attending: OBSTETRICS & GYNECOLOGY | Admitting: ADVANCED PRACTICE MIDWIFE
Payer: COMMERCIAL

## 2024-03-26 VITALS — DIASTOLIC BLOOD PRESSURE: 59 MMHG | RESPIRATION RATE: 18 BRPM | TEMPERATURE: 97.8 F | SYSTOLIC BLOOD PRESSURE: 129 MMHG

## 2024-03-26 LAB
ABO/RH(D): NORMAL
ALBUMIN UR-MCNC: NEGATIVE MG/DL
ANTIBODY SCREEN: NEGATIVE
APPEARANCE UR: ABNORMAL
APTT PPP: 31 SECONDS (ref 22–38)
BACTERIA #/AREA URNS HPF: ABNORMAL /HPF
BILIRUB UR QL STRIP: NEGATIVE
CLUE CELLS: NORMAL
COLOR UR AUTO: YELLOW
ERYTHROCYTE [DISTWIDTH] IN BLOOD BY AUTOMATED COUNT: 14.1 % (ref 10–15)
FETAL RBC % LFV: 0 %
FETAL RBC (ML): 0 ML
FIBRINOGEN PPP-MCNC: 810 MG/DL (ref 170–490)
GLUCOSE UR STRIP-MCNC: NEGATIVE MG/DL
HCT VFR BLD AUTO: 36.5 % (ref 35–47)
HGB BLD-MCNC: 12.1 G/DL (ref 11.7–15.7)
HGB UR QL STRIP: NEGATIVE
IF INDICATED RECOMMENDED DOSE OF RH IMMUNE GLOBULIN UG: 300 UG
INR PPP: 0.94 (ref 0.85–1.15)
KETONES UR STRIP-MCNC: ABNORMAL MG/DL
LEUKOCYTE ESTERASE UR QL STRIP: NEGATIVE
MCH RBC QN AUTO: 30.9 PG (ref 26.5–33)
MCHC RBC AUTO-ENTMCNC: 33.2 G/DL (ref 31.5–36.5)
MCV RBC AUTO: 93 FL (ref 78–100)
MUCOUS THREADS #/AREA URNS LPF: PRESENT /LPF
NITRATE UR QL: NEGATIVE
PH UR STRIP: 7 [PH] (ref 5–7)
PLATELET # BLD AUTO: 378 10E3/UL (ref 150–450)
RBC # BLD AUTO: 3.91 10E6/UL (ref 3.8–5.2)
RBC URINE: 1 /HPF
SP GR UR STRIP: 1.01 (ref 1–1.03)
SPECIMEN EXPIRATION DATE: NORMAL
SQUAMOUS EPITHELIAL: 4 /HPF
TRICHOMONAS, WET PREP: NORMAL
UROBILINOGEN UR STRIP-MCNC: NORMAL MG/DL
WBC # BLD AUTO: 10.7 10E3/UL (ref 4–11)
WBC URINE: 5 /HPF
WBC'S/HIGH POWER FIELD, WET PREP: NORMAL
YEAST, WET PREP: NORMAL

## 2024-03-26 PROCEDURE — 999N000127 HC STATISTIC PERIPHERAL IV START W US GUIDANCE

## 2024-03-26 PROCEDURE — 250N000013 HC RX MED GY IP 250 OP 250 PS 637

## 2024-03-26 PROCEDURE — 36415 COLL VENOUS BLD VENIPUNCTURE: CPT | Performed by: ADVANCED PRACTICE MIDWIFE

## 2024-03-26 PROCEDURE — 59025 FETAL NON-STRESS TEST: CPT | Mod: 26 | Performed by: OBSTETRICS & GYNECOLOGY

## 2024-03-26 PROCEDURE — 999N000104 HC STATISTIC NO CHARGE

## 2024-03-26 PROCEDURE — 258N000003 HC RX IP 258 OP 636

## 2024-03-26 PROCEDURE — 85730 THROMBOPLASTIN TIME PARTIAL: CPT | Performed by: ADVANCED PRACTICE MIDWIFE

## 2024-03-26 PROCEDURE — 87491 CHLMYD TRACH DNA AMP PROBE: CPT

## 2024-03-26 PROCEDURE — 81001 URINALYSIS AUTO W/SCOPE: CPT

## 2024-03-26 PROCEDURE — 85027 COMPLETE CBC AUTOMATED: CPT | Performed by: ADVANCED PRACTICE MIDWIFE

## 2024-03-26 PROCEDURE — 86900 BLOOD TYPING SEROLOGIC ABO: CPT | Performed by: ADVANCED PRACTICE MIDWIFE

## 2024-03-26 PROCEDURE — 85460 HEMOGLOBIN FETAL: CPT | Performed by: ADVANCED PRACTICE MIDWIFE

## 2024-03-26 PROCEDURE — 85610 PROTHROMBIN TIME: CPT | Performed by: ADVANCED PRACTICE MIDWIFE

## 2024-03-26 PROCEDURE — 99254 IP/OBS CNSLTJ NEW/EST MOD 60: CPT | Mod: 25 | Performed by: OBSTETRICS & GYNECOLOGY

## 2024-03-26 PROCEDURE — G0463 HOSPITAL OUTPT CLINIC VISIT: HCPCS

## 2024-03-26 PROCEDURE — 85384 FIBRINOGEN ACTIVITY: CPT | Performed by: ADVANCED PRACTICE MIDWIFE

## 2024-03-26 PROCEDURE — 87210 SMEAR WET MOUNT SALINE/INK: CPT

## 2024-03-26 RX ORDER — HYDROXYZINE HYDROCHLORIDE 25 MG/1
25 TABLET, FILM COATED ORAL ONCE
Status: COMPLETED | OUTPATIENT
Start: 2024-03-26 | End: 2024-03-26

## 2024-03-26 RX ORDER — METOCLOPRAMIDE 10 MG/1
10 TABLET ORAL EVERY 6 HOURS PRN
Status: DISCONTINUED | OUTPATIENT
Start: 2024-03-26 | End: 2024-03-26 | Stop reason: HOSPADM

## 2024-03-26 RX ORDER — METOCLOPRAMIDE HYDROCHLORIDE 5 MG/ML
10 INJECTION INTRAMUSCULAR; INTRAVENOUS EVERY 6 HOURS PRN
Status: DISCONTINUED | OUTPATIENT
Start: 2024-03-26 | End: 2024-03-26 | Stop reason: HOSPADM

## 2024-03-26 RX ORDER — ONDANSETRON 2 MG/ML
4 INJECTION INTRAMUSCULAR; INTRAVENOUS EVERY 6 HOURS PRN
Status: DISCONTINUED | OUTPATIENT
Start: 2024-03-26 | End: 2024-03-26 | Stop reason: HOSPADM

## 2024-03-26 RX ORDER — PROCHLORPERAZINE 25 MG
25 SUPPOSITORY, RECTAL RECTAL EVERY 12 HOURS PRN
Status: DISCONTINUED | OUTPATIENT
Start: 2024-03-26 | End: 2024-03-26 | Stop reason: HOSPADM

## 2024-03-26 RX ORDER — PROCHLORPERAZINE MALEATE 10 MG
10 TABLET ORAL EVERY 6 HOURS PRN
Status: DISCONTINUED | OUTPATIENT
Start: 2024-03-26 | End: 2024-03-26 | Stop reason: HOSPADM

## 2024-03-26 RX ORDER — ONDANSETRON 4 MG/1
4 TABLET, ORALLY DISINTEGRATING ORAL EVERY 6 HOURS PRN
Status: DISCONTINUED | OUTPATIENT
Start: 2024-03-26 | End: 2024-03-26 | Stop reason: HOSPADM

## 2024-03-26 RX ORDER — ACETAMINOPHEN 325 MG/1
975 TABLET ORAL ONCE
Status: COMPLETED | OUTPATIENT
Start: 2024-03-26 | End: 2024-03-26

## 2024-03-26 RX ADMIN — HYDROXYZINE HYDROCHLORIDE 25 MG: 25 TABLET, FILM COATED ORAL at 15:22

## 2024-03-26 RX ADMIN — ACETAMINOPHEN 975 MG: 325 TABLET, FILM COATED ORAL at 15:22

## 2024-03-26 RX ADMIN — SODIUM CHLORIDE, POTASSIUM CHLORIDE, SODIUM LACTATE AND CALCIUM CHLORIDE 1000 ML: 600; 310; 30; 20 INJECTION, SOLUTION INTRAVENOUS at 15:15

## 2024-03-26 ASSESSMENT — ACTIVITIES OF DAILY LIVING (ADL)
ADLS_ACUITY_SCORE: 33
ADLS_ACUITY_SCORE: 20
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
## 2024-03-26 NOTE — DISCHARGE INSTRUCTIONS
Learning About When to Call Your Doctor During Pregnancy (After 20 Weeks)  Overview  It's common to have concerns about what might be a problem when you're pregnant. Most pregnancies don't have any serious problems. But it's still important to know when to call your doctor if you have certain symptoms or signs of labor.  These are general suggestions. Your doctor may give you some more information about when to call.  When to call your doctor (after 20 weeks)  Call 911  anytime you think you may need emergency care. For example, call if:  You have severe vaginal bleeding. This means you are soaking through a pad each hour for 2 or more hours.  You have sudden, severe pain in your belly.  You have chest pain, are short of breath, or cough up blood.  You passed out (lost consciousness).  You have a seizure.  You see or feel the umbilical cord.  You think you are about to deliver your baby and can't make it safely to the hospital or birthing center.  Call your doctor now or seek immediate medical care if:  You have vaginal bleeding.  You have belly pain.  You have a fever.  You are dizzy or lightheaded, or you feel like you may faint.  You have signs of a blood clot in your leg (called a deep vein thrombosis), such as:  Pain in the calf, back of the knee, thigh, or groin.  Swelling in your leg or groin.  A color change on the leg or groin. The skin may be reddish or purplish, depending on your usual skin color.  You have symptoms of preeclampsia, such as:  Sudden swelling of your face, hands, or feet.  New vision problems (such as dimness, blurring, or seeing spots).  A severe headache.  You have a sudden release of fluid from your vagina. (You think your water broke.)  You've been having regular contractions for an hour. This means that you've had at least 6 contractions within 1 hour, even after you change your position and drink fluids.  You notice that your baby has stopped moving or is moving less than  "normal.  You have signs of heart failure, such as:  New or increased shortness of breath.  New or worse swelling in your legs, ankles, or feet.  Sudden weight gain, such as more than 2 to 3 pounds in a day or 5 pounds in a week.  Feeling so tired or weak that you cannot do your usual activities.  You have symptoms of a urinary tract infection. These may include:  Pain or burning when you urinate.  A frequent need to urinate without being able to pass much urine.  Pain in the flank, which is just below the rib cage and above the waist on either side of the back.  Blood in your urine.  Watch closely for changes in your health, and be sure to contact your doctor if:  You have vaginal discharge that smells bad.  You feel sad, anxious, or hopeless for more than a few days.  You have skin changes, such as a rash, itching, or a yellow color to your skin.  You have other concerns about your pregnancy.  If you have labor signs at 37 weeks or more  If you have signs of labor at 37 weeks or more, your doctor may tell you to call when your labor becomes more active. Symptoms of active labor include:  Contractions that are regular.  Contractions that are less than 5 minutes apart.  Contractions that are hard to talk through.  Follow-up care is a key part of your treatment and safety. Be sure to make and go to all appointments, and call your doctor if you are having problems. It's also a good idea to know your test results and keep a list of the medicines you take.  Where can you learn more?  Go to https://www.[employer].net/patiented  Enter N531 in the search box to learn more about \"Learning About When to Call Your Doctor During Pregnancy (After 20 Weeks).\"  Current as of: July 10, 2023               Content Version: 14.0    3242-0383 [employer].   Care instructions adapted under license by your healthcare professional. If you have questions about a medical condition or this instruction, always ask your healthcare " professional. [employer] disclaims any warranty or liability for your use of this information.
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
## 2024-03-26 NOTE — CARE PLAN
Data: Patient [facility] in the [facility] for [facility]. Cervix 0 (cervix long) cm dilated and   effaced. Membranes intact. Contractions are present. Contactions irregular (3 times in an hour) and last 40-60 seconds. Uterine assessment is soft by palpation at rest. See flowsheets for fetal assessment documentation.     Action: Presumed adequate fetal oxygenation documented. Discharge instructions reviewed. Patient instructed to report change in fetal movement, vaginal leaking of fluid or bleeding, or any concerns related to the pregnancy to provider/clinic.      Response: Orders to discharge home per Dr. Matias. Patient verbalized understanding of education and agreement with plan. Discharged to home at 1800.

## 2024-03-27 LAB
C TRACH DNA SPEC QL PROBE+SIG AMP: NEGATIVE
N GONORRHOEA DNA SPEC QL NAA+PROBE: NEGATIVE

## 2024-04-06 ENCOUNTER — HOSPITAL ENCOUNTER (EMERGENCY)
Facility: CLINIC | Age: 29
Discharge: HOME | End: 2024-04-06
Attending: OBSTETRICS & GYNECOLOGY | Admitting: OBSTETRICS & GYNECOLOGY
Payer: COMMERCIAL

## 2024-04-06 VITALS — DIASTOLIC BLOOD PRESSURE: 61 MMHG | SYSTOLIC BLOOD PRESSURE: 118 MMHG

## 2024-04-06 DIAGNOSIS — K59.09 OTHER CONSTIPATION: Primary | ICD-10-CM

## 2024-04-06 LAB
ALBUMIN UR-MCNC: 20 MG/DL
APPEARANCE UR: ABNORMAL
BACTERIA #/AREA URNS HPF: ABNORMAL /HPF
BILIRUB UR QL STRIP: NEGATIVE
CLUE CELLS: ABNORMAL
COLOR UR AUTO: YELLOW
GLUCOSE UR STRIP-MCNC: NEGATIVE MG/DL
HGB UR QL STRIP: NEGATIVE
KETONES UR STRIP-MCNC: NEGATIVE MG/DL
LEUKOCYTE ESTERASE UR QL STRIP: NEGATIVE
MUCOUS THREADS #/AREA URNS LPF: PRESENT /LPF
NITRATE UR QL: NEGATIVE
PH UR STRIP: 6 [PH] (ref 5–7)
RBC URINE: 5 /HPF
SP GR UR STRIP: 1.03 (ref 1–1.03)
SQUAMOUS EPITHELIAL: 9 /HPF
TRANSITIONAL EPI: <1 /HPF
TRICHOMONAS, WET PREP: ABNORMAL
UROBILINOGEN UR STRIP-MCNC: NORMAL MG/DL
WBC URINE: 4 /HPF
WBC'S/HIGH POWER FIELD, WET PREP: ABNORMAL
YEAST, WET PREP: ABNORMAL

## 2024-04-06 PROCEDURE — 99214 OFFICE O/P EST MOD 30 MIN: CPT | Mod: 25 | Performed by: STUDENT IN AN ORGANIZED HEALTH CARE EDUCATION/TRAINING PROGRAM

## 2024-04-06 PROCEDURE — 258N000003 HC RX IP 258 OP 636

## 2024-04-06 PROCEDURE — 59025 FETAL NON-STRESS TEST: CPT | Mod: 26 | Performed by: STUDENT IN AN ORGANIZED HEALTH CARE EDUCATION/TRAINING PROGRAM

## 2024-04-06 PROCEDURE — 96360 HYDRATION IV INFUSION INIT: CPT

## 2024-04-06 PROCEDURE — 87210 SMEAR WET MOUNT SALINE/INK: CPT

## 2024-04-06 PROCEDURE — G0463 HOSPITAL OUTPT CLINIC VISIT: HCPCS | Mod: 25

## 2024-04-06 PROCEDURE — 999N000104 HC STATISTIC NO CHARGE

## 2024-04-06 PROCEDURE — 81003 URINALYSIS AUTO W/O SCOPE: CPT

## 2024-04-06 RX ORDER — SENNA AND DOCUSATE SODIUM 50; 8.6 MG/1; MG/1
1 TABLET, FILM COATED ORAL AT BEDTIME
Qty: 60 TABLET | Refills: 1 | Status: ON HOLD | OUTPATIENT
Start: 2024-04-06 | End: 2024-05-26

## 2024-04-06 RX ADMIN — SODIUM CHLORIDE, POTASSIUM CHLORIDE, SODIUM LACTATE AND CALCIUM CHLORIDE 1000 ML: 600; 310; 30; 20 INJECTION, SOLUTION INTRAVENOUS at 15:45

## 2024-04-06 ASSESSMENT — ACTIVITIES OF DAILY LIVING (ADL)
ADLS_ACUITY_SCORE: 20
ADLS_ACUITY_SCORE: 33
ADLS_ACUITY_SCORE: 20

## 2024-04-06 NOTE — PLAN OF CARE
Pt [facility] at [facility] with complaints of contractions and decreased fetal movement. Baby very hard to monitor due to habitus. IV started. 1000ml bolus given. Pt feels better since IV fluids, no cramping or contractions felt. Has not had a BM in 2 days. Script sent to [facility] for senna. Pt ready for discharge. Pt prefers discharge instructions be on mychart. Discussed instructions and when to call or come back to hospital. Pt agrees with plan. Will call clinic for an appointment within 2 weeks. Discharged at 1650.

## 2024-04-06 NOTE — DISCHARGE INSTRUCTIONS
Learning About When to Call Your Doctor During Pregnancy (After 20 Weeks)  Overview  It's common to have concerns about what might be a problem when you're pregnant. Most pregnancies don't have any serious problems. But it's still important to know when to call your doctor if you have certain symptoms or signs of labor.  These are general suggestions. Your doctor may give you some more information about when to call.  When to call your doctor (after 20 weeks)  Call 911  anytime you think you may need emergency care. For example, call if:  You have severe vaginal bleeding. This means you are soaking through a pad each hour for 2 or more hours.  You have sudden, severe pain in your belly.  You have chest pain, are short of breath, or cough up blood.  You passed out (lost consciousness).  You have a seizure.  You see or feel the umbilical cord.  You think you are about to deliver your baby and can't make it safely to the hospital or birthing center.  Call your doctor now or seek immediate medical care if:  You have vaginal bleeding.  You have belly pain.  You have a fever.  You are dizzy or lightheaded, or you feel like you may faint.  You have signs of a blood clot in your leg (called a deep vein thrombosis), such as:  Pain in the calf, back of the knee, thigh, or groin.  Swelling in your leg or groin.  A color change on the leg or groin. The skin may be reddish or purplish, depending on your usual skin color.  You have symptoms of preeclampsia, such as:  Sudden swelling of your face, hands, or feet.  New vision problems (such as dimness, blurring, or seeing spots).  A severe headache.  You have a sudden release of fluid from your vagina. (You think your water broke.)  You've been having regular contractions for an hour. This means that you've had at least 6 contractions within 1 hour, even after you change your position and drink fluids.  You notice that your baby has stopped moving or is moving less than  "normal.  You have signs of heart failure, such as:  New or increased shortness of breath.  New or worse swelling in your legs, ankles, or feet.  Sudden weight gain, such as more than 2 to 3 pounds in a day or 5 pounds in a week.  Feeling so tired or weak that you cannot do your usual activities.  You have symptoms of a urinary tract infection. These may include:  Pain or burning when you urinate.  A frequent need to urinate without being able to pass much urine.  Pain in the flank, which is just below the rib cage and above the waist on either side of the back.  Blood in your urine.  Watch closely for changes in your health, and be sure to contact your doctor if:  You have vaginal discharge that smells bad.  You feel sad, anxious, or hopeless for more than a few days.  You have skin changes, such as a rash, itching, or a yellow color to your skin.  You have other concerns about your pregnancy.  If you have labor signs at 37 weeks or more  If you have signs of labor at 37 weeks or more, your doctor may tell you to call when your labor becomes more active. Symptoms of active labor include:  Contractions that are regular.  Contractions that are less than 5 minutes apart.  Contractions that are hard to talk through.  Follow-up care is a key part of your treatment and safety. Be sure to make and go to all appointments, and call your doctor if you are having problems. It's also a good idea to know your test results and keep a list of the medicines you take.  Where can you learn more?  Go to https://www.[employer].net/patiented  Enter N531 in the search box to learn more about \"Learning About When to Call Your Doctor During Pregnancy (After 20 Weeks).\"  Current as of: July 10, 2023               Content Version: 14.0    3677-2670 [employer]wise, Incorporated.   Care instructions adapted under license by your healthcare professional. If you have questions about a medical condition or this instruction, always ask your healthcare " professional. [employer] disclaims any warranty or liability for your use of this information.

## 2024-04-06 NOTE — ED NOTES
"26 weeks pregnant, stomach pain since 0400.     \"Gets really tight\" - comes and goes. Feels like she has to have a BM but unable. No fetal movement since last night. 3rd pregnancy.  "

## 2024-04-06 NOTE — PROGRESS NOTES
Obstetrics Triage Note    HPI:  Minda Silverman is a 28 year old  at 26w5d who presents with abdominal tightening/cramping and decreased FM over the day today.     Patient states that she felt baby move last night. Then was at work today at [employer] and started to feel more tightening and abdominal pain. No LOF, VB. Since arriving here, has started to feel more fetal movement. She does note that she has felt very constipated. Last BM was two days ago but feels constipated even after that BM. Is not taking any stool softener. Offered tylenol which patient declines.     She otherwise has been feeling well. She denies fever, N/V, chest pain, SOB, vaginal bleeding, vaginal discharge, dysuria.    Pregnancy Notable For:  - History-indicated Bryson cerclage in place  - H/o previable birth at 18w6d  - H/o PPROM,  delivery at 29w3d  - BMI >50    ROS:  Negative except as mentioned in HPI.    PMH:  Past Medical History:   Diagnosis Date    NO ACTIVE PROBLEMS        PSHx:  Past Surgical History:   Procedure Laterality Date    CERCLAGE CERVICAL N/A 2024    Procedure: CERCLAGE, CERVIX, VAGINAL APPROACH;  Surgeon: Starr Watts MD;  Location: [facility]    DILATION AND CURETTAGE SUCTION N/A 2022    Procedure: DILATION AND CURETTAGE, UTERUS, USING SUCTION;  Surgeon: Vika Hopper MD;  Location: [facility] L[facility]       Medications:  Current Facility-Administered Medications   Medication Dose Route Frequency Provider Last Rate Last Admin    lactated ringers BOLUS 1,000 mL  1,000 mL Intravenous Once Shanthi Cruz MD           Allergies:   No Known Allergies    Physical Exam:   Vitals:    24 1426   BP: 118/61       Gen: resting comfortably in bed, no acute distress  Abd: Soft, non-distended, no tenderness to palpation  : Normal external genitalia. Vagina well rugated, white discharge. Cervix partially visualized, appears closed with cerclage in place, no tension appreciated. On SVE, cervix closed  and feels about 2-3 cm long.    NST:  FHT: Periods of discontinuous tracing due to GA and body habitus. , moderate variability, occ  accelerations, no decelerations  [place]: quiet   Appropriate for gestational age     Labs:  Wet prep neg  UA in process at time of discharge    Assessment/Plan: Minda Silverman is a 28 year old  at 26w5d here for abdominal pain, rule out  labor.     Rule Out  Labor  H/o of PPROM,  Delivery  H/o Previable  Delivery  Bryson Cerclage in Place  Overall very reassuring exam and monitoring. Patient appears comfortable. IVF given. Wet prep collected and normal. UA pending at the time of discharge, though in the setting of a recently normal UA and no new symptoms, low concern for new urinary etiology. Suspect that abdominal pain in the setting of constipation given patient report of constipation and discomfort.   - Rx for senna sent to preferred pharmacy     Prenatal care   Gets her care at [facility] with CNM care when she comes to the triage, per patient report. MD team saw today. Recommend follow-up for routine prenatal care, including her GCT.     - Dispo: to home with follow up in the next week. Discussed tylenol for pain as needed. Discussed labor warning signs and indication to return to care.    Discussed with Dr. Bhupinder Collins MD, MPH  Obstetrics and Gyncology, PGY-3  2024, 3:04 PM      I personally examined and evaluated Minda Silverman on 2024.  I discussed the patient with Dr. Jessica Collins and agree with the presentation, exam and plan of care documented in this note with edits by me.   Mary Roe MD

## 2024-05-04 ENCOUNTER — HEALTH MAINTENANCE LETTER (OUTPATIENT)
Age: 29
End: 2024-05-04

## 2024-05-06 ENCOUNTER — OFFICE VISIT (OUTPATIENT)
Dept: MATERNAL FETAL MEDICINE | Facility: CLINIC | Age: 29
End: 2024-05-06
Attending: OBSTETRICS & GYNECOLOGY
Payer: COMMERCIAL

## 2024-05-06 ENCOUNTER — HOSPITAL ENCOUNTER (OUTPATIENT)
Dept: ULTRASOUND IMAGING | Facility: CLINIC | Age: 29
Discharge: HOME | End: 2024-05-06
Attending: OBSTETRICS & GYNECOLOGY
Payer: COMMERCIAL

## 2024-05-06 DIAGNOSIS — O99.210 OBESITY IN PREGNANCY: ICD-10-CM

## 2024-05-06 DIAGNOSIS — O99.210 OBESITY IN PREGNANCY: Primary | ICD-10-CM

## 2024-05-06 DIAGNOSIS — E66.01 OBESITY, MORBID (H): ICD-10-CM

## 2024-05-06 PROCEDURE — 76816 OB US FOLLOW-UP PER FETUS: CPT

## 2024-05-06 PROCEDURE — 76816 OB US FOLLOW-UP PER FETUS: CPT | Mod: 26 | Performed by: OBSTETRICS & GYNECOLOGY

## 2024-05-06 NOTE — PROGRESS NOTES
Please refer to ultrasound report under 'Imaging' Studies of 'Chart Review' tabs.    Peterson Spaulding M.D.

## 2024-05-06 NOTE — NURSING NOTE
Patient reports positive fetal movement, denies pain, denies contractions, leaking of fluid, or bleeding. Education provided to patient on today's ultrasound.  SBAR given to LEONARDO ALVAREZ, see their note in Epic.

## 2024-05-13 ENCOUNTER — TRANSFERRED RECORDS (OUTPATIENT)
Dept: HEALTH INFORMATION MANAGEMENT | Facility: CLINIC | Age: 29
End: 2024-05-13
Payer: COMMERCIAL

## 2024-05-22 ENCOUNTER — HOSPITAL ENCOUNTER (OUTPATIENT)
Facility: CLINIC | Age: 29
Discharge: HOME | End: 2024-05-22
Attending: OBSTETRICS & GYNECOLOGY | Admitting: OBSTETRICS & GYNECOLOGY
Payer: COMMERCIAL

## 2024-05-22 ENCOUNTER — ANESTHESIA (OUTPATIENT)
Dept: OBGYN | Facility: CLINIC | Age: 29
End: 2024-05-22
Payer: COMMERCIAL

## 2024-05-22 ENCOUNTER — ANESTHESIA EVENT (OUTPATIENT)
Dept: OBGYN | Facility: CLINIC | Age: 29
End: 2024-05-22
Payer: COMMERCIAL

## 2024-05-22 VITALS
SYSTOLIC BLOOD PRESSURE: 103 MMHG | DIASTOLIC BLOOD PRESSURE: 56 MMHG | HEIGHT: 64 IN | BODY MASS INDEX: 50.02 KG/M2 | TEMPERATURE: 97.9 F | RESPIRATION RATE: 17 BRPM | WEIGHT: 293 LBS

## 2024-05-22 PROBLEM — O26.90 PREGNANCY, ANTEPARTUM, COMPLICATIONS: Status: ACTIVE | Noted: 2024-05-22

## 2024-05-22 LAB
ALBUMIN UR-MCNC: 10 MG/DL
APPEARANCE UR: CLEAR
BILIRUB UR QL STRIP: NEGATIVE
CLUE CELLS: ABNORMAL
COLOR UR AUTO: YELLOW
GLUCOSE UR STRIP-MCNC: NEGATIVE MG/DL
HGB UR QL STRIP: NEGATIVE
HYALINE CASTS: 3 /LPF
KETONES UR STRIP-MCNC: ABNORMAL MG/DL
LEUKOCYTE ESTERASE UR QL STRIP: NEGATIVE
MUCOUS THREADS #/AREA URNS LPF: PRESENT /LPF
NITRATE UR QL: NEGATIVE
PH UR STRIP: 6 [PH] (ref 5–7)
RBC URINE: 0 /HPF
SP GR UR STRIP: 1.02 (ref 1–1.03)
SQUAMOUS EPITHELIAL: 2 /HPF
TRICHOMONAS, WET PREP: ABNORMAL
UROBILINOGEN UR STRIP-MCNC: NORMAL MG/DL
WBC URINE: 2 /HPF
WBC'S/HIGH POWER FIELD, WET PREP: ABNORMAL
YEAST, WET PREP: ABNORMAL

## 2024-05-22 PROCEDURE — 87491 CHLMYD TRACH DNA AMP PROBE: CPT

## 2024-05-22 PROCEDURE — 999N000104 HC STATISTIC NO CHARGE

## 2024-05-22 PROCEDURE — G0463 HOSPITAL OUTPT CLINIC VISIT: HCPCS | Mod: 25

## 2024-05-22 PROCEDURE — 81001 URINALYSIS AUTO W/SCOPE: CPT

## 2024-05-22 PROCEDURE — 87653 STREP B DNA AMP PROBE: CPT

## 2024-05-22 PROCEDURE — 87086 URINE CULTURE/COLONY COUNT: CPT

## 2024-05-22 PROCEDURE — 250N000013 HC RX MED GY IP 250 OP 250 PS 637

## 2024-05-22 PROCEDURE — 87210 SMEAR WET MOUNT SALINE/INK: CPT

## 2024-05-22 PROCEDURE — 59025 FETAL NON-STRESS TEST: CPT | Mod: 26 | Performed by: OBSTETRICS & GYNECOLOGY

## 2024-05-22 PROCEDURE — 99214 OFFICE O/P EST MOD 30 MIN: CPT | Mod: 25 | Performed by: OBSTETRICS & GYNECOLOGY

## 2024-05-22 RX ORDER — HYDROXYZINE HYDROCHLORIDE 50 MG/1
50-100 TABLET, FILM COATED ORAL EVERY 6 HOURS PRN
Status: DISCONTINUED | OUTPATIENT
Start: 2024-05-22 | End: 2024-05-23 | Stop reason: HOSPADM

## 2024-05-22 RX ORDER — PROCHLORPERAZINE MALEATE 10 MG
10 TABLET ORAL EVERY 6 HOURS PRN
Status: DISCONTINUED | OUTPATIENT
Start: 2024-05-22 | End: 2024-05-23 | Stop reason: HOSPADM

## 2024-05-22 RX ORDER — MAGNESIUM HYDROXIDE/ALUMINUM HYDROXICE/SIMETHICONE 120; 1200; 1200 MG/30ML; MG/30ML; MG/30ML
30 SUSPENSION ORAL
Status: DISCONTINUED | OUTPATIENT
Start: 2024-05-22 | End: 2024-05-23 | Stop reason: HOSPADM

## 2024-05-22 RX ORDER — METOCLOPRAMIDE HYDROCHLORIDE 5 MG/ML
10 INJECTION INTRAMUSCULAR; INTRAVENOUS EVERY 6 HOURS PRN
Status: DISCONTINUED | OUTPATIENT
Start: 2024-05-22 | End: 2024-05-23 | Stop reason: HOSPADM

## 2024-05-22 RX ORDER — LIDOCAINE 40 MG/G
CREAM TOPICAL
Status: DISCONTINUED | OUTPATIENT
Start: 2024-05-22 | End: 2024-05-23 | Stop reason: HOSPADM

## 2024-05-22 RX ORDER — SODIUM CHLORIDE, SODIUM LACTATE, POTASSIUM CHLORIDE, CALCIUM CHLORIDE 600; 310; 30; 20 MG/100ML; MG/100ML; MG/100ML; MG/100ML
INJECTION, SOLUTION INTRAVENOUS CONTINUOUS
Status: DISCONTINUED | OUTPATIENT
Start: 2024-05-23 | End: 2024-05-23 | Stop reason: HOSPADM

## 2024-05-22 RX ORDER — PROCHLORPERAZINE 25 MG
25 SUPPOSITORY, RECTAL RECTAL EVERY 12 HOURS PRN
Status: DISCONTINUED | OUTPATIENT
Start: 2024-05-22 | End: 2024-05-23 | Stop reason: HOSPADM

## 2024-05-22 RX ORDER — METOCLOPRAMIDE 10 MG/1
10 TABLET ORAL EVERY 6 HOURS PRN
Status: DISCONTINUED | OUTPATIENT
Start: 2024-05-22 | End: 2024-05-23 | Stop reason: HOSPADM

## 2024-05-22 RX ORDER — TERBUTALINE SULFATE 1 MG/ML
0.25 INJECTION, SOLUTION SUBCUTANEOUS
Status: DISCONTINUED | OUTPATIENT
Start: 2024-05-22 | End: 2024-05-23 | Stop reason: HOSPADM

## 2024-05-22 RX ORDER — ACETAMINOPHEN 325 MG/1
975 TABLET ORAL EVERY 6 HOURS PRN
Status: DISCONTINUED | OUTPATIENT
Start: 2024-05-22 | End: 2024-05-23 | Stop reason: HOSPADM

## 2024-05-22 RX ORDER — ONDANSETRON 4 MG/1
4 TABLET, ORALLY DISINTEGRATING ORAL EVERY 6 HOURS PRN
Status: DISCONTINUED | OUTPATIENT
Start: 2024-05-22 | End: 2024-05-23 | Stop reason: HOSPADM

## 2024-05-22 RX ORDER — ONDANSETRON 2 MG/ML
4 INJECTION INTRAMUSCULAR; INTRAVENOUS EVERY 6 HOURS PRN
Status: DISCONTINUED | OUTPATIENT
Start: 2024-05-22 | End: 2024-05-23 | Stop reason: HOSPADM

## 2024-05-22 RX ADMIN — ACETAMINOPHEN 975 MG: 325 TABLET, FILM COATED ORAL at 23:16

## 2024-05-22 ASSESSMENT — ACTIVITIES OF DAILY LIVING (ADL)
ADLS_ACUITY_SCORE: 35

## 2024-05-23 LAB
BACTERIA UR CULT: ABNORMAL
C TRACH DNA SPEC QL PROBE+SIG AMP: NEGATIVE
GP B STREP DNA SPEC QL NAA+PROBE: POSITIVE
N GONORRHOEA DNA SPEC QL NAA+PROBE: NEGATIVE

## 2024-05-23 NOTE — DISCHARGE INSTRUCTIONS
Learning About When to Call Your Doctor During Pregnancy (After 20 Weeks)  Overview  It's common to have concerns about what might be a problem when you're pregnant. Most pregnancies don't have any serious problems. But it's still important to know when to call your doctor if you have certain symptoms or signs of labor.  These are general suggestions. Your doctor may give you some more information about when to call.  When to call your doctor (after 20 weeks)  Call 911  anytime you think you may need emergency care. For example, call if:  You have severe vaginal bleeding. This means you are soaking through a pad each hour for 2 or more hours.  You have sudden, severe pain in your belly.  You have chest pain, are short of breath, or cough up blood.  You passed out (lost consciousness).  You have a seizure.  You see or feel the umbilical cord.  You think you are about to deliver your baby and can't make it safely to the hospital or birthing center.  Call your doctor now or seek immediate medical care if:  You have vaginal bleeding.  You have belly pain.  You have a fever.  You are dizzy or lightheaded, or you feel like you may faint.  You have signs of a blood clot in your leg (called a deep vein thrombosis), such as:  Pain in the calf, back of the knee, thigh, or groin.  Swelling in your leg or groin.  A color change on the leg or groin. The skin may be reddish or purplish, depending on your usual skin color.  You have symptoms of preeclampsia, such as:  Sudden swelling of your face, hands, or feet.  New vision problems (such as dimness, blurring, or seeing spots).  A severe headache.  You have a sudden release of fluid from your vagina. (You think your water broke.)  You've been having regular contractions for an hour. This means that you've had at least 6 contractions within 1 hour, even after you change your position and drink fluids.  You notice that your baby has stopped moving or is moving less than  "normal.  You have signs of heart failure, such as:  New or increased shortness of breath.  New or worse swelling in your legs, ankles, or feet.  Sudden weight gain, such as more than 2 to 3 pounds in a day or 5 pounds in a week.  Feeling so tired or weak that you cannot do your usual activities.  You have symptoms of a urinary tract infection. These may include:  Pain or burning when you urinate.  A frequent need to urinate without being able to pass much urine.  Pain in the flank, which is just below the rib cage and above the waist on either side of the back.  Blood in your urine.  Watch closely for changes in your health, and be sure to contact your doctor if:  You have vaginal discharge that smells bad.  You feel sad, anxious, or hopeless for more than a few days.  You have skin changes, such as a rash, itching, or a yellow color to your skin.  You have other concerns about your pregnancy.  If you have labor signs at 37 weeks or more  If you have signs of labor at 37 weeks or more, your doctor may tell you to call when your labor becomes more active. Symptoms of active labor include:  Contractions that are regular.  Contractions that are less than 5 minutes apart.  Contractions that are hard to talk through.  Follow-up care is a key part of your treatment and safety. Be sure to make and go to all appointments, and call your doctor if you are having problems. It's also a good idea to know your test results and keep a list of the medicines you take.  Where can you learn more?  Go to https://[email or web address].net/patiented  Enter N531 in the search box to learn more about \"Learning About When to Call Your Doctor During Pregnancy (After 20 Weeks).\"  Current as of: July 10, 2023               Content Version: 14.0    2331-1393 Healthwise, Incorporated.   Care instructions adapted under license by your healthcare professional. If you have questions about a medical condition or this instruction, always ask your healthcare " professional. [employer] disclaims any warranty or liability for your use of this information.

## 2024-05-23 NOTE — PROGRESS NOTES
Labor & Delivery  OB Triage Note    HPI: Barry Watts is a 28 year old  at 33w2d by 6w5d US here for abdominal cramping.    She woke up to urinate at 0600 this morning and thinks she passed her mucus plug at that time. Since then, she has been feeling mild cramps, which were initially infrequent. Since 1000, she states the cramping is every 10-20 minutes. Still mild intensity. She has been eating and drinking normally though notes she has low appetite chronically and has to force herself to eat.     She denies vaginal bleeding or loss of fluid and is feeling normal fetal movement. She denies headache, vision changes, chest pain, shortness of breath, fever, chills, nausea, vomiting or other systemic complaints. No dysuria, changes in vaginal discharge, or edema in extremities noted.      Pregnancy notable for:  - Suspected LGA  - Cerclage in place  - Obesity    OB History   OB History    Para Term  AB Living   4 1 0 1 2 1   SAB IAB Ectopic Multiple Live Births   1 0 0 0 1      # Outcome Date GA Lbr Chris/2nd Weight Sex Type Anes PTL Lv   4 Current            3 AB 22 18w6d  0.218 kg (7.7 oz)     FD      Name: RICK WATTS,PENDING BABY BARRY CHAPPELL      Apgar1: 0  Apgar5: 0   2  / 29w3d / 00:07 1.156 kg (2 lb 8.8 oz) F Vag-Spont EPI Y MARIA TERESA      Name: RICK,BABY GIRL      Apgar1: 6  Apgar5: 7   1 SAB  14w0d            Past Medical History  Past Medical History:   Diagnosis Date    NO ACTIVE PROBLEMS      Past Surgical History  Past Surgical History:   Procedure Laterality Date    CERCLAGE CERVICAL N/A 2024    Procedure: CERCLAGE, CERVIX, VAGINAL APPROACH;  Surgeon: Starr Watts MD;  [facility]    DILATION AND CURETTAGE SUCTION N/A 2022    Procedure: DILATION AND CURETTAGE, UTERUS, USING SUCTION;  Surgeon: Vika Hopper MD;  Location: [facility]     Medications   No medications prior to admission.     Allergies  No Known Allergies    Family  "History  No family history on file.    Social History   Social History     Tobacco Use    Smoking status: Former     Types: Cigarettes    Smokeless tobacco: Never   Substance Use Topics    Alcohol use: No    Drug use: Never     Physical Exam  Vitals:    24 2034 24 2315   BP: 99/54 103/56   BP Location: Left arm Left arm   Patient Position: Semi-Armstrong's Semi-Armstrong's   Cuff Size: Adult Large Adult Large   Resp: 17    Temp: 97.9  F (36.6  C)    TempSrc: Oral    Weight:  147 kg (324 lb)   Height:  1.626 m (5' 4\")     General: alert, oriented female, resting in bed in NAD  CV: regular rate and rhythm  Lungs: normal rate and work of breathing on room air  Abdomen: soft, gravid, non-tender to palpation    SSE: visually closed cerix; cerclage strings and knot visually without signs of tension; no pooling, leaking, or bleeding; small physiologic discharge  Membranes: intact    FHT: baseline 130 bpm, moderate variability, accelerations present, decelerations absent  [place]: 0 contractions per 10 minutes    A/P: 28 year old  at 33w2d who presents for abdominal cramping. Pregnancy otherwise notable for obesity, LGA infant, and history-indicated cerclage.    # Rule Out  Labor  - Cervix: visually closed with no signs of tension on cerclage  - Labs: UA neg w/ Ucx pending, Wet Prep neg, GC/CT pending, GBS pending  - Oral Hydration     # Fetal Well Being  - Continuous Monitoring  - FHT: Category I, reactive    Dispo: discharge to home with routine OB follow-up; review return precautions for  labor    Patient discussed with Dr. Cole.    Mecca Pulliam MD  Obstetrics & Gynecology, PGY-2  2024 9:55 PM   "

## 2024-05-23 NOTE — CARE PLAN
Data: Patient assessed in the [facility] for uterine contractions. Cervix 0 cm dilated visually via speculum Membranes intact. Contractions are present. Contactions are  mild and irregular. Uterine assessment is  mild during contractions and  soft at rest. See flowsheets for fetal assessment documentation.     Action: Presumed adequate fetal oxygenation documented. Discharge instructions reviewed. Patient instructed to report change in fetal movement, vaginal leaking of fluid or bleeding, abdominal pain, or any concerns related to the pregnancy to provider/clinic.      Response: Orders to discharge home per Dr. Cole and Dr. Pulliam. Patient verbalized understanding of education and agreement with plan. Discharged to home at 2350.

## 2024-05-23 NOTE — ED TRIAGE NOTES
33 weeks pregnant. Contractions every 10-15 min. Started around 6 am this morning, mucous plug came out around 6 am this morning. No urge to push currently.

## 2024-05-23 NOTE — ANESTHESIA PREPROCEDURE EVALUATION
Anesthesia Pre-Procedure Evaluation    Patient: Minda Silverman   MRN: [id number] : 1995        Procedure :           Past Medical History:   Diagnosis Date    NO ACTIVE PROBLEMS       Past Surgical History:   Procedure Laterality Date    CERCLAGE CERVICAL N/A 2024    Procedure: CERCLAGE, CERVIX, VAGINAL APPROACH;  Surgeon: Starr Watts MD;  Location: [facility]    DILATION AND CURETTAGE SUCTION N/A 2022    Procedure: DILATION AND CURETTAGE, UTERUS, USING SUCTION;  Surgeon: Vika Hopper MD;  Location: [facility]      No Known Allergies   Social History     Tobacco Use    Smoking status: Former     Types: Cigarettes    Smokeless tobacco: Never   Substance Use Topics    Alcohol use: No      Wt Readings from Last 1 Encounters:   24 145 kg (319 lb 11.2 oz)        Anesthesia Evaluation   Pt has had prior anesthetic. Type: OB Labor Epidural and Regional.        ROS/MED HX  ENT/Pulmonary:  - neg pulmonary ROS     Neurologic:  - neg neurologic ROS     Cardiovascular:  - neg cardiovascular ROS     METS/Exercise Tolerance:     Hematologic:  - neg hematologic  ROS     Musculoskeletal:       GI/Hepatic:  - neg GI/hepatic ROS     Renal/Genitourinary:       Endo:  - neg endo ROS     Psychiatric/Substance Use:  - neg psychiatric ROS     Infectious Disease:       Malignancy:       Other:   27yo  at 33w2d here in labor         Physical Exam    Airway  airway exam normal      Mallampati: II   TM distance: > 3 FB   Neck ROM: full   Mouth opening: > 3 cm    Respiratory Devices and Support         Dental       (+) Minor Abnormalities - some fillings, tiny chips      Cardiovascular   cardiovascular exam normal       Rhythm and rate: normal     Pulmonary   pulmonary exam normal                OUTSIDE LABS:  CBC:   Lab Results   Component Value Date    WBC 10.7 2024    WBC 12.1 (H) 2024    HGB 12.1 2024    HGB 11.9 2024    HCT 36.5 2024    HCT 36.7 2024    PLT  378 03/26/2024     02/28/2024     BMP:   Lab Results   Component Value Date     (L) 02/28/2024     10/04/2021    POTASSIUM 3.6 02/28/2024    POTASSIUM 3.7 10/04/2021    CHLORIDE 100 02/28/2024    CHLORIDE 105 10/04/2021    CO2 25 02/28/2024    CO2 26 10/04/2021    BUN 5.5 (L) 02/28/2024    BUN 8 10/04/2021    CR 0.44 (L) 02/28/2024    CR 0.63 10/04/2021    GLC 82 02/28/2024    GLC 98 10/04/2021     COAGS:   Lab Results   Component Value Date    PTT 31 03/26/2024    INR 0.94 03/26/2024    FIBR 810 (H) 03/26/2024     POC:   Lab Results   Component Value Date    HCG Positive (A) 11/18/2023    HCGS Negative 10/04/2021     HEPATIC:   Lab Results   Component Value Date    ALBUMIN 3.7 02/28/2024    PROTTOTAL 7.5 02/28/2024    ALT 14 02/28/2024    AST 17 02/28/2024    ALKPHOS 84 02/28/2024    BILITOTAL 0.3 02/28/2024     OTHER:   Lab Results   Component Value Date    LACT 1.2 06/07/2021    MAURILIO 9.3 02/28/2024    MAG 1.8 02/28/2024    LIPASE 80 10/04/2021    TSH 1.79 02/28/2024       Anesthesia Plan    ASA Status:  3, emergent    NPO Status:  ELEVATED Aspiration Risk/Unknown    Anesthesia Type: Spinal.   Induction: N/a.   Maintenance: N/A.        Consents    Anesthesia Plan(s) and associated risks, benefits, and realistic alternatives discussed. Questions answered and patient/representative(s) expressed understanding.     - Discussed: Risks, Benefits and Alternatives for BOTH SEDATION and the PROCEDURE were discussed     - Discussed with:  Patient, Other (See Comment)      - Extended Intubation/Ventilatory Support Discussed: No.      - Patient is DNR/DNI Status: No     Use of blood products discussed: No .     Postoperative Care    Pain management: Multi-modal analgesia, Peripheral nerve block (Single Shot).   PONV prophylaxis: Ondansetron (or other 5HT-3), Dexamethasone or Solumedrol     Comments:    Other Comments: Plan for DPE technique after reviewing previous charted difficult procedures. Anticipate  SUSANA around 9.5cm.     It was reported to me that the father of baby requested no male providers. It was discussed with him by OB providers that this could not be accommodated.  It was also reported to me that there was a request to film the entire procedure which was also declined by OB.             Dale Basurto MD    I have reviewed the pertinent notes and labs in the chart from the past 30 days and (re)examined the patient.  Any updates or changes from those notes are reflected in this note.

## 2024-05-23 NOTE — CARE PLAN
Data: [facility]: 2024  8:16 PM.  Reason for maternal/fetal assessment is uterine contractions. Patient reports mild contractions that come and go. Patient denies leaking of vaginal fluid/rupture of membranes, vaginal bleeding, abdominal pain, pelvic pressure, nausea, vomiting, headache, visual disturbances, epigastric or RUQ pain, significant edema. Patient reports fetal movement is normal. Patient is a 33w2d .  Prenatal record reviewed. Pregnancy has been complicated by cerclage in place .    Vital signs wnl. Support person is present.     Action: Verbal consent for EFM. Triage assessment completed.     Response: Patient verbalized agreement with plan. Will contact Dr. Pulliam and Dr. Cole with update and further orders.

## 2024-05-24 ENCOUNTER — HOSPITAL ENCOUNTER (INPATIENT)
Facility: CLINIC | Age: 29
LOS: 3 days | Discharge: HOME | End: 2024-05-27
Attending: MIDWIFE | Admitting: OBSTETRICS & GYNECOLOGY
Payer: COMMERCIAL

## 2024-05-24 DIAGNOSIS — Z98.891 S/P PRIMARY LOW TRANSVERSE C-SECTION: Primary | ICD-10-CM

## 2024-05-24 PROBLEM — O42.919 PRETERM PREMATURE RUPTURE OF MEMBRANES (PPROM) WITH UNKNOWN ONSET OF LABOR: Status: ACTIVE | Noted: 2024-05-24

## 2024-05-24 LAB
ABO/RH(D): NORMAL
ANTIBODY SCREEN: NEGATIVE
C TRACH DNA SPEC QL NAA+PROBE: NEGATIVE
CRYSTALS AMN MICRO: ABNORMAL
ERYTHROCYTE [DISTWIDTH] IN BLOOD BY AUTOMATED COUNT: 13.5 % (ref 10–15)
HCT VFR BLD AUTO: 36 % (ref 35–47)
HGB BLD-MCNC: 11.9 G/DL (ref 11.7–15.7)
MCH RBC QN AUTO: 31.6 PG (ref 26.5–33)
MCHC RBC AUTO-ENTMCNC: 33.1 G/DL (ref 31.5–36.5)
MCV RBC AUTO: 96 FL (ref 78–100)
N GONORRHOEA DNA SPEC QL NAA+PROBE: NEGATIVE
PLATELET # BLD AUTO: 353 10E3/UL (ref 150–450)
RBC # BLD AUTO: 3.76 10E6/UL (ref 3.8–5.2)
RUPTURE OF FETAL MEMBRANES BY ROM PLUS: POSITIVE
SPECIMEN EXPIRATION DATE: NORMAL
T PALLIDUM AB SER QL: NONREACTIVE
WBC # BLD AUTO: 9.9 10E3/UL (ref 4–11)

## 2024-05-24 PROCEDURE — 88307 TISSUE EXAM BY PATHOLOGIST: CPT | Mod: TC | Performed by: OBSTETRICS & GYNECOLOGY

## 2024-05-24 PROCEDURE — 360N000076 HC SURGERY LEVEL 3, PER MIN: Performed by: OBSTETRICS & GYNECOLOGY

## 2024-05-24 PROCEDURE — 99222 1ST HOSP IP/OBS MODERATE 55: CPT | Mod: 25 | Performed by: OBSTETRICS & GYNECOLOGY

## 2024-05-24 PROCEDURE — 271N000001 HC OR GENERAL SUPPLY NON-STERILE: Performed by: OBSTETRICS & GYNECOLOGY

## 2024-05-24 PROCEDURE — 87491 CHLMYD TRACH DNA AMP PROBE: CPT | Performed by: MIDWIFE

## 2024-05-24 PROCEDURE — 999N000141 HC STATISTIC PRE-PROCEDURE NURSING ASSESSMENT: Performed by: OBSTETRICS & GYNECOLOGY

## 2024-05-24 PROCEDURE — 258N000003 HC RX IP 258 OP 636

## 2024-05-24 PROCEDURE — 59025 FETAL NON-STRESS TEST: CPT

## 2024-05-24 PROCEDURE — G0463 HOSPITAL OUTPT CLINIC VISIT: HCPCS | Mod: 25

## 2024-05-24 PROCEDURE — 0UCC7ZZ EXTIRPATION OF MATTER FROM CERVIX, VIA NATURAL OR ARTIFICIAL OPENING: ICD-10-PCS | Performed by: OBSTETRICS & GYNECOLOGY

## 2024-05-24 PROCEDURE — 370N000017 HC ANESTHESIA TECHNICAL FEE, PER MIN: Performed by: OBSTETRICS & GYNECOLOGY

## 2024-05-24 PROCEDURE — 59025 FETAL NON-STRESS TEST: CPT | Mod: 26 | Performed by: OBSTETRICS & GYNECOLOGY

## 2024-05-24 PROCEDURE — 59514 CESAREAN DELIVERY ONLY: CPT | Mod: GC | Performed by: OBSTETRICS & GYNECOLOGY

## 2024-05-24 PROCEDURE — 258N000003 HC RX IP 258 OP 636: Performed by: OBSTETRICS & GYNECOLOGY

## 2024-05-24 PROCEDURE — 85041 AUTOMATED RBC COUNT: CPT | Performed by: MIDWIFE

## 2024-05-24 PROCEDURE — 250N000011 HC RX IP 250 OP 636: Performed by: STUDENT IN AN ORGANIZED HEALTH CARE EDUCATION/TRAINING PROGRAM

## 2024-05-24 PROCEDURE — 120N000002 HC R&B MED SURG/OB UMMC

## 2024-05-24 PROCEDURE — 59514 CESAREAN DELIVERY ONLY: CPT | Performed by: STUDENT IN AN ORGANIZED HEALTH CARE EDUCATION/TRAINING PROGRAM

## 2024-05-24 PROCEDURE — 86900 BLOOD TYPING SEROLOGIC ABO: CPT | Performed by: MIDWIFE

## 2024-05-24 PROCEDURE — 84112 EVAL AMNIOTIC FLUID PROTEIN: CPT

## 2024-05-24 PROCEDURE — C9290 INJ, BUPIVACAINE LIPOSOME: HCPCS | Performed by: STUDENT IN AN ORGANIZED HEALTH CARE EDUCATION/TRAINING PROGRAM

## 2024-05-24 PROCEDURE — 87591 N.GONORRHOEAE DNA AMP PROB: CPT | Performed by: MIDWIFE

## 2024-05-24 PROCEDURE — 710N000010 HC RECOVERY PHASE 1, LEVEL 2, PER MIN: Performed by: OBSTETRICS & GYNECOLOGY

## 2024-05-24 PROCEDURE — 250N000011 HC RX IP 250 OP 636

## 2024-05-24 PROCEDURE — 87653 STREP B DNA AMP PROBE: CPT | Performed by: MIDWIFE

## 2024-05-24 PROCEDURE — 86780 TREPONEMA PALLIDUM: CPT | Performed by: MIDWIFE

## 2024-05-24 PROCEDURE — 59514 CESAREAN DELIVERY ONLY: CPT | Mod: 80 | Performed by: OBSTETRICS & GYNECOLOGY

## 2024-05-24 PROCEDURE — 250N000009 HC RX 250: Performed by: STUDENT IN AN ORGANIZED HEALTH CARE EDUCATION/TRAINING PROGRAM

## 2024-05-24 PROCEDURE — 272N000001 HC OR GENERAL SUPPLY STERILE: Performed by: OBSTETRICS & GYNECOLOGY

## 2024-05-24 PROCEDURE — 59514 CESAREAN DELIVERY ONLY: CPT | Performed by: NURSE ANESTHETIST, CERTIFIED REGISTERED

## 2024-05-24 PROCEDURE — 258N000003 HC RX IP 258 OP 636: Performed by: STUDENT IN AN ORGANIZED HEALTH CARE EDUCATION/TRAINING PROGRAM

## 2024-05-24 PROCEDURE — 999N000127 HC STATISTIC PERIPHERAL IV START W US GUIDANCE

## 2024-05-24 PROCEDURE — 88307 TISSUE EXAM BY PATHOLOGIST: CPT | Mod: 26 | Performed by: PATHOLOGY

## 2024-05-24 PROCEDURE — 999N000104 HC STATISTIC NO CHARGE

## 2024-05-24 PROCEDURE — 250N000013 HC RX MED GY IP 250 OP 250 PS 637

## 2024-05-24 PROCEDURE — 36415 COLL VENOUS BLD VENIPUNCTURE: CPT | Performed by: MIDWIFE

## 2024-05-24 RX ORDER — ONDANSETRON 2 MG/ML
4 INJECTION INTRAMUSCULAR; INTRAVENOUS EVERY 6 HOURS PRN
Status: DISCONTINUED | OUTPATIENT
Start: 2024-05-24 | End: 2024-05-25

## 2024-05-24 RX ORDER — ONDANSETRON 4 MG/1
4 TABLET, ORALLY DISINTEGRATING ORAL EVERY 6 HOURS PRN
Status: DISCONTINUED | OUTPATIENT
Start: 2024-05-24 | End: 2024-05-25

## 2024-05-24 RX ORDER — ACETAMINOPHEN 325 MG/1
975 TABLET ORAL EVERY 6 HOURS PRN
Status: DISCONTINUED | OUTPATIENT
Start: 2024-05-24 | End: 2024-05-24 | Stop reason: HOSPADM

## 2024-05-24 RX ORDER — METOCLOPRAMIDE HYDROCHLORIDE 5 MG/ML
10 INJECTION INTRAMUSCULAR; INTRAVENOUS EVERY 6 HOURS PRN
Status: DISCONTINUED | OUTPATIENT
Start: 2024-05-24 | End: 2024-05-24 | Stop reason: HOSPADM

## 2024-05-24 RX ORDER — SODIUM CHLORIDE, SODIUM LACTATE, POTASSIUM CHLORIDE, CALCIUM CHLORIDE 600; 310; 30; 20 MG/100ML; MG/100ML; MG/100ML; MG/100ML
INJECTION, SOLUTION INTRAVENOUS CONTINUOUS
Status: DISCONTINUED | OUTPATIENT
Start: 2024-05-24 | End: 2024-05-25

## 2024-05-24 RX ORDER — LIDOCAINE 40 MG/G
CREAM TOPICAL
Status: DISCONTINUED | OUTPATIENT
Start: 2024-05-24 | End: 2024-05-25

## 2024-05-24 RX ORDER — AMOXICILLIN 250 MG/1
250 CAPSULE ORAL 3 TIMES DAILY
Qty: 15 CAPSULE | Refills: 0 | Status: DISCONTINUED | OUTPATIENT
Start: 2024-05-26 | End: 2024-05-24

## 2024-05-24 RX ORDER — PROCHLORPERAZINE 25 MG
25 SUPPOSITORY, RECTAL RECTAL EVERY 12 HOURS PRN
Status: DISCONTINUED | OUTPATIENT
Start: 2024-05-24 | End: 2024-05-25

## 2024-05-24 RX ORDER — CITRIC ACID/SODIUM CITRATE 334-500MG
30 SOLUTION, ORAL ORAL
Status: DISCONTINUED | OUTPATIENT
Start: 2024-05-24 | End: 2024-05-25

## 2024-05-24 RX ORDER — CEFAZOLIN SODIUM/WATER 3 G/30 ML
3 SYRINGE (ML) INTRAVENOUS
Status: COMPLETED | OUTPATIENT
Start: 2024-05-24 | End: 2024-05-24

## 2024-05-24 RX ORDER — MISOPROSTOL 200 UG/1
400 TABLET ORAL
Status: DISCONTINUED | OUTPATIENT
Start: 2024-05-24 | End: 2024-05-25

## 2024-05-24 RX ORDER — PROCHLORPERAZINE MALEATE 10 MG
10 TABLET ORAL EVERY 6 HOURS PRN
Status: DISCONTINUED | OUTPATIENT
Start: 2024-05-24 | End: 2024-05-24 | Stop reason: HOSPADM

## 2024-05-24 RX ORDER — MISOPROSTOL 200 UG/1
800 TABLET ORAL
Status: DISCONTINUED | OUTPATIENT
Start: 2024-05-24 | End: 2024-05-25

## 2024-05-24 RX ORDER — MAGNESIUM HYDROXIDE/ALUMINUM HYDROXICE/SIMETHICONE 120; 1200; 1200 MG/30ML; MG/30ML; MG/30ML
30 SUSPENSION ORAL
Status: DISCONTINUED | OUTPATIENT
Start: 2024-05-24 | End: 2024-05-24 | Stop reason: HOSPADM

## 2024-05-24 RX ORDER — AZITHROMYCIN 500 MG/1
1000 TABLET, FILM COATED ORAL ONCE
Qty: 2 TABLET | Refills: 0 | Status: COMPLETED | OUTPATIENT
Start: 2024-05-24 | End: 2024-05-24

## 2024-05-24 RX ORDER — OXYTOCIN/0.9 % SODIUM CHLORIDE 30/500 ML
PLASTIC BAG, INJECTION (ML) INTRAVENOUS CONTINUOUS PRN
Status: DISCONTINUED | OUTPATIENT
Start: 2024-05-24 | End: 2024-05-24

## 2024-05-24 RX ORDER — CARBOPROST TROMETHAMINE 250 UG/ML
250 INJECTION, SOLUTION INTRAMUSCULAR
Status: DISCONTINUED | OUTPATIENT
Start: 2024-05-24 | End: 2024-05-25

## 2024-05-24 RX ORDER — MORPHINE SULFATE 1 MG/ML
INJECTION, SOLUTION EPIDURAL; INTRATHECAL; INTRAVENOUS
Status: COMPLETED | OUTPATIENT
Start: 2024-05-24 | End: 2024-05-24

## 2024-05-24 RX ORDER — ONDANSETRON 4 MG/1
4 TABLET, ORALLY DISINTEGRATING ORAL EVERY 6 HOURS PRN
Status: DISCONTINUED | OUTPATIENT
Start: 2024-05-24 | End: 2024-05-24 | Stop reason: HOSPADM

## 2024-05-24 RX ORDER — OXYTOCIN/0.9 % SODIUM CHLORIDE 30/500 ML
340 PLASTIC BAG, INJECTION (ML) INTRAVENOUS CONTINUOUS PRN
Status: DISCONTINUED | OUTPATIENT
Start: 2024-05-24 | End: 2024-05-25

## 2024-05-24 RX ORDER — METOCLOPRAMIDE HYDROCHLORIDE 5 MG/ML
10 INJECTION INTRAMUSCULAR; INTRAVENOUS EVERY 6 HOURS PRN
Status: DISCONTINUED | OUTPATIENT
Start: 2024-05-24 | End: 2024-05-25

## 2024-05-24 RX ORDER — POLYETHYLENE GLYCOL 3350 17 G/17G
17 POWDER, FOR SOLUTION ORAL DAILY
Status: DISCONTINUED | OUTPATIENT
Start: 2024-05-24 | End: 2024-05-25

## 2024-05-24 RX ORDER — CITRIC ACID/SODIUM CITRATE 334-500MG
30 SOLUTION, ORAL ORAL
Status: COMPLETED | OUTPATIENT
Start: 2024-05-24 | End: 2024-05-24

## 2024-05-24 RX ORDER — AMPICILLIN 2 G/1
2 INJECTION, POWDER, FOR SOLUTION INTRAVENOUS EVERY 6 HOURS
Status: DISCONTINUED | OUTPATIENT
Start: 2024-05-24 | End: 2024-05-24

## 2024-05-24 RX ORDER — PROCHLORPERAZINE 25 MG
25 SUPPOSITORY, RECTAL RECTAL EVERY 12 HOURS PRN
Status: DISCONTINUED | OUTPATIENT
Start: 2024-05-24 | End: 2024-05-24 | Stop reason: HOSPADM

## 2024-05-24 RX ORDER — METOCLOPRAMIDE 10 MG/1
10 TABLET ORAL EVERY 6 HOURS PRN
Status: DISCONTINUED | OUTPATIENT
Start: 2024-05-24 | End: 2024-05-24 | Stop reason: HOSPADM

## 2024-05-24 RX ORDER — LOPERAMIDE HCL 2 MG
2 CAPSULE ORAL
Status: DISCONTINUED | OUTPATIENT
Start: 2024-05-24 | End: 2024-05-25

## 2024-05-24 RX ORDER — SODIUM CHLORIDE, SODIUM LACTATE, POTASSIUM CHLORIDE, CALCIUM CHLORIDE 600; 310; 30; 20 MG/100ML; MG/100ML; MG/100ML; MG/100ML
INJECTION, SOLUTION INTRAVENOUS
Status: COMPLETED
Start: 2024-05-24 | End: 2024-05-24

## 2024-05-24 RX ORDER — OXYTOCIN 10 [USP'U]/ML
10 INJECTION, SOLUTION INTRAMUSCULAR; INTRAVENOUS
Status: DISCONTINUED | OUTPATIENT
Start: 2024-05-24 | End: 2024-05-25

## 2024-05-24 RX ORDER — DIPHENHYDRAMINE HCL 25 MG
25 CAPSULE ORAL EVERY 6 HOURS PRN
Status: DISCONTINUED | OUTPATIENT
Start: 2024-05-24 | End: 2024-05-25

## 2024-05-24 RX ORDER — OXYTOCIN/0.9 % SODIUM CHLORIDE 30/500 ML
100-340 PLASTIC BAG, INJECTION (ML) INTRAVENOUS CONTINUOUS PRN
Status: DISCONTINUED | OUTPATIENT
Start: 2024-05-24 | End: 2024-05-25

## 2024-05-24 RX ORDER — BETAMETHASONE SODIUM PHOSPHATE AND BETAMETHASONE ACETATE 3; 3 MG/ML; MG/ML
12 INJECTION, SUSPENSION INTRA-ARTICULAR; INTRALESIONAL; INTRAMUSCULAR; SOFT TISSUE EVERY 24 HOURS
Qty: 4 ML | Refills: 0 | Status: DISCONTINUED | OUTPATIENT
Start: 2024-05-24 | End: 2024-05-24

## 2024-05-24 RX ORDER — BETAMETHASONE SODIUM PHOSPHATE AND BETAMETHASONE ACETATE 3; 3 MG/ML; MG/ML
12 INJECTION, SUSPENSION INTRA-ARTICULAR; INTRALESIONAL; INTRAMUSCULAR; SOFT TISSUE EVERY 24 HOURS
Qty: 4 ML | Refills: 0 | Status: DISCONTINUED | OUTPATIENT
Start: 2024-05-24 | End: 2024-05-24 | Stop reason: HOSPADM

## 2024-05-24 RX ORDER — ACETAMINOPHEN 325 MG/1
650 TABLET ORAL EVERY 4 HOURS PRN
Status: DISCONTINUED | OUTPATIENT
Start: 2024-05-24 | End: 2024-05-24 | Stop reason: ALTCHOICE

## 2024-05-24 RX ORDER — METOCLOPRAMIDE 10 MG/1
10 TABLET ORAL EVERY 6 HOURS PRN
Status: DISCONTINUED | OUTPATIENT
Start: 2024-05-24 | End: 2024-05-25

## 2024-05-24 RX ORDER — SIMETHICONE 80 MG
160 TABLET,CHEWABLE ORAL EVERY 4 HOURS PRN
Status: DISCONTINUED | OUTPATIENT
Start: 2024-05-24 | End: 2024-05-25

## 2024-05-24 RX ORDER — HYDROXYZINE HYDROCHLORIDE 50 MG/1
50-100 TABLET, FILM COATED ORAL EVERY 6 HOURS PRN
Status: DISCONTINUED | OUTPATIENT
Start: 2024-05-24 | End: 2024-05-24 | Stop reason: HOSPADM

## 2024-05-24 RX ORDER — BUPIVACAINE HYDROCHLORIDE 7.5 MG/ML
INJECTION, SOLUTION INTRASPINAL
Status: COMPLETED | OUTPATIENT
Start: 2024-05-24 | End: 2024-05-24

## 2024-05-24 RX ORDER — FENTANYL CITRATE 50 UG/ML
INJECTION, SOLUTION INTRAMUSCULAR; INTRAVENOUS
Status: COMPLETED | OUTPATIENT
Start: 2024-05-24 | End: 2024-05-24

## 2024-05-24 RX ORDER — MAGNESIUM HYDROXIDE/ALUMINUM HYDROXICE/SIMETHICONE 120; 1200; 1200 MG/30ML; MG/30ML; MG/30ML
30 SUSPENSION ORAL
Status: DISCONTINUED | OUTPATIENT
Start: 2024-05-24 | End: 2024-05-25

## 2024-05-24 RX ORDER — PROCHLORPERAZINE MALEATE 10 MG
10 TABLET ORAL EVERY 6 HOURS PRN
Status: DISCONTINUED | OUTPATIENT
Start: 2024-05-24 | End: 2024-05-25

## 2024-05-24 RX ORDER — DIPHENHYDRAMINE HYDROCHLORIDE 50 MG/ML
25 INJECTION INTRAMUSCULAR; INTRAVENOUS EVERY 6 HOURS PRN
Status: DISCONTINUED | OUTPATIENT
Start: 2024-05-24 | End: 2024-05-25

## 2024-05-24 RX ORDER — LIDOCAINE 40 MG/G
CREAM TOPICAL
Status: DISCONTINUED | OUTPATIENT
Start: 2024-05-24 | End: 2024-05-24 | Stop reason: HOSPADM

## 2024-05-24 RX ORDER — SODIUM CHLORIDE, SODIUM LACTATE, POTASSIUM CHLORIDE, CALCIUM CHLORIDE 600; 310; 30; 20 MG/100ML; MG/100ML; MG/100ML; MG/100ML
INJECTION, SOLUTION INTRAVENOUS CONTINUOUS
Status: DISCONTINUED | OUTPATIENT
Start: 2024-05-25 | End: 2024-05-24 | Stop reason: HOSPADM

## 2024-05-24 RX ORDER — ONDANSETRON 2 MG/ML
INJECTION INTRAMUSCULAR; INTRAVENOUS PRN
Status: DISCONTINUED | OUTPATIENT
Start: 2024-05-24 | End: 2024-05-24

## 2024-05-24 RX ORDER — AMOXICILLIN 250 MG
1 CAPSULE ORAL 2 TIMES DAILY
Status: DISCONTINUED | OUTPATIENT
Start: 2024-05-24 | End: 2024-05-25

## 2024-05-24 RX ORDER — ONDANSETRON 2 MG/ML
4 INJECTION INTRAMUSCULAR; INTRAVENOUS EVERY 6 HOURS PRN
Status: DISCONTINUED | OUTPATIENT
Start: 2024-05-24 | End: 2024-05-24 | Stop reason: HOSPADM

## 2024-05-24 RX ORDER — ACETAMINOPHEN 325 MG/1
650 TABLET ORAL EVERY 4 HOURS PRN
Status: DISCONTINUED | OUTPATIENT
Start: 2024-05-24 | End: 2024-05-25

## 2024-05-24 RX ORDER — METHYLERGONOVINE MALEATE 0.2 MG/ML
200 INJECTION INTRAVENOUS
Status: DISCONTINUED | OUTPATIENT
Start: 2024-05-24 | End: 2024-05-25

## 2024-05-24 RX ORDER — BETAMETHASONE SODIUM PHOSPHATE AND BETAMETHASONE ACETATE 3; 3 MG/ML; MG/ML
12 INJECTION, SUSPENSION INTRA-ARTICULAR; INTRALESIONAL; INTRAMUSCULAR; SOFT TISSUE EVERY 24 HOURS
Status: DISCONTINUED | OUTPATIENT
Start: 2024-05-25 | End: 2024-05-25

## 2024-05-24 RX ORDER — TRANEXAMIC ACID 10 MG/ML
1 INJECTION, SOLUTION INTRAVENOUS EVERY 30 MIN PRN
Status: DISCONTINUED | OUTPATIENT
Start: 2024-05-24 | End: 2024-05-25

## 2024-05-24 RX ORDER — CEFAZOLIN SODIUM/WATER 3 G/30 ML
3 SYRINGE (ML) INTRAVENOUS SEE ADMIN INSTRUCTIONS
Status: DISCONTINUED | OUTPATIENT
Start: 2024-05-24 | End: 2024-05-25

## 2024-05-24 RX ORDER — SODIUM CHLORIDE, SODIUM LACTATE, POTASSIUM CHLORIDE, CALCIUM CHLORIDE 600; 310; 30; 20 MG/100ML; MG/100ML; MG/100ML; MG/100ML
INJECTION, SOLUTION INTRAVENOUS CONTINUOUS
Status: DISCONTINUED | OUTPATIENT
Start: 2024-05-24 | End: 2024-05-24 | Stop reason: HOSPADM

## 2024-05-24 RX ORDER — ACETAMINOPHEN 325 MG/1
975 TABLET ORAL ONCE
Status: COMPLETED | OUTPATIENT
Start: 2024-05-24 | End: 2024-05-24

## 2024-05-24 RX ORDER — SODIUM CHLORIDE, SODIUM LACTATE, POTASSIUM CHLORIDE, CALCIUM CHLORIDE 600; 310; 30; 20 MG/100ML; MG/100ML; MG/100ML; MG/100ML
INJECTION, SOLUTION INTRAVENOUS CONTINUOUS PRN
Status: DISCONTINUED | OUTPATIENT
Start: 2024-05-24 | End: 2024-05-24

## 2024-05-24 RX ORDER — HYDROXYZINE HYDROCHLORIDE 50 MG/1
50 TABLET, FILM COATED ORAL EVERY 6 HOURS PRN
Status: DISCONTINUED | OUTPATIENT
Start: 2024-05-24 | End: 2024-05-24 | Stop reason: HOSPADM

## 2024-05-24 RX ORDER — AMOXICILLIN 250 MG
2 CAPSULE ORAL 2 TIMES DAILY
Status: DISCONTINUED | OUTPATIENT
Start: 2024-05-24 | End: 2024-05-25

## 2024-05-24 RX ORDER — TERBUTALINE SULFATE 1 MG/ML
0.25 INJECTION, SOLUTION SUBCUTANEOUS
Status: DISCONTINUED | OUTPATIENT
Start: 2024-05-24 | End: 2024-05-24 | Stop reason: HOSPADM

## 2024-05-24 RX ORDER — PANTOPRAZOLE SODIUM 40 MG/1
40 TABLET, DELAYED RELEASE ORAL
Status: DISCONTINUED | OUTPATIENT
Start: 2024-05-24 | End: 2024-05-25

## 2024-05-24 RX ORDER — AZITHROMYCIN 500 MG/5ML
500 INJECTION, POWDER, LYOPHILIZED, FOR SOLUTION INTRAVENOUS
Status: COMPLETED | OUTPATIENT
Start: 2024-05-24 | End: 2024-05-24

## 2024-05-24 RX ORDER — LOPERAMIDE HCL 2 MG
4 CAPSULE ORAL
Status: DISCONTINUED | OUTPATIENT
Start: 2024-05-24 | End: 2024-05-25

## 2024-05-24 RX ADMIN — Medication 600 ML/HR: at 21:45

## 2024-05-24 RX ADMIN — PHENYLEPHRINE HYDROCHLORIDE 50 MCG/MIN: 10 INJECTION INTRAVENOUS at 21:14

## 2024-05-24 RX ADMIN — SODIUM CHLORIDE, POTASSIUM CHLORIDE, SODIUM LACTATE AND CALCIUM CHLORIDE: 600; 310; 30; 20 INJECTION, SOLUTION INTRAVENOUS at 20:50

## 2024-05-24 RX ADMIN — BUPIVACAINE HYDROCHLORIDE 20 ML: 2.5 INJECTION, SOLUTION EPIDURAL; INFILTRATION; INTRACAUDAL at 22:38

## 2024-05-24 RX ADMIN — Medication 3 G: at 21:14

## 2024-05-24 RX ADMIN — PHENYLEPHRINE HYDROCHLORIDE 100 MCG: 10 INJECTION INTRAVENOUS at 21:56

## 2024-05-24 RX ADMIN — ACETAMINOPHEN 975 MG: 325 TABLET, FILM COATED ORAL at 20:47

## 2024-05-24 RX ADMIN — PHENYLEPHRINE HYDROCHLORIDE 100 MCG: 10 INJECTION INTRAVENOUS at 21:55

## 2024-05-24 RX ADMIN — BUPIVACAINE HYDROCHLORIDE IN DEXTROSE 1.8 ML: 7.5 INJECTION, SOLUTION SUBARACHNOID at 21:20

## 2024-05-24 RX ADMIN — AMPICILLIN SODIUM 2 G: 2 INJECTION, POWDER, FOR SOLUTION INTRAMUSCULAR; INTRAVENOUS at 14:01

## 2024-05-24 RX ADMIN — BETAMETHASONE SODIUM PHOSPHATE AND BETAMETHASONE ACETATE 12 MG: 3; 3 INJECTION, SUSPENSION INTRA-ARTICULAR; INTRALESIONAL; INTRAMUSCULAR at 08:03

## 2024-05-24 RX ADMIN — AMPICILLIN SODIUM 2 G: 2 INJECTION, POWDER, FOR SOLUTION INTRAMUSCULAR; INTRAVENOUS at 07:51

## 2024-05-24 RX ADMIN — AZITHROMYCIN DIHYDRATE 1000 MG: 500 TABLET ORAL at 07:49

## 2024-05-24 RX ADMIN — SODIUM CHLORIDE, POTASSIUM CHLORIDE, SODIUM LACTATE AND CALCIUM CHLORIDE: 600; 310; 30; 20 INJECTION, SOLUTION INTRAVENOUS at 16:11

## 2024-05-24 RX ADMIN — BUPIVACAINE 20 ML: 13.3 INJECTION, SUSPENSION, LIPOSOMAL INFILTRATION at 22:38

## 2024-05-24 RX ADMIN — SODIUM CITRATE AND CITRIC ACID MONOHYDRATE 30 ML: 500; 334 SOLUTION ORAL at 20:47

## 2024-05-24 RX ADMIN — MORPHINE SULFATE 0.15 MG: 1 INJECTION EPIDURAL; INTRATHECAL; INTRAVENOUS at 21:20

## 2024-05-24 RX ADMIN — SODIUM CHLORIDE, POTASSIUM CHLORIDE, SODIUM LACTATE AND CALCIUM CHLORIDE: 600; 310; 30; 20 INJECTION, SOLUTION INTRAVENOUS at 08:30

## 2024-05-24 RX ADMIN — FENTANYL CITRATE 15 MCG: 50 INJECTION INTRAMUSCULAR; INTRAVENOUS at 21:20

## 2024-05-24 RX ADMIN — SODIUM CHLORIDE, POTASSIUM CHLORIDE, SODIUM LACTATE AND CALCIUM CHLORIDE: 600; 310; 30; 20 INJECTION, SOLUTION INTRAVENOUS at 14:03

## 2024-05-24 RX ADMIN — ONDANSETRON 4 MG: 2 INJECTION INTRAMUSCULAR; INTRAVENOUS at 21:24

## 2024-05-24 RX ADMIN — Medication 500 MG: at 21:14

## 2024-05-24 ASSESSMENT — ACTIVITIES OF DAILY LIVING (ADL)
ADLS_ACUITY_SCORE: 20

## 2024-05-24 NOTE — CARE PLAN
Data: Patient presented to [facility]: 2024  5:07 AM.  Reason for maternal/fetal assessment is leaking vaginal fluid. Patient reports ruptured BOW at home at 0400. Patient denies vaginal bleeding, pelvic pressure, nausea, vomiting, headache, visual disturbances, epigastric or RUQ pain, significant edema. Patient reports fetal movement is normal. Patient is a 33w4d . Prenatal record reviewed. Pregnancy has been complicated by obesity (pre-pregnancy BMI >=35). Support person is present.     Fetal HR baseline was 145, variability is moderate (amplitude range 6 to 25 bpm). Accelerations: increase 15 bpm above baseline lasting 15 seconds. Decelerations: absent. Cervix is closed. Cerclage intact. Fetal presentation cephalic per  ultrasound . Membranes: vaginal discharges visible, ROM+ and fern test done with positive result.     Vital signs wnl. Patient reports pain and is coping.     Action: Verbal consent for EFM. Triage assessment completed. Patient does not meet criteria for early labor discharge.     Response: Patient verbalized agreement with plan. Will contact Provider with update and for further orders.   Admitted to room 77, accompanied by significant other, Dm and mother -in-law, Marina.  Report given to JORDAN Brown at around 0711.

## 2024-05-24 NOTE — PROVIDER NOTIFICATION
05/24/24 1000   Provider Notification   Provider Name/Title Dr. Catherine   Method of Notification In Department   Notification Reason Status Update     Patient requesting to eat. C/o cramping post cerclage removal. Crewe readjusted, difficult to assess on patient. Plan for bedside ultrasound for fetal position.

## 2024-05-24 NOTE — PLAN OF CARE
Goal Outcome Evaluation: no change      Cerclage removed in patient room by Dr. Catherine at 0845. Patient tolerated the procedure well. Patient is c/o cramping post procedure. Discussed when to call the nurse for increased pain or if the cramping is closer together or constant. Patient verbalized understanding. Plan: Continuous monitoring and clear liquids at this time to assess for any changes in labor/fetal status post cerclage removal

## 2024-05-24 NOTE — PROGRESS NOTES
Brief MFM Progress Note    FHT overall reassuring with moderate variability and accels but now with another prolonged deceleration (2 total today with intermittent variable decels). Although no imminent indication for delivery, reviewed CS consent in case this becomes indicated.     Discussed risks and benefits of procedure, including but not limited to bleeding, infection, injury to surrounding organs (vagina, cervix, uterus, ovaries, fallopian tubes, bowel, bladder, ureters, blood vessels and nerves of the pelvis), and injury to infant. Patient had time to ask questions and expressed understanding of procedure and associated risks. Agreed to blood transfusion if necessary. Consent form signed.    Shanthi Lees MD, PGY-3  1:45 PM  [facility] Obstetrics & Gynecology Residency

## 2024-05-24 NOTE — PLAN OF CARE
Labor Shift Note  Data: Contraction frequency q 2-7 minutes and irregular, palpate mild, moderate, and cramping. Fetal assessment category two.   Vitals:    05/24/24 0745 05/24/24 0900 05/24/24 1100 05/24/24 1505   BP: 108/57  105/58 115/59   BP Location:   Right arm Right arm   Patient Position:   Semi-Armstrong's Semi-Armstrong's   Cuff Size:   Adult Large Adult Large   Resp: 18  16 16   Temp: 97.8  F (36.6  C) 97.8  F (36.6  C) 97.9  F (36.6  C) 98.2  F (36.8  C)   TempSrc: Oral  Oral Oral   . No intake/output data recorded..  Leaking small amounts of clear fluid.  Signs and symptoms of infection absent.  Blood pressures WNL. Signs and symptoms of pre-eclampsia: absent, none.  Support person significant other and family present.  Interventions: Continue uterine/fetal assessment continuously. Vital Signs per order set. Comfort measures support persons, fan and hot packs.  Medicated for none.  Plan: Anticipate c/s due to intermittent category II FHT. Provide labor/coping assistance as needed by patient and support person.  Observe for and notify care provider of indications of progressing labor, need for pain medications,  or signs of fetal/maternal compromise.

## 2024-05-24 NOTE — PROGRESS NOTES
Brief MFM Progress Note    Patient is 27 yo  at 33w4d admitted with PPROM. Called to bedside for new intermittent variable decelerations on FHT and report of more frequent/intense contractions.    Patient appears comfortable, agrees to SSE and BSUS.    SSE with ongoing leakage of clear fluid. Cervix appears visually dilated 1-2 cm, apparently stable from prior SSE at the time of cerclage removal (~0900 this morning).    BSUS with cephalic presentation. No color flow appreciated between fetal vertex and area of the internal cervical os that would be suggestive of umbilical cord (not concerning for funic presentation).    FHT  Baseline 135, moderate variability, accels present, variable decels x3 and late decel x1  TOCO: 2 contractions in 10 minutes    While no appreciable progress into  labor or funic presentation/cord prolapse is noted at this time for this PPROM patient, given FHT changes (new intermittent decels), will keep on continuous monitoring and remain NPO for now with mIVF.    Discussed with Dr. Catherine.    Shanthi Lees MD, PGY-3  12:25 PM  [facility] Obstetrics & Gynecology Residency

## 2024-05-24 NOTE — PROGRESS NOTES
"Brief [facility] Progress Note    [id number]: In to reassess patient given recurrent late/prolonged decelerations over the course of the last ~3 hours. Discussed with patient and family concern regarding non-reassuring fetal status. Repeat BSUS again with cephalic presentation and no evidence of funic presentation. SSE/SVE with 1 cm cervical dilation (no progress in spontaneous labor despite increasingly painful contractions throughout the day). Thus, reviewed MFM team recommendation to proceed with  delivery in light of these concerning FHT changes, rather than continue expectant management of PPROM until 34w0d or proceed with induction of labor. Patient expressed understanding of the recommendation and wish to do whatever is safest for baby. FOB expressed concern that the MFM team is \"rushing\" this delivery decision. Patient and family expressed preference to defer decision for approximately one hour. Since FHT still with moderate variability and accels, agreed that this is a reasonable plan.     [id number]: Persistent prolonged/late decels on FHT. Reviewed changes noted throughout the day on FHT with patient, partner, sisters, and mother at bedside, further explaining rationale behind MFM team's recommendation for  delivery. Discussed that continuing to observe and/or induce labor could further stress baby and lead to an emergent  becoming indicated. Patient expressed understanding. FOB expressed concern that he would not be able to cut the umbilical cord in a . Conveyed to family plan to sign out to the oncoming night MFM/OB team, who will regroup regarding the plan as soon as possible.    Shanthi Lees MD, PGY-3  6:57 PM  [facility] Obstetrics & Gynecology Residency    "

## 2024-05-24 NOTE — PROCEDURES
HOSPITAL TRIAGE NOTE  ===================    CHIEF COMPLAINT  ========================  Barry Silverman is a 28 year old patient presenting today at 33w4d for evaluation of R/O ROM.    Patient's last menstrual period was 09/15/2023 (approximate).  Estimated Date of Delivery: 2024       HPI  ==================   Pt reports gush of clear fluid at 04 when up to bathroom  about to take her partner to work.  She has a cerclage in place  noting mild cramping   Prenatal record and labs reviewed from [facility], through [facility] Everywhere.  MD team currently in birth CNM assessed pt   CONTRACTIONS: uterine irritability  ABDOMINAL PAIN: none  FETAL MOVEMENT: active    VAGINAL BLEEDING: none  RUPTURE OF MEMBRANES: appears grossly ruptured clear fluid leaking  pending ROM +,. Rhianna collected   PELVIC PAIN: none    PREGNANCY COMPLICATIONS: History  labor previous pregnancy   -obesity  -cerclage in place currently   -suspected LGA   OB History    Para Term  AB Living   4 1 0 1 2 1   SAB IAB Ectopic Multiple Live Births   1 0 0 0 1      # Outcome Date GA Lbr Chris/2nd Weight Sex Type Anes PTL Lv   4 Current            3 AB 22 18w6d  0.218 kg (7.7 oz)     FD      Name: RICK SILVERMAN,PENDING BABY BARRY FD      Apgar1: 0  Apgar5: 0   2  / 29w3d / 00:07 1.156 kg (2 lb 8.8 oz) F Vag-Spont EPI Y MARIA TERESA      Name: RICK,BABY GIRL      Apgar1: 6  Apgar5: 7   1 SAB  14w0d             # Pain Assessment:      2024     5:27 AM   Current Pain Score   Patient currently in pain? yes   Barry s pain level was assessed and she currently denies pain.        REVIEW OF SYSTEMS  =====================  C: NEGATIVE for fever, chills  I: NEGATIVE for worrisome rashes, moles or lesions  E: NEGATIVE for vision changes or irritation  R: NEGATIVE for significant cough or SOB  CV: NEGATIVE for chest pain, palpitations or varicosities  GI: NEGATIVE for nausea, abdominal pain,  heartburn, or change in bowel habits  : NEGATIVE for frequency, dysuria, or hematuria  M: NEGATIVE for significant arthralgias or myalgia  N: NEGATIVE for headache, weakness, dizziness or paresthesias  P: NEGATIVE for changes in mood or affect    PROBLEM LIST  ===============  Patient Active Problem List    Diagnosis Date Noted    Pregnancy, antepartum, complications 2024     Priority: Medium    Labor and delivery indication for care or intervention 2024     Priority: Medium    Cervical incompetence 2024     Priority: Medium     delivery 2022     Priority: Medium       HISTORIES  ==============  ALLERGIES:    No Known Allergies  PAST MEDICAL HISTORY  Past Medical History:   Diagnosis Date    NO ACTIVE PROBLEMS      SOCIAL HISTORY  Social History     Socioeconomic History    Marital status: Single     Spouse name: Not on file    Number of children: Not on file    Years of education: Not on file    Highest education level: Not on file   Occupational History    Not on file   Tobacco Use    Smoking status: Former     Types: Cigarettes    Smokeless tobacco: Never   Substance and Sexual Activity    Alcohol use: No    Drug use: Never    Sexual activity: Yes     Birth control/protection: Pill   Other Topics Concern    Not on file   Social History Narrative    Not on file     Social Determinants of Health     Financial Resource Strain: Not on File (12/15/2023)    Received from [facility]     Financial Resource Strain     Financial Resource Strain: 0   Food Insecurity: Not at Risk (2024)    Received from [facility]     Food Insecurity     Food: 1   Transportation Needs: Not on File (12/15/2023)    Received from [facility]     Transportation Needs     Transportation: 0   Physical Activity: Not on File (12/15/2023)    Received from [facility]     Physical Activity     Physical Activity: 0   Stress: Not on File (12/15/2023)    Received from [facility]     Stress     Stress: 0   Social  Connections: Not on File (12/15/2023)    Received from [facility]     Social Connections     Social Connections and Isolation: 0   Interpersonal Safety: Not on file   Housing Stability: Not on File (12/15/2023)    Received from [facility]     Housing Stability     Housin     PARTNER: at bedside     FAMILY HISTORY  No family history on file.  OB HISTORY  OB History    Para Term  AB Living   4 1 0 1 2 1   SAB IAB Ectopic Multiple Live Births   1 0 0 0 1      # Outcome Date GA Lbr Chris/2nd Weight Sex Type Anes PTL Lv   4 Current            3 AB 22 18w6d  0.218 kg (7.7 oz)     FD      Name: RICK WATTS,PENDING BABY BARRY FD      Apgar1: 0  Apgar5: 0   2  17 29w3d / 00:07 1.156 kg (2 lb 8.8 oz) F Vag-Spont EPI Y MARIA TERESA      Name: RICK,BABY GIRL      Apgar1: 6  Apgar5: 7   1 2013 14w0d            Prenatal Labs:   Lab Results   Component Value Date    AS Negative 2024    HGB 12.1 2024       ULTRASOUND(s) reviewed: FETAL BIOMETRY  ---------------------------------------------------------------------------------------------------------  Main Fetal Biometry:  BPD                                        80.1                    mm                         32w 1d                Hadlock  OFD                                        105.8                  mm                         31w 3d                 Nicolaides  HC                                          295.4                  mm                          32w 5d                Hadlock  Cerebellum tr                            40.6                   mm                          34w 4d                Nicolaides  AC                                          291.4                  mm                          33w 1d        94%        Hadlock  Femur                                      62.8                   mm                          32w 4d                Hadlock  Fetal Weight Calculation:  EFW                                        2,055                  g                                     90%        Hadlock  EFW (lb,oz)                             4 lb 9                  oz  EFW by                                        Hadlock (BPD-HC-AC-FL)  Head / Face / Neck Biometry:                                             6.1                     mm  CM                                          5.0                     mm       EXAM  ============  /58 (BP Location: Left arm, Patient Position: Semi-Armstrong's, Cuff Size: Adult Large)   Temp 97.9  F (36.6  C) (Oral)   Resp 18   LMP 09/15/2023 (Approximate)   GENERAL APPEARANCE: healthy, alert and no distress  ABDOMEN:  soft, nontender, no epigastric pain  SKIN: no suspicious lesions or rashes  NEURO: Denies headache, blurred vision, other vision changes  PSYCH: mentation appears normal. and affect normal/bright    CONTRACTIONS: uterine irritability   FETAL HEART TONES: continuous EFM- baseline 140 with moderate variability and positive accelerations. No decelerations.  NST: REACTIVE  EFW:4# 9 by US 24     PELVIC EXAM: cerclage stitch noted cx FT/Closed by exam   PRESENTATION: VERTEX  BLOOD: no  DISCHARGE: clear    ROM: clear fluid visible   ROMPLUS: pending    LABS: NOY-7, Type and Screen, CBC with platelets, and GC/ Chlamydia  DIAGNOSIS  ============   27 yo  33w4d [facility] T[facility]  for  labor evaluation- PPROM with cerclage in place     NST: REACTIVE  Fetal Heart rate tracing:category one    PLAN  ============  Transfer care to MD for management of care   ROM + fern pending   GC chlamydia  noted GBS done  after collection    Dr Bird in birth   G3 Dr Meyers notified of pt in triage CNM initial assessment .  OSCAR Salazar CNM

## 2024-05-24 NOTE — H&P
Antepartum History and Physical     May 24, 2024  Barry Silverman  [id number]      HPI     Barry Silverman is a 28 year old  at 33w4d by 6w5d US who presents vaginal leakage of fluid that occurred ~ 0400 this morning.   Denies vaginal bleeding, contractions. Denies decreased fetal movement.     She otherwise is feeling well. She denies headache, vision changes, chest pain, shortness of breath, fever, chills, nausea, vomiting or other systemic complaints.       ROS:  No headaches, vision changes, nausea, vomiting, fevers, chills, chest pain, SOB,  abdominal pain, constipation, diarrhea, dysuria, changes in vaginal discharge or edema in extremities noted.     Her pregnancy has been complicated by:  - Suspected LGA  - Cerclage in place (placed as history indicated cervical cerclage at 14w)   - Obesity     OB History    Para Term  AB Living   4 1 0 1 2 1   SAB IAB Ectopic Multiple Live Births   1 0 0 0 1      # Outcome Date GA Lbr Chris/2nd Weight Sex Type Anes PTL Lv   4 Current            3 AB 22 18w6d  0.218 kg (7.7 oz)     FD      Name: RICK SILVERMAN,PENDING BABY BARRY FD      Apgar1: 0  Apgar5: 0   2  / 29w3d / 00:07 1.156 kg (2 lb 8.8 oz) F Vag-Spont EPI Y MARIA TERESA      Name: RICK,BABY GIRL      Apgar1: 6  Apgar5: 7   1 SAB  14w0d              Past Medical History     Past Medical History:   Diagnosis Date    NO ACTIVE PROBLEMS        Past Surgical History     Past Surgical History:   Procedure Laterality Date    CERCLAGE CERVICAL N/A 2024    Procedure: CERCLAGE, CERVIX, VAGINAL APPROACH;  Surgeon: Starr Watts MD;  Location: [facility]    DILATION AND CURETTAGE SUCTION N/A 2022    Procedure: DILATION AND CURETTAGE, UTERUS, USING SUCTION;  Surgeon: Vika Hopper MD;  Location: [facility]       Medications     Current Facility-Administered Medications   Medication Dose Route Frequency Provider Last Rate Last Admin    acetaminophen (TYLENOL)  tablet 650 mg  650 mg Oral Q4H PRN Selwyn Meyers MD        alum & mag hydroxide-simethicone (MAALOX) suspension 30 mL  30 mL Oral Once PRN Selwyn Meyers MD        [START ON 5/26/2024] amoxicillin (AMOXIL) capsule 250 mg  250 mg Oral TID Selwyn Meyers MD        ampicillin (OMNIPEN) 2 g vial to attach to  mL bag  2 g Intravenous Q6H Selwyn Meyers MD   2 g at 05/24/24 0751    [START ON 5/25/2024] betamethasone acet & sod phos (CELESTONE) injection 12 mg  12 mg Intramuscular Q24H Shanthi Lees MD        diphenhydrAMINE (BENADRYL) capsule 25 mg  25 mg Oral Q6H PRN Selwyn Meyers MD        Or    diphenhydrAMINE (BENADRYL) injection 25 mg  25 mg Intravenous Q6H PRN Selwyn Meyers MD        lactated ringers infusion             metoclopramide (REGLAN) injection 10 mg  10 mg Intravenous Q6H PRN Selwyn Meyers MD        Or    metoclopramide (REGLAN) tablet 10 mg  10 mg Oral Q6H PRN Selwyn Meyers MD        No Tdap Needed - Assessment: Patient does not need Tdap vaccine   Does not apply Continuous PRN Selwyn Meyers MD        ondansetron (ZOFRAN ODT) ODT tab 4 mg  4 mg Oral Q6H PRN Selwyn Meyers MD        Or    ondansetron (ZOFRAN) injection 4 mg  4 mg Intravenous Q6H PRN Selwyn Meyers MD        pantoprazole (PROTONIX) EC tablet 40 mg  40 mg Oral QAM AC Selwyn Meyers MD        polyethylene glycol (MIRALAX) Packet 17 g  17 g Oral Daily Selwyn Meyers MD        prochlorperazine (COMPAZINE) injection 10 mg  10 mg Intravenous Q6H PRN Selwyn Meyers MD        Or    prochlorperazine (COMPAZINE) tablet 10 mg  10 mg Oral Q6H PRN Selwyn Meyers MD        Or    prochlorperazine (COMPAZINE) suppository 25 mg  25 mg Rectal Q12H PRN Selwyn Meyers MD        senna-docusate (SENOKOT-S/PERICOLACE) 8.6-50 MG per tablet 1 tablet  1 tablet Oral BID Selwyn Meyers MD        Or    senna-docusate (SENOKOT-S/PERICOLACE) 8.6-50 MG per tablet 2 tablet  2 tablet Oral BID Selwyn Meyers MD        simethicone (MYLICON) chewable tablet 160  mg  160 mg Oral Q4H PRN Selwyn Meyers MD           Allergies   No Known Allergies    Family History   No family history on file.    Social History     Social History     Socioeconomic History    Marital status: Single   Tobacco Use    Smoking status: Former     Types: Cigarettes    Smokeless tobacco: Never   Substance and Sexual Activity    Alcohol use: No    Drug use: Never    Sexual activity: Yes     Birth control/protection: Pill     Social Determinants of Health     Financial Resource Strain: Not on File (12/15/2023)    Received from [facility]     Financial Resource Strain     Financial Resource Strain: 0   Food Insecurity: Not at Risk (2024)    Received from [facility]     Food Insecurity     Food: 1   Transportation Needs: Not on File (12/15/2023)    Received from [facility]     Transportation Needs     Transportation: 0   Physical Activity: Not on File (12/15/2023)    Received from [facility]     Physical Activity     Physical Activity: 0   Stress: Not on File (12/15/2023)    Received from [facility]     Stress     Stress: 0   Social Connections: Not on File (12/15/2023)    Received from [facility]     Social Connections     Social Connections and Isolation: 0   Housing Stability: Not on File (12/15/2023)    Received from [facility]     Housing Stability     Housin       ROS   10-point ROS negative except as indicated in HPI.    Physical Exam     Vitals:    24 0527   BP: 108/58   BP Location: Left arm   Patient Position: Semi-Armstrong's   Cuff Size: Adult Large   Resp: 18   Temp: 97.9  F (36.6  C)   TempSrc: Oral     General: alert, oriented female, resting in bed in NAD  CV: regular rate and rhythm, normal s1 and s2, no murmurs  Lungs: clear bilaterally, no crackles or wheezes  Abdomen: soft, gravid, non-tender  Extremities: bilateral lower extremities non-tender with trace pedal edema  : SSE: medium graves speculum placed in vagina and cerclage visualized.  Cerclage was grasped  "with ring forceps and removed with Metzenbaum scissors without incident.  Cervix visually FT-1 cm dilated after cerclage removed.     FHT: baseline 140, moderate variability, accelerations present, no decelerations  [place]: irregular pattern  Impression: Reassuring and reactive     Labs     Component      Latest Ref Rng 5/24/2024  7:09 AM   WBC      4.0 - 11.0 10e3/uL 9.9    RBC Count      3.80 - 5.20 10e6/uL 3.76 (L)    Hemoglobin      11.7 - 15.7 g/dL 11.9    Hematocrit      35.0 - 47.0 % 36.0    MCV      78 - 100 fL 96    MCH      26.5 - 33.0 pg 31.6    MCHC      31.5 - 36.5 g/dL 33.1    RDW      10.0 - 15.0 % 13.5    Platelet Count      150 - 450 10e3/uL 353       Legend:  (L) Low    Component      Latest Ref Rng 5/24/2024  6:06 AM 5/24/2024  6:09 AM   Fern Crystallization      No ferning present   Ferning present !    Rupture of Fetal Membranes by ROM Plus      Negative, Invalid, Suggest Repeat  Positive !        Legend:  ! Abnormal    Lab Results   Component Value Date    AS Negative 05/24/2024    CHPCRT  12/09/2015     Negative   Negative for C. trachomatis rRNA by transcription mediated amplification.   A negative result by transcription mediated amplification does not preclude the   presence of C. trachomatis infection because results are dependent on proper   and adequate collection, absence of inhibitors, and sufficient rRNA to be   detected.      GCPCRT  12/09/2015     Negative   Negative for N. gonorrhoeae rRNA by transcription mediated amplification.   A negative result by transcription mediated amplification does not preclude the   presence of N. gonorrhoeae infection because results are dependent on proper   and adequate collection, absence of inhibitors, and sufficient rRNA to be   detected.      HGB 11.9 05/24/2024       GBS Status:   No results found for: \"GBS\"    No results found for: \"PAP\"    Results for orders placed or performed during the hospital encounter of 05/24/24 (from the past 24 " hour(s))   Urine Drug Screen *Canceled*    Narrative    The following orders were created for panel order Urine Drug Screen.  Procedure                               Abnormality         Status                     ---------                               -----------         ------                       Please view results for these tests on the individual orders.   ABO/Rh type and screen    Narrative    The following orders were created for panel order ABO/Rh type and screen.  Procedure                               Abnormality         Status                     ---------                               -----------         ------                     Adult Type and Screen[[id number]]                            Final result                 Please view results for these tests on the individual orders.   Adult Type and Screen   Result Value Ref Range    ABO/RH(D) O POS     Antibody Screen Negative Negative    SPECIMEN EXPIRATION DATE [id number]    Rupture of Fetal Membranes by ROM Plus   Result Value Ref Range    Rupture of Fetal Membranes by ROM Plus Positive (A) Negative, Invalid, Suggest Repeat    Narrative    It is recommended that the tests to detect rupture of the amniotic membranes should not be used without other clinical assessments to make clinical patient management decision.   Fern Test for Rupture of Membranes   Result Value Ref Range    Fern Crystallization Ferning present (A) No ferning present   CBC with platelets   Result Value Ref Range    WBC Count 9.9 4.0 - 11.0 10e3/uL    RBC Count 3.76 (L) 3.80 - 5.20 10e6/uL    Hemoglobin 11.9 11.7 - 15.7 g/dL    Hematocrit 36.0 35.0 - 47.0 %    MCV 96 78 - 100 fL    MCH 31.6 26.5 - 33.0 pg    MCHC 33.1 31.5 - 36.5 g/dL    RDW 13.5 10.0 - 15.0 %    Platelet Count 353 150 - 450 10e3/uL        Imaging     OB ultrasound:   Refer to Imaging tab     Assessment/Plan     28 year old  at 33w4d by 6w5d US, HD#1 admitted for prelabor premature rupture of  membranes.    Prelabor  rupture of membranes   Based on ferning positive, ROM+ positive testing.   - GC/CT cultures collected, GBS collected  - wet prep deferred given prior testing from  negative and patient asymptomatic  - initiating latency antibiotics (D#)   - monitor for signs and symptoms of chorioamnionitis   - NICU consult    Bryson cerclage in place  - Removed at bedside    Fetal Wellbeing  Category 1 tracing, reassuring  - will continue with BID monitoring assuming tracing remains reassuring  - s/p betamethasone #1 at 0803, ; will plan for 2nd dose ~0800,   - cephalic presentation via bedside ultrasound per CN   - monitor for signs and symptoms of fetal distress    Maternal Wellbeing   - routine antepartum cares  - comfort care measures as needed  - regular diet as not imminent risk for delivery  - activity: up ad radha     Mode and timing of delivery   - Discussed delivery at 34 weeks if remains stable and no signs or symptoms of chorioamnionitis and fetal status is reassuring with possibility of continued expectant management until 37 weeks.  Risks, benefits and hopeful outcomes of both strategies discussed and Minda is planning delivery at 34 weeks gestation.   - candidacy for vaginal delivery given current fetal cephalic presentation; therefore, will plan for IOL at 34w with plans to ultrasound prior to starting IOL to affirm continued cephalic presentation  - if fetus is in breech presentation, and delivery is indicated, then will consent for primary  section     DVT Prophylaxis  - SCDs while not ambulating     Patient seen and care plan discussed under supervision of Dr. Bismark Catherine.  Anticipated discharge in >5 Days    Yoana Luis MD  Maternal Fetal Medicine Fellow PGY-5    Physician Attestation   I saw this patient with the resident and agree with the resident/fellow's findings and plan of care as documented in the note.      Key findings: In summary,  Minda Silverman is a  at 33w4d admitted with PPROM in context of known cervical insufficiency.  Cerclage removed without incident today given PPROM.  Plan continued close inpatient monitoring until delivery given PPROM and high risk for  birth.  We reviewed indications and timing of delivery due to PPROM.  Minda expressed understanding and agreement with this plan of care.       65 MINUTES SPENT BY ME on the date of service doing chart review, history, exam, documentation & further activities per the note.    I have personally reviewed the following data over the past 24 hrs:    9.9  \   11.9   / 353     N/A N/A N/A /  N/A   N/A N/A N/A \         Bismark Catherine MD  Date of Service (when I saw the patient): 24

## 2024-05-24 NOTE — CONSULTS
"Consult received for Vascular access care.  See LDA for details.  For additional needs place \"Nursing to Consult for Vascular Access\" [id number] order in EPIC.       "

## 2024-05-25 LAB
CREAT SERPL-MCNC: 0.55 MG/DL (ref 0.51–0.95)
EGFRCR SERPLBLD CKD-EPI 2021: >90 ML/MIN/1.73M2
GP B STREP DNA SPEC QL NAA+PROBE: POSITIVE
HGB BLD-MCNC: 11.2 G/DL (ref 11.7–15.7)

## 2024-05-25 PROCEDURE — 36415 COLL VENOUS BLD VENIPUNCTURE: CPT | Performed by: OBSTETRICS & GYNECOLOGY

## 2024-05-25 PROCEDURE — 250N000013 HC RX MED GY IP 250 OP 250 PS 637

## 2024-05-25 PROCEDURE — 250N000011 HC RX IP 250 OP 636

## 2024-05-25 PROCEDURE — 120N000002 HC R&B MED SURG/OB UMMC

## 2024-05-25 PROCEDURE — 82565 ASSAY OF CREATININE: CPT | Performed by: OBSTETRICS & GYNECOLOGY

## 2024-05-25 PROCEDURE — 85018 HEMOGLOBIN: CPT

## 2024-05-25 PROCEDURE — 99232 SBSQ HOSP IP/OBS MODERATE 35: CPT | Mod: GC | Performed by: OBSTETRICS & GYNECOLOGY

## 2024-05-25 RX ORDER — LIDOCAINE 40 MG/G
CREAM TOPICAL
Status: DISCONTINUED | OUTPATIENT
Start: 2024-05-25 | End: 2024-05-27 | Stop reason: HOSPADM

## 2024-05-25 RX ORDER — IBUPROFEN 800 MG/1
800 TABLET, FILM COATED ORAL EVERY 6 HOURS
Status: DISCONTINUED | OUTPATIENT
Start: 2024-05-26 | End: 2024-05-27 | Stop reason: HOSPADM

## 2024-05-25 RX ORDER — MISOPROSTOL 200 UG/1
800 TABLET ORAL
Status: DISCONTINUED | OUTPATIENT
Start: 2024-05-25 | End: 2024-05-27 | Stop reason: HOSPADM

## 2024-05-25 RX ORDER — AMOXICILLIN 250 MG
1 CAPSULE ORAL 2 TIMES DAILY
Status: DISCONTINUED | OUTPATIENT
Start: 2024-05-25 | End: 2024-05-27 | Stop reason: HOSPADM

## 2024-05-25 RX ORDER — ENOXAPARIN SODIUM 100 MG/ML
40 INJECTION SUBCUTANEOUS EVERY 12 HOURS
Status: DISCONTINUED | OUTPATIENT
Start: 2024-05-25 | End: 2024-05-27 | Stop reason: HOSPADM

## 2024-05-25 RX ORDER — OXYTOCIN/0.9 % SODIUM CHLORIDE 30/500 ML
340 PLASTIC BAG, INJECTION (ML) INTRAVENOUS CONTINUOUS PRN
Status: DISCONTINUED | OUTPATIENT
Start: 2024-05-25 | End: 2024-05-27 | Stop reason: HOSPADM

## 2024-05-25 RX ORDER — METOCLOPRAMIDE HYDROCHLORIDE 5 MG/ML
10 INJECTION INTRAMUSCULAR; INTRAVENOUS EVERY 6 HOURS PRN
Status: DISCONTINUED | OUTPATIENT
Start: 2024-05-25 | End: 2024-05-27 | Stop reason: HOSPADM

## 2024-05-25 RX ORDER — ONDANSETRON 2 MG/ML
4 INJECTION INTRAMUSCULAR; INTRAVENOUS EVERY 6 HOURS PRN
Status: DISCONTINUED | OUTPATIENT
Start: 2024-05-25 | End: 2024-05-27 | Stop reason: HOSPADM

## 2024-05-25 RX ORDER — SIMETHICONE 80 MG
80 TABLET,CHEWABLE ORAL 4 TIMES DAILY PRN
Status: DISCONTINUED | OUTPATIENT
Start: 2024-05-25 | End: 2024-05-26

## 2024-05-25 RX ORDER — METHYLERGONOVINE MALEATE 0.2 MG/ML
200 INJECTION INTRAVENOUS
Status: DISCONTINUED | OUTPATIENT
Start: 2024-05-25 | End: 2024-05-27 | Stop reason: HOSPADM

## 2024-05-25 RX ORDER — METOCLOPRAMIDE 10 MG/1
10 TABLET ORAL EVERY 6 HOURS PRN
Status: DISCONTINUED | OUTPATIENT
Start: 2024-05-25 | End: 2024-05-27 | Stop reason: HOSPADM

## 2024-05-25 RX ORDER — ONDANSETRON 4 MG/1
4 TABLET, ORALLY DISINTEGRATING ORAL EVERY 6 HOURS PRN
Status: DISCONTINUED | OUTPATIENT
Start: 2024-05-25 | End: 2024-05-27 | Stop reason: HOSPADM

## 2024-05-25 RX ORDER — MODIFIED LANOLIN
OINTMENT (GRAM) TOPICAL
Status: DISCONTINUED | OUTPATIENT
Start: 2024-05-25 | End: 2024-05-27 | Stop reason: HOSPADM

## 2024-05-25 RX ORDER — PROCHLORPERAZINE MALEATE 10 MG
10 TABLET ORAL EVERY 6 HOURS PRN
Status: DISCONTINUED | OUTPATIENT
Start: 2024-05-25 | End: 2024-05-27 | Stop reason: HOSPADM

## 2024-05-25 RX ORDER — AMOXICILLIN 250 MG
2 CAPSULE ORAL 2 TIMES DAILY
Status: DISCONTINUED | OUTPATIENT
Start: 2024-05-25 | End: 2024-05-27 | Stop reason: HOSPADM

## 2024-05-25 RX ORDER — CARBOPROST TROMETHAMINE 250 UG/ML
250 INJECTION, SOLUTION INTRAMUSCULAR
Status: DISCONTINUED | OUTPATIENT
Start: 2024-05-25 | End: 2024-05-27 | Stop reason: HOSPADM

## 2024-05-25 RX ORDER — OXYTOCIN 10 [USP'U]/ML
10 INJECTION, SOLUTION INTRAMUSCULAR; INTRAVENOUS
Status: DISCONTINUED | OUTPATIENT
Start: 2024-05-25 | End: 2024-05-27 | Stop reason: HOSPADM

## 2024-05-25 RX ORDER — KETOROLAC TROMETHAMINE 30 MG/ML
30 INJECTION, SOLUTION INTRAMUSCULAR; INTRAVENOUS EVERY 6 HOURS
Status: COMPLETED | OUTPATIENT
Start: 2024-05-25 | End: 2024-05-25

## 2024-05-25 RX ORDER — PROCHLORPERAZINE 25 MG
25 SUPPOSITORY, RECTAL RECTAL EVERY 12 HOURS PRN
Status: DISCONTINUED | OUTPATIENT
Start: 2024-05-25 | End: 2024-05-27 | Stop reason: HOSPADM

## 2024-05-25 RX ORDER — ACETAMINOPHEN 325 MG/1
975 TABLET ORAL EVERY 6 HOURS
Status: DISCONTINUED | OUTPATIENT
Start: 2024-05-25 | End: 2024-05-26

## 2024-05-25 RX ORDER — HYDROCORTISONE 25 MG/G
CREAM TOPICAL 3 TIMES DAILY PRN
Status: DISCONTINUED | OUTPATIENT
Start: 2024-05-25 | End: 2024-05-27 | Stop reason: HOSPADM

## 2024-05-25 RX ORDER — BISACODYL 10 MG
10 SUPPOSITORY, RECTAL RECTAL DAILY PRN
Status: DISCONTINUED | OUTPATIENT
Start: 2024-05-26 | End: 2024-05-27 | Stop reason: HOSPADM

## 2024-05-25 RX ORDER — BUPIVACAINE HYDROCHLORIDE 2.5 MG/ML
INJECTION, SOLUTION EPIDURAL; INFILTRATION; INTRACAUDAL
Status: DISCONTINUED | OUTPATIENT
Start: 2024-05-24 | End: 2024-05-25

## 2024-05-25 RX ORDER — DEXTROSE, SODIUM CHLORIDE, SODIUM LACTATE, POTASSIUM CHLORIDE, AND CALCIUM CHLORIDE 5; .6; .31; .03; .02 G/100ML; G/100ML; G/100ML; G/100ML; G/100ML
INJECTION, SOLUTION INTRAVENOUS CONTINUOUS
Status: DISCONTINUED | OUTPATIENT
Start: 2024-05-25 | End: 2024-05-27 | Stop reason: HOSPADM

## 2024-05-25 RX ORDER — OXYCODONE HYDROCHLORIDE 5 MG/1
5 TABLET ORAL EVERY 4 HOURS PRN
Status: DISCONTINUED | OUTPATIENT
Start: 2024-05-25 | End: 2024-05-27 | Stop reason: HOSPADM

## 2024-05-25 RX ORDER — MISOPROSTOL 200 UG/1
400 TABLET ORAL
Status: DISCONTINUED | OUTPATIENT
Start: 2024-05-25 | End: 2024-05-27 | Stop reason: HOSPADM

## 2024-05-25 RX ORDER — TRANEXAMIC ACID 10 MG/ML
1 INJECTION, SOLUTION INTRAVENOUS EVERY 30 MIN PRN
Status: DISCONTINUED | OUTPATIENT
Start: 2024-05-25 | End: 2024-05-27 | Stop reason: HOSPADM

## 2024-05-25 RX ORDER — LOPERAMIDE HCL 2 MG
4 CAPSULE ORAL
Status: DISCONTINUED | OUTPATIENT
Start: 2024-05-25 | End: 2024-05-27 | Stop reason: HOSPADM

## 2024-05-25 RX ORDER — LOPERAMIDE HCL 2 MG
2 CAPSULE ORAL
Status: DISCONTINUED | OUTPATIENT
Start: 2024-05-25 | End: 2024-05-27 | Stop reason: HOSPADM

## 2024-05-25 RX ADMIN — ACETAMINOPHEN 975 MG: 325 TABLET, FILM COATED ORAL at 15:30

## 2024-05-25 RX ADMIN — KETOROLAC TROMETHAMINE 30 MG: 30 INJECTION, SOLUTION INTRAMUSCULAR at 07:55

## 2024-05-25 RX ADMIN — OXYCODONE HYDROCHLORIDE 5 MG: 5 TABLET ORAL at 11:18

## 2024-05-25 RX ADMIN — KETOROLAC TROMETHAMINE 30 MG: 30 INJECTION, SOLUTION INTRAMUSCULAR at 20:57

## 2024-05-25 RX ADMIN — ENOXAPARIN SODIUM 40 MG: 40 INJECTION SUBCUTANEOUS at 13:47

## 2024-05-25 RX ADMIN — SENNOSIDES AND DOCUSATE SODIUM 2 TABLET: 50; 8.6 TABLET ORAL at 11:17

## 2024-05-25 RX ADMIN — ACETAMINOPHEN 975 MG: 325 TABLET, FILM COATED ORAL at 21:07

## 2024-05-25 RX ADMIN — OXYCODONE HYDROCHLORIDE 5 MG: 5 TABLET ORAL at 15:35

## 2024-05-25 RX ADMIN — ACETAMINOPHEN 975 MG: 325 TABLET, FILM COATED ORAL at 08:45

## 2024-05-25 RX ADMIN — OXYCODONE HYDROCHLORIDE 5 MG: 5 TABLET ORAL at 20:57

## 2024-05-25 RX ADMIN — ACETAMINOPHEN 975 MG: 325 TABLET, FILM COATED ORAL at 01:38

## 2024-05-25 RX ADMIN — KETOROLAC TROMETHAMINE 30 MG: 30 INJECTION, SOLUTION INTRAMUSCULAR at 13:46

## 2024-05-25 RX ADMIN — SENNOSIDES AND DOCUSATE SODIUM 2 TABLET: 50; 8.6 TABLET ORAL at 20:57

## 2024-05-25 ASSESSMENT — ACTIVITIES OF DAILY LIVING (ADL)
ADLS_ACUITY_SCORE: 28
ADLS_ACUITY_SCORE: 21
ADLS_ACUITY_SCORE: 21
ADLS_ACUITY_SCORE: 28
ADLS_ACUITY_SCORE: 28
ADLS_ACUITY_SCORE: 21
ADLS_ACUITY_SCORE: 21
ADLS_ACUITY_SCORE: 28
ADLS_ACUITY_SCORE: 21
ADLS_ACUITY_SCORE: 28
ADLS_ACUITY_SCORE: 21
ADLS_ACUITY_SCORE: 28
ADLS_ACUITY_SCORE: 21
ADLS_ACUITY_SCORE: 21
ADLS_ACUITY_SCORE: 28
ADLS_ACUITY_SCORE: 21
ADLS_ACUITY_SCORE: 28
ADLS_ACUITY_SCORE: 21
ADLS_ACUITY_SCORE: 28
ADLS_ACUITY_SCORE: 21
ADLS_ACUITY_SCORE: 21

## 2024-05-25 NOTE — ANESTHESIA PROCEDURE NOTES
"Intrathecal catheter Procedure Note    Pre-Procedure   Staff -        Anesthesiologist:  Bakari Celeste MD       Resident/Fellow: Andrea Treadwell MD       Performed By: resident       Location: OB       Pre-Anesthestic Checklist: patient identified, IV checked, risks and benefits discussed, informed consent, monitors and equipment checked, pre-op evaluation, at physician/surgeon's request and post-op pain management  Timeout:       Correct Patient: Yes        Correct Procedure: Yes        Correct Site: Yes        Correct Position: Yes   Procedure Documentation  Procedure: intrathecal catheter       Patient Position: sitting       Skin prep: Chloraprep       Insertion Site: L4-5. (midline approach).       Needle Gauge: 25.        Needle Length (Inches): 3.5        Spinal Needle Type: Pencan       Introducer used       Introducer: 20 G       # of attempts: 1 and  # of redirects:  0    Assessment/Narrative         Paresthesias: No.       Sensory Level: T4       CSF fluid: clear.    Medication(s) Administered   0.75% Hyperbaric Bupivacaine (Intrathecal) - Intrathecal   1.8 mL - 5/24/2024 9:20:00 PM  Morphine PF 1 mg/mL (Intrathecal) - Intrathecal   0.15 mg - 5/24/2024 9:20:00 PM  Fentanyl PF (Intrathecal) - Intrathecal   15 mcg - 5/24/2024 9:20:00 PM   Comments:  Attempted spinal with 15cm touhy needle as introducer. Entered intrathecal space with tuohy inadvertently. Threaded catheter immediately. Dosed spinal anesthetics with excellent level bilaterally. Discussed risk for PDPH and options for treatment with patient.       FOR [facility] ONLY:   Pain Team Contact information: please page the Pain Team Via [employer]. Search \"Pain\". During daytime hours, please page the attending first. At night please page the resident first.      "

## 2024-05-25 NOTE — PROGRESS NOTES
[facility]   Post-partum Progress Note    Name:  Minda Silverman  MRN: [id number]    S: Patient is doing well. Last night around midnight she reported an intense left frontal headache. She asked for some pain medication, but when the nurse came back with it, she had fallen asleep. Upon waking, the patient continued to have a 6-7/10 headache; however she last took any pain medication at 2100 last night. After she received pain medication this morning, her headache resolved. Normotensive, no blurry vision, CP, RUQ/epigastric pain, sudden change in edema. Otherwise her abdominal pain is well controlled. Voiding spontaneously. Tolerating regular diet without nausea or vomiting.  Ambulating without without any issues. Has not passed flatus or had a bowel movement yet. Lochia is within expected limits. Planning on pumping for baby in NICU.      O:   Patient Vitals for the past 24 hrs:   BP Temp Temp src Pulse Resp SpO2   24 0137 128/75 98.1  F (36.7  C) Oral 62 17 --   24 2103 108/62 97.9  F (36.6  C) Oral 55 18 --   24 1715 96/58 97.7  F (36.5  C) Oral 56 18 100 %   24 1344 96/56 97.8  F (36.6  C) Oral 55 18 99 %   24 0914 100/61 97.8  F (36.6  C) Oral 54 16 100 %     Gen:  Resting comfortably, NAD  CV:  RR, well perfused  Pulm:  Non-labored breathing on room air  Abd:  Soft, appropriately ttp, non-distended. Fundus unable to palpate given habitus.   Incision:  Clean, dry, intact. No erythema, drainage or induration  Ext:  non-tender, trace edema to bilateral lower extremities    I/O last 3 completed shifts:  In: -   Out: 1100 [Urine:1100]      Assessment/Plan:  Minda Silverman 28 year old  on POD #2 s/p PLTCS for PPROM iso Category II FHT. Beginning to meet goals for discharge home, however has not passed flatus yet. Waiting on ROBF before discharge, likely tomorrow    # Postpartum management  Pain: Well-controlled with  ibuprofen, tylenol, and oxycodone PRN   GI:  Scheduled bowel regimen, simethicone, anti-emetics  : Voiding spontaneously  Hgb: Hgb 11.9 >  ml > 11.2. Asymptomatic from acute blood loss anemia secondary.  Rh: Positive  Feed: Pumping for baby in NICU  Infant:  Stable in NICU   BC: Conversation deferred. Discussed recommended pregnancy spacing of 18 months.  PPx:  Encourage ambulation, IS, SCDs while confined to bed    Medically Ready for Discharge: Anticipated tomorrow.    Plan discussed with Dr. David Alvarez MD  Obstetrics and Gynecology, PGY-1  05/26/2024 8:45 AM     /75 (BP Location: Left arm, Patient Position: Semi-Armstrong's, Cuff Size: Adult Large)   Pulse 62   Temp 98.1  F (36.7  C) (Oral)   Resp 17   LMP 09/15/2023 (Approximate)   SpO2 100%   Breastfeeding Unknown   Hemoglobin   Date Value Ref Range Status   05/25/2024 11.2 (L) 11.7 - 15.7 g/dL Final     The patient was seen and examined by me separately from the team.  I have reviewed and agree with the above note.  She is overall doing well today with no concerns.  Pain is controlled, tolerating a regular diet but no flatus yet.  Continue routine post op care.  Reviewed likely discharge tomorrow.     Rhona Hernandez MD, FACOG

## 2024-05-25 NOTE — PLAN OF CARE
Goal Outcome Evaluation: Stable      Plan of Care Reviewed With: patient, spouse    Overall Patient Progress: improvingOverall Patient Progress: improving         Vitals & PP assessments within normal range. Pain well controled with meds.   Up ad radha & visiting NICU for her infant & Voiding large amounts.   Encouraged to increase water intake.   Continue on plan of cares.

## 2024-05-25 NOTE — DISCHARGE SUMMARY
[facility]   Hospital Discharge Summary    Minda Silverman MRN# [id number]   Age: 28 year old YOB: 1995     Date of Admission:  2024  Date of Discharge::  24  Admitting Physician:  Bismark Catherine MD  Discharge Physician:  Eli Balderrama MD             Admission Diagnoses:     IUP at 33w5d  Suspected LGA infant   Abdominal cerclage           Discharge Diagnosis:     Suspected LGA infant   Abdominal cerclage          Procedures:     Procedure(s): - Primary low transverse    - Epidural anesthesia   - Blood patch                Medications Prior to Admission:     Medications Prior to Admission   Medication Sig Dispense Refill Last Dose    docusate sodium (COLACE) 100 MG capsule Take 1 capsule by mouth twice a day as needed for constipation       ferrous sulfate (FE TABS) 325 (65 Fe) MG EC tablet Take 1 tablet (325 mg) by mouth daily 90 tablet 1     metoclopramide (REGLAN) 10 MG tablet Take 1 tablet (10 mg) by mouth 3 times daily as needed (headache and nausea vomiting) 12 tablet 0     metoclopramide (REGLAN) 5 MG tablet Take 1 tablet (5 mg) by mouth every 6 hours as needed (Headache, nausea) 12 tablet 0     Prenatal Vit-Fe Fumarate-FA (PRENATAL VITAMINS) 28-0.8 MG TABS Take 1 tablet by mouth daily       VITAMIN  B-6 25 MG tablet TAKE ONE TABLET BY MOUTH THREE TIMES DAILY AS NEEDED FOR NAUSEA       [DISCONTINUED] SENNA-docusate sodium (SENNA S) 8.6-50 MG tablet Take 1 tablet by mouth at bedtime 60 tablet 1              Discharge Medications:        Review of your medicines        START taking        Dose / Directions   acetaminophen 325 MG tablet  Commonly known as: TYLENOL  Used for: S/P primary low transverse       Dose: 650 mg  Take 2 tablets (650 mg) by mouth every 6 hours as needed for mild pain Start after Delivery.  Quantity: 100 tablet  Refills: 0     ibuprofen 600 MG tablet  Commonly known as: ADVIL/MOTRIN  Used for: S/P primary low transverse        Dose: 600 mg  Take 1 tablet (600 mg) by mouth every 6 hours as needed for moderate pain Start after delivery  Quantity: 60 tablet  Refills: 0            CONTINUE these medicines which may have CHANGED, or have new prescriptions. If we are uncertain of the size of tablets/capsules you have at home, strength may be listed as something that might have changed.        Dose / Directions   senna-docusate 8.6-50 MG tablet  Commonly known as: SENOKOT-S/PERICOLACE  This may have changed:   when to take this  additional instructions  Used for: S/P primary low transverse       Dose: 1 tablet  Take 1 tablet by mouth daily Start after delivery.  Quantity: 100 tablet  Refills: 0            CONTINUE these medicines which have NOT CHANGED        Dose / Directions   docusate sodium 100 MG capsule  Commonly known as: COLACE      Take 1 capsule by mouth twice a day as needed for constipation  Refills: 0     ferrous sulfate 325 (65 Fe) MG EC tablet  Commonly known as: FE TABS  Used for: Anemia due to blood loss, acute      Dose: 325 mg  Take 1 tablet (325 mg) by mouth daily  Quantity: 90 tablet  Refills: 1     * metoclopramide 10 MG tablet  Commonly known as: REGLAN      Dose: 10 mg  Take 1 tablet (10 mg) by mouth 3 times daily as needed (headache and nausea vomiting)  Quantity: 12 tablet  Refills: 0     * metoclopramide 5 MG tablet  Commonly known as: REGLAN      Dose: 5 mg  Take 1 tablet (5 mg) by mouth every 6 hours as needed (Headache, nausea)  Quantity: 12 tablet  Refills: 0     Prenatal Vitamins 28-0.8 MG Tabs      Dose: 1 tablet  Take 1 tablet by mouth daily  Refills: 0     pyridOXINE 25 MG tablet  Commonly known as: VITAMIN B6      TAKE ONE TABLET BY MOUTH THREE TIMES DAILY AS NEEDED FOR NAUSEA  Refills: 0           * This list has 2 medication(s) that are the same as other medications prescribed for you. Read the directions carefully, and ask your doctor or other care provider to review them with you.                    Where to get your medicines        These medications were sent to [facility] [place], [place] - [address]  [address] [facility] [address], [facility] [address]      Phone: [phone]   acetaminophen 325 MG tablet  ibuprofen 600 MG tablet  senna-docusate 8.6-50 MG tablet               Consultations:   Anesthesiology          Brief History of Admission and Antepartum Course:   Minda Silverman is a 28 year old  at 33w4d by 6w5d US who presents with pre labor premature rupture of membranes that occurred at 0400 on . Patient denied vaginal bleeding but did endorse some mild cramping with normal fetal movements.  On admission patient had a positive ROM plus and ferning test. She was counseled on the risk of laboring with her cerclage in place and also a higher risk of getting an infection with PPROM with a cerclage in place. Her Bryson cerclage was removed intact, on examination her cervix  was visually dilated at 1cm and she was leaking clear vaginal fluid. GC/CT cultures were collected in addition to a GBS swab, a wet prep was not collected as the patient had prior testing from  which was negative and the patient was asymptomatic on admission. She was started on latency antibiotics, NICU was consulted and patient was put on continuous fetal monitoring.Her tracing was a category 1 and reassuring on admission.   Discussed delivery at 34 weeks if she remains stable and fetal status is reassuring with possibility of continued expectant management until 37 weeks. Patient was consented for a  after she had some prolonged deceleration on monitoring. Discussed risk, benefits of  and answered questions. A few hours after admission patient started having more painful contractions with variable decels and late deceleration on fetal monitoring. Another speculum exam was done and the patient was 1 cm dilated and cephalic on US with no evidence of cord  prolapse. Discussed several options with the patient at that time: wait for about an hour to see if labor progresses as baby's tracing had been reassuring for some time then, plan to get a  if the decelerations continue.The patient and her partner decided to wait an hour to see how labor progresses.         Intrapartum Course:   The patient continued to have a category II tracing with persistent decelerations. A  section was recommended. The procedure was uncomplicated.   mL.  See operative report for details.     Findings:   No adhesions  Small amount of clear amniotic fluid  Liveborn male infant in Cephalic presentation. Born at 2144 on 24. Apgars 9 at 1 minute & 9 at 5 minutes. Weight 2220g.  Normal uterus, fallopian tubes, and ovaries.           Postpartum Hospital Course:   The patient's postpartum course was notable for L frontal headache on the evening of POD#1, which resolved following medication administration and blood patch on POD#2. She otherwise recovered as expected without post-operative complications. On discharge, her pain was well controlled. Vaginal bleeding is similar to peak menstrual flow.  Voiding without difficulty.  Ambulating well and tolerating a normal diet.  No fever.  Breastfeeding well.  Infant is stable.  She was discharged on post-partum day #2.    Post-partum hemoglobin: 11.2    Contraception: Deferred, to discuss at 6wk postpartum     Rhogam was not indicated          Discharge Instructions and Follow-Up:     Discharge Instructions:    Marybeth Huston was discharged to home in stable condition with the following instructions/medications:  1) Call for temperature > 100.4, bright red vaginal bleeding >1 pad an hour x 2 hours, foul smelling vaginal discharge, pain not controlled by usual oral pain meds, persistent nausea and vomiting not controlled on medications  2) She desired nothing for contraception.  3) For feeding she decided to continue with  breast .  4) She was instructed to follow-up with her primary OB in 6 weeks for a routine postpartum visit.    PLTCS  Discharge diet: Regular   Discharge activity: No lifting greater than 20 lbs, pushing, pulling, or other strenuous activity for 6 weeks. Pelvic rest for 6 weeks including no sexual intercourse, tampons, or douching. No driving until you can slam on the brakes without pain or while on narcotic pain medications.    Discharge follow-up: Follow up with primary OB for routine postpartum visit in 6 weeks   Wound care: Keep incision clean and dry          Discharge Disposition:     Discharged to home      Medically Ready for Discharge:  after blood patch    Manohar Archibald MD   Obstetrics & Gynecology, PGY-1  05/27/2024 6:51 AM     I have seen, examined, and counseled the patient on the day of discharge. I have reviewed and edited the summary.  Eli Balderrama

## 2024-05-25 NOTE — PLAN OF CARE
Patient arrived to [facility] unit via wheelchair at 0900 ,with belongings, accompanied by spouse/ significant other. Received report from  Vika SAUER RN  and checked bands. Unit and room orientation completd. Call light given; no concerns present at this time. Continue with plan of care.

## 2024-05-25 NOTE — ANESTHESIA PROCEDURE NOTES
TAP Procedure Note    Pre-Procedure   Staff -        Anesthesiologist:  Bakari Celeste MD       Performed By: anesthesiologist       Location: OB       Procedure Start/Stop Times: 5/24/2024 10:38 PM and 5/24/2024 10:44 PM       Pre-Anesthestic Checklist: patient identified, IV checked, site marked, risks and benefits discussed, informed consent, monitors and equipment checked, pre-op evaluation, at physician/surgeon's request and post-op pain management  Timeout:       Correct Patient: Yes        Correct Procedure: Yes        Correct Site: Yes        Correct Position: Yes        Correct Laterality: Yes        Site Marked: Yes  Procedure Documentation  Procedure: TAP       Diagnosis: C/S       Laterality: bilateral       Patient Position: supine       Patient Prep/Sterile Barriers: sterile gloves, mask       Skin prep: Chloraprep       Needle Type: short bevel       Needle Gauge: 21.        Needle Length (millimeters): 110        Ultrasound guided       1. Ultrasound was used to identify targeted nerve, plexus, vascular marker, or fascial plane and place a needle adjacent to it in real-time.       2. Ultrasound was used to visualize the spread of anesthetic in close proximity to the above referenced structure.       3. A permanent image is entered into the patient's record.       4. The visualized anatomic structures appeared normal.       5. There were no apparent abnormal pathologic findings.    Assessment/Narrative         The placement was negative for: blood aspirated, painful injection and site bleeding       Paresthesias: No.       Bolus given via needle..        Secured via.        Insertion/Infusion Method: Single Shot       Complications: none       Injection made incrementally with aspirations every 5 mL.    Medication(s) Administered   Bupivacaine 0.25% PF (Infiltration) - Infiltration   20 mL - 5/24/2024 10:38:00 PM  Bupivacaine liposome (Exparel) 1.3% LA inj susp (Infiltration) - Infiltration   20 mL -  "5/24/2024 10:38:00 PM  Medication Administration Time: 5/24/2024 10:38 PM     Comments:  266mg Exparel injected      FOR [facility] ONLY:   Pain Team Contact information: please page the Pain Team Via [employer]. Search \"Pain\". During daytime hours, please page the attending first. At night please page the resident first.      "

## 2024-05-25 NOTE — OP NOTE
[facility]  Operative Note     Surgery Date:  2024  Surgeon:  Rhona Hernandez MD  Assistants:  Selwyn Meyers MD, Lisa Hopper MD    Pre-op Diagnosis:    - Intrauterine pregnancy at 33w4d  - PPROM  - s/p Bryson Cerclage removal   - Category II fetal heart rate tracing remove from delivery  - BMI 55         Post-op Diagnosis:    - Same   - Liveborn male infant     Procedure:    - Primary low-transverse  section with single layer uterine closure via pfannenstiel incision    Anesthesia:  Spinal  QBL:    264 mL  IVF:    1000 mL crystalloid  UOP:    200 mL clear urine at the end of the case  Drains:   Faust Catheter   Specimens:   Routine cord blood/segment, placenta  Antibiotics:  2g Ancef, 500mg Azithromycin  Additional medications: None  Complications:  None    Indications:   Minda Silverman is a 28 year old  at 33w4d admitted for PPROM. Her cerclage was removed in triage. Latency antibiotics were started and then a category II FHT developed and persisted. A  section was recommended. The risks, benefits, and alternatives of  section were discussed with the patient, and she agreed to proceed.     Findings:   No adhesions  Small amount of clear amniotic fluid  Liveborn male infant in Cephalic presentation. Born at 2144 on 24. Apgars 9 at 1 minute & 9 at 5 minutes. Weight 2220g.  Normal uterus, fallopian tubes, and ovaries.      Latest Reference Range & Units 24 22:05 24 22:28   Ph Cord Arterial 7.16 - 7.39  7.27    PCO2 Cord Arterial 35 - 71 mm Hg 48    PO2 Cord Arterial 3 - 33 mm Hg 24    Bicarbonate Cord Arterial 16 - 24 mmol/L 22    Base Excess/Deficit >-10.0 - -2.0 mmol/L -5.4    Ph Cord Blood Venous 7.21 - 7.45   7.33   PCO2 Cord Venous 27 - 57 mm Hg  47   PO2 Cord Venous 21 - 37 mm Hg  20 (L)   Bicarbonate Cord Venous 16 - 24 mmol/L  25 (H)   Base Excess/Deficit Cord Venous >-10.0 - -2.0 mmol/L  -2.0       Procedure  Details:   The patient was brought to the OR, where adequate regional anesthesia was administered via an intrathecal catheter (see anesthesia note for details). She was placed in the dorsal supine position with a slight leftward tilt. A winters catheter was placed into her bladder. She was prepped and draped in the usual sterile fashion. A surgical time out was performed. A pfannenstiel skin incision was made with the scalpel, and carried down to the underlying fascia. The fascia was incised in the midline, and the incision was extended laterally with the Boles scissors. The superior aspect of the fascia was grasped with the Kocher clamps and dissected off of the underlying rectus muscles with blunt and sharp dissection. Attention was then turned to the inferior aspect of the fascia, which was similarly dissected off of the underlying rectus muscles. The rectus muscles were  in the midline, and the peritoneum was entered bluntly, and the opening was extended with good visualization of the bladder. The bladder blade was placed. A transverse hysterotomy was made with the scalpel in the lower uterine segment, and the incision was extended with digital pressure. The infant was noted to be in the cephalic position, and was delivered atraumatically. The shoulders delivered easily. No nuchal cord was noted. The cord was doubly clamped and cut after 60 seconds, and the infant was handed off to the awaiting NICU team.  A segment of cord was cut and sent to lab. Cord gasses were collected. The placenta was delivered with gentle traction on the umbilical cord and uterine massage. The placenta was sent to pathology. The uterus was exteriorized and cleared of all clots and debris. Uterine tone was noted to be improving with 30 units of pitocin given through the running IV and uterine massage. The hysterotomy was closed with a running locked suture of 0 Vicryl. The hysterotomy was noted to be hemostatic. The posterior  cul-de-sac was cleared of all clots and debris. The uterus was returned to the abdomen. The pericolic gutters were cleared of all clots and debris. The hysterotomy was reexamined and noted to be hemostatic. The fascia and rectus muscles were examined and areas of oozing were controlled with electrocautery. The fascia was closed with a running 0 Looped PDS suture. The subcutaneous tissue was irrigated and areas of oozing were controlled with electrocautery. The subcutaneous tissue was closed with a deep layer of running 3-0 Vicryl suture followed by multiple horizontal mattress sutures. The skin was closed with a running subcuticular 4-0 Monocryl suture and covered with dermabond.    All sponge, needle, and instrument counts were correct. The patient tolerated the procedure well, and was transferred to recovery in stable condition. Dr. Hernandez was present and scrubbed for the procedure.     Selwyn Meyers MD, MPH  Ob/Gyn Resident, PGY-3  24 12:30 AM    I was scrubbed and present for the entire procedure.  I have reviewed and edited the above note.     Rhona Hernandez MD, FACOG    Dr. Hopper's presence was required from the start of the procedure through closure of the fascia for assistance with retraction and visualization in the setting of maternal BMI 55 and a  .      Rhona Hernandez MD, FACOG

## 2024-05-25 NOTE — BRIEF OP NOTE
[facility]    Brief Operative Note    Pre-operative diagnosis: PPROM, Category II FHT  Post-operative diagnosis Same as pre-operative diagnosis    Procedure:  section, N/A - Abdomen    Surgeon: Surgeons and Role:     * Rhona Hernandez MD - Primary     * Lisa Hopper MD - Resident - Assisting     * Selwyn Meyers MD - Resident - Assisting  Anesthesia: Spinal   Estimated Blood Loss: 350 ml    Drains: None  Specimens:   ID Type Source Tests Collected by Time Destination   A :  Tissue Umbilical Cord SEE PROVIDERS ORDERS Rhona Hernandez MD 2024 10:17 PM    B :  Cord blood Umbilical Cord OR DOCUMENTATION ONLY Rhona Hernandez MD 2024 10:18 PM    C :  Cord blood, arterial Umbilical Cord OR DOCUMENTATION ONLY Rhona Hernandez MD 2024 10:18 PM    D :  Cord blood, venous Umbilical Cord OR DOCUMENTATION ONLY Rhona Hernandez MD 2024 10:26 PM      Findings:   Viable male infant delivered at 2144 on 24 with APGARs 9 and 9. Weight pending. Clear amniotic fluid, Placenta sent to pathology. Normal uterus, tubes, and ovaries. No adhesions. Surgical sites noted to be hemostatic and urine noted to be clear at the end of case.    Complications: None.  Implants: * No implants in log *        Selwyn Meyers MD, MPH  Ob/Gyn Resident, PGY-3  24 11:02 PM

## 2024-05-25 NOTE — PROGRESS NOTES
[facility]   Post-partum Progress Note    Name:  Minda Silverman  MRN: [id number]    S: Patient is doing well.  Pain is well controlled. Voiding spontaneously. Tolerating regular diet without nausea or vomiting.  Ambulating without without any issues.  Lochia is within expected limits. Planning on pumping for baby in NICU.      O:   Patient Vitals for the past 24 hrs:   BP Temp Temp src Pulse Resp SpO2   05/25/24 0914 100/61 97.8  F (36.6  C) Oral 54 16 100 %   05/25/24 0634 -- -- -- -- -- 98 %   05/25/24 0629 -- -- -- -- -- 97 %   05/25/24 0624 -- -- -- -- -- 97 %   05/25/24 0619 -- -- -- -- -- 97 %   05/25/24 0614 -- -- -- -- -- 98 %   05/25/24 0609 -- -- -- -- -- 97 %   05/25/24 0604 -- -- -- -- -- 97 %   05/25/24 0559 -- -- -- -- -- 98 %   05/25/24 0554 -- -- -- -- -- 98 %   05/25/24 0549 -- -- -- -- -- 98 %   05/25/24 0544 -- -- -- -- -- 98 %   05/25/24 0539 -- -- -- -- -- 98 %   05/25/24 0534 -- -- -- -- -- 98 %   05/25/24 0529 -- -- -- -- -- 97 %   05/25/24 0524 103/57 98.2  F (36.8  C) -- -- 16 98 %   05/25/24 0519 -- -- -- -- -- 98 %   05/25/24 0514 -- -- -- -- -- 97 %   05/25/24 0509 -- -- -- -- -- 98 %   05/25/24 0504 -- -- -- -- -- 98 %   05/25/24 0459 -- -- -- -- -- 98 %   05/25/24 0454 -- -- -- -- -- 99 %   05/25/24 0449 -- -- -- -- -- 98 %   05/25/24 0444 -- -- -- -- -- 99 %   05/25/24 0439 -- -- -- -- -- 99 %   05/25/24 0434 -- -- -- -- -- 100 %   05/25/24 0429 -- -- -- -- -- 99 %   05/25/24 0426 94/51 98.2  F (36.8  C) Oral -- 20 --   05/25/24 0424 -- -- -- -- -- 100 %   05/25/24 0419 -- -- -- -- -- 99 %   05/25/24 0414 -- -- -- -- -- 100 %   05/25/24 0409 -- -- -- -- -- 100 %   05/25/24 0404 -- -- -- -- -- 100 %   05/25/24 0359 -- -- -- -- -- 99 %   05/25/24 0354 -- -- -- -- -- 100 %   05/25/24 0349 -- -- -- -- -- 99 %   05/25/24 0344 -- -- -- -- -- 100 %   05/25/24 0339 -- -- -- -- -- 100 %   05/25/24 0334 -- -- -- -- -- 100 %   05/25/24 0329 --  -- -- -- -- 100 %   05/25/24 0324 -- -- -- -- -- 100 %   05/25/24 0319 -- -- -- -- -- 100 %   05/25/24 0316 98/57 98.3  F (36.8  C) Oral -- 18 --   05/25/24 0314 -- -- -- -- -- 100 %   05/25/24 0309 -- -- -- -- -- 100 %   05/25/24 0304 -- -- -- -- -- 100 %   05/25/24 0259 -- -- -- -- -- 98 %   05/25/24 0254 -- -- -- -- -- 100 %   05/25/24 0249 -- -- -- -- -- 99 %   05/25/24 0244 -- -- -- -- -- 100 %   05/25/24 0239 -- -- -- -- -- 100 %   05/25/24 0234 -- -- -- -- -- 98 %   05/25/24 0229 -- -- -- -- -- 100 %   05/25/24 0224 -- -- -- -- -- 99 %   05/25/24 0219 -- -- -- -- -- 100 %   05/25/24 0217 107/55 98.2  F (36.8  C) Oral -- -- --   05/25/24 0214 -- -- -- -- -- 99 %   05/25/24 0209 -- -- -- -- -- 99 %   05/25/24 0204 -- -- -- -- -- 98 %   05/25/24 0159 -- -- -- -- -- 98 %   05/25/24 0154 -- -- -- -- -- 98 %   05/25/24 0149 -- -- -- -- -- 98 %   05/25/24 0120 107/53 98.1  F (36.7  C) Oral -- 18 --   05/25/24 0019 -- -- -- 56 -- 100 %   05/25/24 0017 -- -- -- 51 -- 100 %   05/25/24 0016 -- -- -- 52 -- 100 %   05/25/24 0015 -- -- -- (!) 49 -- 98 %   05/25/24 0014 -- -- -- 50 -- 100 %   05/25/24 0013 -- -- -- 54 -- 100 %   05/25/24 0012 -- -- -- 55 -- 100 %   05/25/24 0011 -- -- -- 53 -- 100 %   05/25/24 0010 106/53 98.2  F (36.8  C) Oral 61 -- 100 %   05/25/24 0009 -- -- -- 58 -- 100 %   05/25/24 0008 -- -- -- 51 -- 100 %   05/25/24 0007 -- -- -- 51 -- 100 %   05/25/24 0005 -- -- -- 53 -- 100 %   05/25/24 0004 -- -- -- 54 -- 100 %   05/25/24 0003 -- -- -- 59 -- 100 %   05/25/24 0002 -- -- -- 54 -- 99 %   05/25/24 0001 -- -- -- 52 -- 100 %   05/25/24 0000 -- -- -- 50 -- 100 %   05/24/24 2359 -- -- -- 61 -- 100 %   05/24/24 2358 -- -- -- 53 -- 100 %   05/24/24 2357 -- -- -- 54 -- 100 %   05/24/24 2356 -- -- -- 53 -- 100 %   05/24/24 2355 92/49 -- -- 56 -- --   05/24/24 2354 -- -- -- 54 -- 100 %   05/24/24 2353 -- -- -- 56 -- 100 %   05/24/24 2352 -- -- -- 53 -- 100 %   05/24/24 2351 -- -- -- 55 -- 100 %   05/24/24 2350  -- -- -- -- -- 100 %   05/24/24 2349 -- -- -- 52 -- 100 %   05/24/24 2348 -- -- -- 54 -- 100 %   05/24/24 2347 -- -- -- 53 -- 100 %   05/24/24 2346 -- -- -- (!) 49 -- 100 %   05/24/24 2345 -- -- -- 51 -- 100 %   05/24/24 2344 -- -- -- 56 -- 100 %   05/24/24 2343 -- -- -- 52 -- 100 %   05/24/24 2341 -- -- -- 53 -- 100 %   05/24/24 2340 98/50 -- -- 52 19 100 %   05/24/24 2339 -- -- -- 51 -- 100 %   05/24/24 2338 -- -- -- 51 -- 100 %   05/24/24 2337 -- -- -- (!) 49 16 100 %   05/24/24 2336 -- -- -- 57 -- 100 %   05/24/24 2335 -- -- -- 51 -- 100 %   05/24/24 2334 -- -- -- (!) 49 -- 100 %   05/24/24 2333 -- -- -- 51 -- 100 %   05/24/24 2332 -- -- -- 57 -- 100 %   05/24/24 2331 -- -- -- 57 -- 100 %   05/24/24 2329 -- -- -- 54 -- 100 %   05/24/24 2328 -- -- -- 59 -- 100 %   05/24/24 2327 -- -- -- 58 -- 99 %   05/24/24 2326 -- -- -- 52 -- 99 %   05/24/24 2325 103/47 -- -- 52 -- 100 %   05/24/24 2324 -- -- -- 54 -- 100 %   05/24/24 2323 -- -- -- 59 -- 100 %   05/24/24 2322 -- -- -- 54 -- 100 %   05/24/24 2321 -- -- -- 54 -- 99 %   05/24/24 2320 -- -- -- 54 -- 100 %   05/24/24 2319 -- -- -- 59 -- 100 %   05/24/24 2318 -- -- -- 56 -- 100 %   05/24/24 2316 -- -- -- 53 -- 100 %   05/24/24 2315 -- -- -- 51 -- 100 %   05/24/24 2314 -- -- -- 52 -- 100 %   05/24/24 2313 -- -- -- 57 -- 100 %   05/24/24 2312 -- -- -- 54 -- 100 %   05/24/24 2311 -- -- -- 51 -- 100 %   05/24/24 2310 112/57 -- -- 55 -- 99 %   05/24/24 2309 -- -- -- 53 -- 100 %   05/24/24 2308 -- -- -- -- -- 100 %   05/24/24 2307 111/55 -- -- 53 -- 100 %   05/24/24 2305 -- -- -- 59 -- 100 %   05/24/24 2304 -- -- -- 55 -- 100 %   05/24/24 2303 -- -- -- 63 -- 100 %   05/24/24 2302 -- -- -- 60 -- 100 %   05/24/24 2301 -- -- -- 53 -- 100 %   05/24/24 2300 -- -- -- 57 -- 99 %   05/24/24 2259 -- -- -- -- -- 100 %   05/24/24 2258 -- -- -- 57 -- 100 %   05/24/24 2257 -- -- -- 57 12 100 %   05/24/24 2256 -- -- -- 58 -- 100 %   05/24/24 2255 -- -- -- -- -- 100 %   05/24/24 2254 --  -- -- 57 -- --   24 -- -- -- -- 16 100 %   24 -- -- -- 52 16 99 %   24 -- -- -- 60 20 100 %   24 (!) 95/38 98.2  F (36.8  C) -- 54 -- --   24 1926 107/64 98.2  F (36.8  C) Oral -- 18 --   24 1505 115/59 98.2  F (36.8  C) Oral -- 16 --   24 1100 105/58 97.9  F (36.6  C) Oral -- 16 --     Gen:  Resting comfortably, NAD  CV:  RR, well perfused  Pulm:  Non-labored breathing on room air  Abd:  Soft, appropriately ttp, non-distended. Fundus unable to palpate given habitus.   Incision:  Clean, dry, intact. No erythema, drainage or induration   Ext:  non-tender, trace edema to bilateral lower extremities    I/O last 3 completed shifts:  In: -   Out: 714 [Urine:450; Blood:264]      Assessment/Plan:  Minda Silverman 28 year old  on POD #1 s/p PLTCS. Beginning to meet goals for discharge home    # Postpartum management  Pain: Well-controlled with ibuprofen, tylenol, and oxycodone PRN   GI:  PRN bowel regimen, anti-emetics  : Voiding spontaneously  Hgb: Hgb 11.9 >  ml > 11.2. Asymptomatic from acute blood loss anemia secondary.  Rh: Positive  Feed: Pumping for baby in NICU  Infant:  Stable in NICU   BC: Did not discuss. Discussed recommended pregnancy spacing of 18 months.  PPx:  Encourage ambulation, IS, SCDs while confined to bed    Medically Ready for Discharge: Anticipated in 2-4 Days     I have seen and examined the patient without the resident. I have reviewed, edited, and agree with the note.     Eli Balderrama MD

## 2024-05-25 NOTE — PROVIDER NOTIFICATION
05/25/24 0509   Provider Notification   Provider Name/Title Dr. Meyers   Method of Notification In Department   Notification Reason Other     Provider requested that winters catheter stay in until day shift and then be reevaluated.

## 2024-05-25 NOTE — LACTATION NOTE
Bed: 17  Expected date:   Expected time:   Means of arrival:   Comments:  Triage    This note was copied from a baby's chart.  Lactation Admission Note      Baby Information:  Infant's first name:  Suresh  Infant medical history: Admitted to NICU , 33 + 4 GA     needed? No    Lactation goal (if known): breastfeeding/provide breast milk  Lactation history: Older child born at 29 weeks, reports her milk started to come in then went away. Did not BF or pump.     Mother's Information: Name: Minda  Occupation:    Age: 28  Delivery type:     [place]: [place], [place]  Pump for home use: Reports she ordered a pump through [employer], states it was supposed to be delivered soon. Reviewed recommendation for rental pump to support supply. Will consider calling [employer] after holiday weekend to see if order can be cancelled.     Partner's name:    Occupation:      Relevant maternal medical and social hx:     Maternal past medical history, problem list and prior to admission medications reviewed and unremarkable.      Relevant maternal medications:   N/a    Maternal risk factors:  Surgical birth     Admission Education given:  [x]Admission packet  [x]Kangaroo care  [x]Benefits of breast milk  [x]How breast milk is made  [x]Stages of milk production  [x]Milk supply/ goal volumes  [x]Hand expression  [x]Hands-on pumping  [x]Collecting, labeling, transporting milk  [x]Cleaning, sanitizing pump parts  [x]Storage of milk  []Benefits and use of pasteurized donor human milk  (not addressed during admission visit; sticky note from bedside RN states mother gave assent for DBM but not to discuss in front of FOB and he was present)        Lisa Liu, RN, IBCLC   Lactation Consultant  [place]: [facility] [phone]  Office: [phone]

## 2024-05-25 NOTE — ANESTHESIA CARE TRANSFER NOTE
Patient: Minda Silverman    Procedure: Procedure(s):   section       Diagnosis: * No pre-op diagnosis entered *  Diagnosis Additional Information: No value filed.    Anesthesia Type:   Spinal     Note:    Oropharynx: oropharynx clear of all foreign objects  Level of Consciousness: awake  Oxygen Supplementation: room air      Dentition: dentition unchanged  Vital Signs Stable: post-procedure vital signs reviewed and stable  Report to RN Given: handoff report given  Patient transferred to: Labor and Delivery    Handoff Report: Identifed the Patient, Identified the Reponsible Provider, Reviewed the pertinent medical history, Discussed the surgical course, Reviewed Intra-OP anesthesia mangement and issues during anesthesia, Set expectations for post-procedure period and Allowed opportunity for questions and acknowledgement of understanding      Vitals:  Vitals Value Taken Time   BP 95/38 24 2250   Temp     Pulse 57 24 2253   Resp 11 24 2254   SpO2 100 % 24 2254   Vitals shown include unfiled device data.    Electronically Signed By: OSCAR Powell CRNA  May 24, 2024  10:55 PM

## 2024-05-25 NOTE — PROGRESS NOTES
Intrathecal catheter was removed at 0730 with blue tip intact. OB team can start lovenox 4hrs after removal (9140).     Olivier Whelan MD

## 2024-05-25 NOTE — LACTATION NOTE
This note was copied from a baby's chart.  Brief Lactation Consult    Spoke briefly with mother as LC assisted her to the bedside to visit infant in NICU. Minda shares she has experience pumping for  baby as her older child was born at 24 weeks gestation (in NICU at [facility]). She has initiated pumping but has some questions about establishing supply and expected progress.   Minda requests LC visit later in the day for full admission visit, will call out to [facility] nurse when she is ready.     Lisa Liu RN, IBCLC   Lactation Consultant  [place]: [facility] [phone]  Office: [phone]

## 2024-05-25 NOTE — PLAN OF CARE
Data: Minda Silverman transferred to 7120 via wheelchair at 0900. Baby in the NICU.  Action: Receiving unit notified of transfer: Yes. Patient and family notified of room change. Report given to Rehana HART RN at 0850. Belongings sent to receiving unit. Accompanied by Registered Nurse. Oriented patient to surroundings. Call light within reach.   Response: Patient tolerated transfer and is stable.

## 2024-05-25 NOTE — ANESTHESIA POSTPROCEDURE EVALUATION
Patient: Minda Silverman    Procedure: Procedure(s):   section       Anesthesia Type:  Spinal    Note:  Disposition: Inpatient   Postop Pain Control: Uneventful            Sign Out: Well controlled pain   PONV: No   Neuro/Psych: Uneventful            Sign Out: Acceptable/Baseline neuro status   Airway/Respiratory: Uneventful            Sign Out: Acceptable/Baseline resp. status   CV/Hemodynamics: Uneventful            Sign Out: Acceptable CV status; No obvious hypovolemia; No obvious fluid overload   Other NRE:    DID A NON-ROUTINE EVENT OCCUR? No    Event details/Postop Comments:  Wet tap by resident with epidural needle during second attempt at DPE for c/s.     Given the difficulty of neuraxial technique in the current and previously charted procedures plan was to maintain an intrathecal catheter.  This was dosed and provided an effective block for duration of c/s procedure.     Given the high risk of dural puncture headache in this setting it was discussed with patient the plan to maintain the catheter overnight in hopes to reduce of spinal headache and need for blood patch would would be potentially challenging and may require IR assistance. Patient reported good understanding of this. Discussed with OB providers and charge nurse. Intrathecal catheter was labelled and taped so can't dose it.     OB to likely request removal in order to dose Lovenox tomorrow but feel that overnight would be potentially beneficial to patient. Plan to maintain close follow-up with patient.     Of other note challenging interactions as documented with father of baby. These challenges included requesting no male providers, filming procedure, not staying seated in the operating room next to patient. Father of baby visibly and verbally upset. Writer tried to deescalate the situation.                Last vitals:  Vitals Value Taken Time   /53 24 0010   Temp     Pulse 56 24 0019   Resp 17 24 0019    SpO2 100 % 05/25/24 0019   Vitals shown include unfiled device data.    Electronically Signed By: Bakari Celeste MD  May 25, 2024  12:20 AM

## 2024-05-25 NOTE — PROGRESS NOTES
Data: BP's soft and bradycardic at  times. Provider aware & this is pt. Baseline. Postpartum checks within normal limits - see flow record. Patient eating and drinking normally. Patient has winters catheter and has not been OOB. No apparent signs of infection. Incision site CDI and approximated. Infant in NICU.  Action: Patient medicated during the shift for pain. See MAR. Patient reassessed within 1 hour after medication and pain was improved - patient stated she was comfortable. Patient education done about pumping, mobility, and pain. See flow record.  Response: Positive attachment behaviors observed with infant. Support persons Dm (FOB) present.   Plan: Anticipate discharge on 2-3 days.

## 2024-05-25 NOTE — PLAN OF CARE
Goal Outcome Evaluation: VSS. Postpartum checks WDL. Incision GISELA with liquid bandage, no s/s of infection noted. Up independently, voided x1 after winters removal this morning. Very concentrated urine, encouraged PO intake. Pain managed with tylenol, toradol, and oxycodone. Pumping for baby in the NICU. EDS score of 0.

## 2024-05-26 PROCEDURE — 99232 SBSQ HOSP IP/OBS MODERATE 35: CPT | Mod: GC | Performed by: OBSTETRICS & GYNECOLOGY

## 2024-05-26 PROCEDURE — 250N000013 HC RX MED GY IP 250 OP 250 PS 637

## 2024-05-26 PROCEDURE — 250N000011 HC RX IP 250 OP 636

## 2024-05-26 PROCEDURE — 120N000002 HC R&B MED SURG/OB UMMC

## 2024-05-26 RX ORDER — SIMETHICONE 80 MG
80 TABLET,CHEWABLE ORAL 4 TIMES DAILY
Status: DISCONTINUED | OUTPATIENT
Start: 2024-05-26 | End: 2024-05-27 | Stop reason: HOSPADM

## 2024-05-26 RX ORDER — BUTALBITAL, ACETAMINOPHEN AND CAFFEINE 50; 325; 40 MG/1; MG/1; MG/1
1 TABLET ORAL EVERY 6 HOURS PRN
Status: DISCONTINUED | OUTPATIENT
Start: 2024-05-26 | End: 2024-05-26

## 2024-05-26 RX ORDER — DIPHENHYDRAMINE HCL 25 MG
25 CAPSULE ORAL EVERY 6 HOURS PRN
Status: DISCONTINUED | OUTPATIENT
Start: 2024-05-26 | End: 2024-05-27 | Stop reason: HOSPADM

## 2024-05-26 RX ORDER — BUTALBITAL, ACETAMINOPHEN AND CAFFEINE 50; 325; 40 MG/1; MG/1; MG/1
1 TABLET ORAL EVERY 6 HOURS
Status: DISCONTINUED | OUTPATIENT
Start: 2024-05-27 | End: 2024-05-27 | Stop reason: HOSPADM

## 2024-05-26 RX ORDER — ACETAMINOPHEN 325 MG/1
650 TABLET ORAL EVERY 6 HOURS
Status: DISCONTINUED | OUTPATIENT
Start: 2024-05-26 | End: 2024-05-26

## 2024-05-26 RX ORDER — CYCLOBENZAPRINE HCL 5 MG
5 TABLET ORAL EVERY 8 HOURS PRN
Status: DISCONTINUED | OUTPATIENT
Start: 2024-05-26 | End: 2024-05-27 | Stop reason: HOSPADM

## 2024-05-26 RX ORDER — AMOXICILLIN 250 MG
1 CAPSULE ORAL DAILY
Qty: 100 TABLET | Refills: 0 | Status: SHIPPED | OUTPATIENT
Start: 2024-05-26

## 2024-05-26 RX ORDER — IBUPROFEN 600 MG/1
600 TABLET, FILM COATED ORAL EVERY 6 HOURS PRN
Qty: 60 TABLET | Refills: 0 | Status: SHIPPED | OUTPATIENT
Start: 2024-05-26

## 2024-05-26 RX ORDER — POLYETHYLENE GLYCOL 3350 17 G/17G
17 POWDER, FOR SOLUTION ORAL DAILY
Status: DISCONTINUED | OUTPATIENT
Start: 2024-05-26 | End: 2024-05-27 | Stop reason: HOSPADM

## 2024-05-26 RX ORDER — ACETAMINOPHEN 325 MG/1
650 TABLET ORAL EVERY 6 HOURS PRN
Qty: 100 TABLET | Refills: 0 | Status: SHIPPED | OUTPATIENT
Start: 2024-05-26

## 2024-05-26 RX ADMIN — ENOXAPARIN SODIUM 40 MG: 40 INJECTION SUBCUTANEOUS at 01:40

## 2024-05-26 RX ADMIN — CYCLOBENZAPRINE HYDROCHLORIDE 5 MG: 5 TABLET, FILM COATED ORAL at 23:46

## 2024-05-26 RX ADMIN — SIMETHICONE 80 MG: 80 TABLET, CHEWABLE ORAL at 19:48

## 2024-05-26 RX ADMIN — DIPHENHYDRAMINE HYDROCHLORIDE 25 MG: 25 CAPSULE ORAL at 17:53

## 2024-05-26 RX ADMIN — ACETAMINOPHEN 975 MG: 325 TABLET, FILM COATED ORAL at 07:07

## 2024-05-26 RX ADMIN — ACETAMINOPHEN 975 MG: 325 TABLET, FILM COATED ORAL at 12:52

## 2024-05-26 RX ADMIN — IBUPROFEN 800 MG: 800 TABLET, FILM COATED ORAL at 07:07

## 2024-05-26 RX ADMIN — METOCLOPRAMIDE 10 MG: 10 TABLET ORAL at 19:53

## 2024-05-26 RX ADMIN — ENOXAPARIN SODIUM 40 MG: 40 INJECTION SUBCUTANEOUS at 12:51

## 2024-05-26 RX ADMIN — POLYETHYLENE GLYCOL 3350 17 G: 17 POWDER, FOR SOLUTION ORAL at 17:48

## 2024-05-26 RX ADMIN — SENNOSIDES AND DOCUSATE SODIUM 2 TABLET: 50; 8.6 TABLET ORAL at 08:19

## 2024-05-26 RX ADMIN — SIMETHICONE 80 MG: 80 TABLET, CHEWABLE ORAL at 08:20

## 2024-05-26 RX ADMIN — OXYCODONE HYDROCHLORIDE 5 MG: 5 TABLET ORAL at 12:53

## 2024-05-26 RX ADMIN — SENNOSIDES AND DOCUSATE SODIUM 2 TABLET: 50; 8.6 TABLET ORAL at 19:48

## 2024-05-26 RX ADMIN — BUTALBITAL, ACETAMINOPHEN, AND CAFFEINE 1 TABLET: 50; 325; 40 TABLET ORAL at 23:46

## 2024-05-26 RX ADMIN — ACETAMINOPHEN 650 MG: 325 TABLET, FILM COATED ORAL at 19:48

## 2024-05-26 RX ADMIN — BUTALBITAL, ACETAMINOPHEN, AND CAFFEINE 1 TABLET: 50; 325; 40 TABLET ORAL at 17:54

## 2024-05-26 RX ADMIN — IBUPROFEN 800 MG: 800 TABLET, FILM COATED ORAL at 12:53

## 2024-05-26 RX ADMIN — IBUPROFEN 800 MG: 800 TABLET, FILM COATED ORAL at 19:48

## 2024-05-26 RX ADMIN — SIMETHICONE 80 MG: 80 TABLET, CHEWABLE ORAL at 12:53

## 2024-05-26 RX ADMIN — SIMETHICONE 80 MG: 80 TABLET, CHEWABLE ORAL at 15:35

## 2024-05-26 RX ADMIN — OXYCODONE HYDROCHLORIDE 5 MG: 5 TABLET ORAL at 23:46

## 2024-05-26 ASSESSMENT — ACTIVITIES OF DAILY LIVING (ADL)
ADLS_ACUITY_SCORE: 20
ADLS_ACUITY_SCORE: 20
ADLS_ACUITY_SCORE: 21
ADLS_ACUITY_SCORE: 20
ADLS_ACUITY_SCORE: 20
ADLS_ACUITY_SCORE: 21
ADLS_ACUITY_SCORE: 20
ADLS_ACUITY_SCORE: 21
ADLS_ACUITY_SCORE: 20
ADLS_ACUITY_SCORE: 21
ADLS_ACUITY_SCORE: 21
ADLS_ACUITY_SCORE: 20
ADLS_ACUITY_SCORE: 21
ADLS_ACUITY_SCORE: 21
ADLS_ACUITY_SCORE: 20
ADLS_ACUITY_SCORE: 21
ADLS_ACUITY_SCORE: 20
ADLS_ACUITY_SCORE: 20
ADLS_ACUITY_SCORE: 21
ADLS_ACUITY_SCORE: 21
ADLS_ACUITY_SCORE: 20

## 2024-05-26 NOTE — PLAN OF CARE
2 Data: Vital signs within normal limits. Postpartum checks within normal limits - see flow record. Patient eating and drinking normally. Patient able to empty bladder independently and is up ambulating. No apparent signs of infection. Incision healing well. Patient performing self cares & is able to pass flatus.   Action: Patient medicated during the shift for pain and cramping. See MAR. Patient reassessed within 1 hour after each medication and pain was improved - patient stated she was comfortable. Patient education done about drinking more fluids & walking in hallways. . See flow record.  Response: pumping & Positive attachment behaviors observed with infant in NICU. Support persons Dm present.   Plan: Anticipate discharge on 5/27/24.    Goal Outcome Evaluation: Improving      Plan of Care Reviewed With: patient, spouse    Overall Patient Progress: improvingOverall Patient Progress: improving

## 2024-05-26 NOTE — PLAN OF CARE
Goal Outcome Evaluation:      Plan of Care Reviewed With: patient, significant other    Overall Patient Progress: improvingOverall Patient Progress: improving    Outcome Evaluation: VSS, postpartum assessment WDL. C/S incision open to air, interdry between folds. Fundus firm and 1 below U, lochia scant and rubra. Pain managed with scheduled tylenol and ibuprofen. Ambulating and voiding without difficulty. Pumping every 3-4 hours for  in NICU, needs reminders. Significant other and family in room and supportive of pt. Continue with plan of care.

## 2024-05-26 NOTE — PROVIDER NOTIFICATION
05/26/24 1802   Provider Notification   Provider Name/Title Dr Alvarez   Method of Notification Electronic Page   Request Evaluate-Remote   Notification Reason Other  (She is back, I gave the benadryl and the ESGIC tablet.  Now rating headache 10/10, took BP just to be sure and it is still normal - 128/78)

## 2024-05-26 NOTE — PROVIDER NOTIFICATION
05/26/24 1548   Provider Notification   Provider Name/Title Dr Rico   Method of Notification Electronic Page   Request Evaluate-Remote   Notification Reason Other  (7120 is c/o of a headache 7/10.  She has had tylenol and ibuprofen and it still persists.  BP just taken - 112/70.  She says the pain gets worse with standing and pumping.)

## 2024-05-26 NOTE — PROGRESS NOTES
Brief progress note    Contacted by RN regarding patient's 7/10 headache present despite treatment with tylenol/ibuprofen ~4 hours prior. This morning she also reported a similar headache that was treated with pain medication early on, but that returned when her pain medication began to wear off. Received a page at 5877 about a similar headache, though now she reports the pain worsens with standing and pumping, but doesn't resolve with lying down. The patient has been normotensive, and denies blurry vision, chest pain, and other pre-eclampsia symptoms. Attempted to see the patient multiple times; however she was not available to be seen due to being in the shower and then the NICU.   Dehydration/fatigue headache vs possible caffeine withdrawal vs migraine vs pre-e vs spinal headache.   Likely dehydration/fatigue and/or possible caffeine withdrawal. Pre-e unlikely given patient is normotensive and has no other s/sx of pre-e. Migraine unlikely, as no prior hx. Positional component supports spinal headache from epidural. Plan is to continue to treat with ibuprofen and tylenol, supplementing with caffeine. Can try fioricet when patient is next due for tylenol. If that doesn't resolve, will try reglan/benadryl. If headache is refractory to other interventions, will consult anesthesia re: possible blood patch for spinal headache.    Jacky Alvarez MD  Obstetrics and Gynecology, PGY-1  05/26/2024 5:36 PM

## 2024-05-26 NOTE — CONSULTS
"*Copied from baby's chart:    Social Work Initial Consult     DATA/ASSESSMENT     General Information  Assessment completed with: MotherMinda  Type of visit: Initial Assessment      Reason for Consult: NICU admission     SUNSHINE met with Minda in her postpartum room this morning to complete psychosocial assessment. Minda's 6-year-old daughter and her partner (FOB) Dm were also present. They have welcomed a baby boy, whom they've chosen to name \"Gabriel.\"      Living Environment:   Minda and Dm live in [place]. They have a 6-year-old daughter.      Family Factors  Family Risk Factors: limited financial resources, other children at home needing care and attention     Family Strength Factors: able and willing to advocate for self/family, able and willing to ask for help/accept help, demonstrated ability to integrate new information actively seeking resources, demonstrated commitment to being present and engaged in cares, experienced parents, local family, parental employment, reliable transportation, stable housing     Family has safe sleep option: No, discussed SW providing Pack 'n Play from [employer] when baby is ready for discharge.     Family Has Car Seat: No, Minda is interested in a referral for an infant car seat through [employer]. They do not take referrals on the weekends, so SUNSHINE will place this referral on Tuesday, 5/28.      Primary Care: Minda reports that she has not yet chosen a primary care provider for Gabriel.      Assessment of Support  Parental Marital Status: Lives with Significant Other  Who is your support system?: Other family   Description of Support System: Supportive, Involved  Support Assessment: Adequate family and caregiver support, Adequate social supports     Employment/Financial/Insurance  Minda reports that she ended her employement at [employer] about a month ago. Dm is employed full-time.      Minda reports that finances are tight and was open to any support or resources SW " "could provide in helping her to obtain baby supplies for Gabriel. SW will provide donated Pack 'n Play from [employer] upon discharge. SW will place referral to [employer] for an infant car seat. SW also provided Minda with a [place] parking pass, and will follow up for additional parking support. Minda reports that she is already connected to [facility].     Insurance: Blue Plus MA through [facility].         Mental Health/Coping  SW facilitated conversation on post partum mood disorders. Discussed increased risk for parents of babies in the NICU. Minda expressed understanding and knowledge of PMADs, but declined any concerns at this time. SW discussed ability to help facilitate connection to mental health resources such as therapy and support groups as needed/requested and encouraged Minda to reach out with any questions or needs.      Minda appears to be coping well with Gabriel's NICU admission so far. She reports that her daughter was also born prematurely (at 29 weeks) and was in the NICU at [facility]. Because of this, she feels like she knows what to expect, which is comforting to her.             INTERVENTION  Conducted chart review and consulted with medical team regarding plan of care.   Introduced SW role and scope of practice.      Orientation to the unit (parking, lodging, meals, visitation)  Provided assessment of patient and family's level of coping  Conducted psychosocial assessment   Validated emotions and provided supportive listening  Facilitated service linkage with hospital and community resources  Described process for obtaining birth certificate, social security card and insurance  Provided SW contact info     PLAN  SW will continue to follow for supportive intervention.      REHANA Hughes, [facility]  Maternal and Child Health   [place] 08:00-16:30 on [employer]   Office Phone: [phone]  [email or web address]     After hours social work can be reached via [employer] @ \"Peds SW " "After Hours On Call 1620 to 08\"  Weekend on-site social work can be reached via [employer] @ \"Peds SW Weekend Onsite 08 to 1630\"  "

## 2024-05-26 NOTE — PLAN OF CARE
Goal Outcome Evaluation:       Data: Vital signs within normal limits. Postpartum checks within normal limits - see flow record. Patient eating and drinking normally. Patient able to empty bladder independently and is up ambulating. No apparent signs of infection. Incision healing well. Patient performing self cares and visited infant in NICU for majority of shift.   Action: Patient medicated during the shift for pain. See MAR. Patient reassessed within 1 hour after each medication and pain was improved - patient stated she was comfortable. Patient education done about pumping breast milk, incision care. See flow record.  Response: Positive attachment behaviors observed with infant. Support persons Dm present.   Plan: Anticipate discharge on 5/27.

## 2024-05-27 ENCOUNTER — ANESTHESIA (OUTPATIENT)
Dept: OBSTETRICS | Facility: CLINIC | Age: 29
End: 2024-05-27
Payer: COMMERCIAL

## 2024-05-27 ENCOUNTER — ANESTHESIA EVENT (OUTPATIENT)
Dept: OBSTETRICS | Facility: CLINIC | Age: 29
End: 2024-05-27
Payer: COMMERCIAL

## 2024-05-27 VITALS
SYSTOLIC BLOOD PRESSURE: 104 MMHG | HEART RATE: 61 BPM | TEMPERATURE: 98 F | RESPIRATION RATE: 16 BRPM | OXYGEN SATURATION: 100 % | DIASTOLIC BLOOD PRESSURE: 66 MMHG

## 2024-05-27 PROCEDURE — 3E0R3GC INTRODUCTION OF OTHER THERAPEUTIC SUBSTANCE INTO SPINAL CANAL, PERCUTANEOUS APPROACH: ICD-10-PCS | Performed by: STUDENT IN AN ORGANIZED HEALTH CARE EDUCATION/TRAINING PROGRAM

## 2024-05-27 PROCEDURE — 250N000013 HC RX MED GY IP 250 OP 250 PS 637

## 2024-05-27 PROCEDURE — 99238 HOSP IP/OBS DSCHRG MGMT 30/<: CPT | Mod: GC | Performed by: OBSTETRICS & GYNECOLOGY

## 2024-05-27 PROCEDURE — 370N000003 HC ANESTHESIA WARD SERVICE: Performed by: STUDENT IN AN ORGANIZED HEALTH CARE EDUCATION/TRAINING PROGRAM

## 2024-05-27 RX ORDER — OXYCODONE HYDROCHLORIDE 5 MG/1
5 TABLET ORAL EVERY 6 HOURS PRN
Qty: 12 TABLET | Refills: 0 | Status: SHIPPED | OUTPATIENT
Start: 2024-05-27 | End: 2024-05-30

## 2024-05-27 RX ADMIN — IBUPROFEN 800 MG: 800 TABLET, FILM COATED ORAL at 08:18

## 2024-05-27 RX ADMIN — IBUPROFEN 800 MG: 800 TABLET, FILM COATED ORAL at 14:11

## 2024-05-27 RX ADMIN — POLYETHYLENE GLYCOL 3350 17 G: 17 POWDER, FOR SOLUTION ORAL at 08:17

## 2024-05-27 RX ADMIN — BUTALBITAL, ACETAMINOPHEN, AND CAFFEINE 1 TABLET: 50; 325; 40 TABLET ORAL at 12:09

## 2024-05-27 RX ADMIN — SIMETHICONE 80 MG: 80 TABLET, CHEWABLE ORAL at 12:09

## 2024-05-27 RX ADMIN — BUTALBITAL, ACETAMINOPHEN, AND CAFFEINE 1 TABLET: 50; 325; 40 TABLET ORAL at 05:57

## 2024-05-27 RX ADMIN — OXYCODONE HYDROCHLORIDE 5 MG: 5 TABLET ORAL at 03:40

## 2024-05-27 RX ADMIN — SIMETHICONE 80 MG: 80 TABLET, CHEWABLE ORAL at 08:18

## 2024-05-27 RX ADMIN — METOCLOPRAMIDE 10 MG: 10 TABLET ORAL at 02:00

## 2024-05-27 RX ADMIN — SENNOSIDES AND DOCUSATE SODIUM 2 TABLET: 50; 8.6 TABLET ORAL at 08:18

## 2024-05-27 RX ADMIN — IBUPROFEN 800 MG: 800 TABLET, FILM COATED ORAL at 02:00

## 2024-05-27 RX ADMIN — DIPHENHYDRAMINE HYDROCHLORIDE 25 MG: 25 CAPSULE ORAL at 02:00

## 2024-05-27 ASSESSMENT — ACTIVITIES OF DAILY LIVING (ADL)
ADLS_ACUITY_SCORE: 20

## 2024-05-27 ASSESSMENT — LIFESTYLE VARIABLES: TOBACCO_USE: 1

## 2024-05-27 NOTE — PROVIDER NOTIFICATION
05/26/24 2020   Provider Notification   Provider Name/Title Dr Meyers   Method of Notification Electronic Page   Request Evaluate-Remote   Notification Reason Status Update  (Pt still rating headache 10/10, said now ache is in front of head and back of head, radiating into neck. Gave reglan.)

## 2024-05-27 NOTE — PLAN OF CARE
Goal Outcome Evaluation:      Plan of Care Reviewed With: patient    Overall Patient Progress: no change    Vital signs WDL.  Postpartum checks: WDL. Incision: WDL, no apparent signs of infection. Pt eating and drinking without issue. Pt ambulating and voiding independently. Pt performing self-cares. Pt medicated for pain during the shift. Pt reported headache of 10/10. Pt is pumping, infant in NICU.     Paperwork: BC and ROP completed

## 2024-05-27 NOTE — ANESTHESIA PROCEDURE NOTES
Blood Patch:    Staff -        Anesthesiologist:  Kaila Whalen MD       Resident/Fellow: Jessica Gomez MD       Performed By: anesthesiologist       Patient location during procedure: PACU       Start time: 5/27/2024 10:40 AM  Preanesthetic Checklist:        Completed: patient identified, pre-op evaluation, timeout performed, IV checked, risks and benefits discussed, monitors and equipment checked and anesthesia consent given    Procedure Information:        Location of venous blood draw: arm       Volume of blood injected: 20 mL       Blood collected by proceduralist using sterile technique.       Patient position: sitting       Prep: Chloraprep       Sterile Barriers: gloves, mask, washed/disinfected hands and drape       Patient monitoring: blood pressure monitoring       Approach: midline       Injection technique: SUSANA saline       Location: L3-4       Injection method: Touhy needle       Needle gauge: 17 G       Needle length (cm): 9 cm       Loss of resistance depth: 7.5 cm    Assessment:       Events: well tolerated       Reason for Blood Patch: spinal headache

## 2024-05-27 NOTE — PROGRESS NOTES
[facility]   Post-partum Progress Note    Name:  Minda Silverman  MRN: [id number]    S: Patient is doing well. Last night around midnight she reported an intense left frontal headache. She asked for some pain medication, but when the nurse came back with it, she had fallen asleep. Upon waking, the patient continued to have a 6-7/10 headache; however she last took any pain medication at 2100 last night. After she received pain medication this morning, her headache resolved. Normotensive, no blurry vision, CP, RUQ/epigastric pain, sudden change in edema. Otherwise her abdominal pain is well controlled. Voiding spontaneously. Tolerating regular diet without nausea or vomiting.  Ambulating without without any issues. Has passed flatus but has not had a bowel movement yet. Lochia is within expected limits. Pumping for baby in NICU.      O:   Patient Vitals for the past 24 hrs:   BP Temp Temp src Pulse Resp   24 1756 128/78 -- -- 64 --   24 1536 112/70 98.1  F (36.7  C) Oral 55 16   24 0809 104/63 99.4  F (37.4  C) Oral 50 20   24 0137 128/75 98.1  F (36.7  C) Oral 62 17     Gen:  Resting comfortably, NAD  CV:  RR, well perfused  Pulm:  Non-labored breathing on room air  Abd:  Soft, appropriately ttp, non-distended. Fundus unable to palpate given habitus.   Incision:  Clean, dry, intact. No erythema, drainage or induration  Ext:  non-tender, trace edema to bilateral lower extremities    No intake/output data recorded.      Assessment/Plan:  Minda Silverman 28 year old  on POD #3 s/p PLTCS for PPROM iso Category II FHT. Beginning to meet goals for discharge home, however has not passed flatus yet. Waiting on ROBF before discharge, likely tomorrow    # Headache  Likely spinal headache. S/p Anesthesia evaluation. Plan for scheduled Fioricet. Plan for blood patch today. Can restart lovenox 12h after blood patch.    # Postpartum  management  Pain: Well-controlled with ibuprofen, tylenol, and oxycodone PRN   GI:  Scheduled bowel regimen, simethicone, anti-emetics  : Voiding spontaneously  Hgb: Hgb 11.9 >  ml > 11.2. Asymptomatic from acute blood loss anemia secondary.  Rh: Positive  Feed: Pumping for baby in NICU  Infant:  Stable in NICU   BC: Conversation deferred. Discussed recommended pregnancy spacing of 18 months.  PPx:  Encourage ambulation, IS, SCDs while confined to bed    Medically Ready for Discharge: Anticipated Tomorrow vs Today      Jo-Ann Carty, MS4  [employer]       Resident/Fellow Attestation   I, Selwyn Meyers MD, was present with the medical/ALLYSON student who participated in the service and in the documentation of the note.  I have verified the history and personally performed the physical exam and medical decision making.  I agree with the assessment and plan of care as documented in the note.      Selwyn Meyers MD, MPH  Ob/Gyn Resident, PGY-3  05/27/24 6:58 AM    I have seen and examined the patient with the resident. I have reviewed, edited, and agree with the note.   Appears well  Hemoglobin   Date Value Ref Range Status   05/25/2024 11.2 (L) 11.7 - 15.7 g/dL Final   05/24/2024 11.9 11.7 - 15.7 g/dL Final   06/07/2021 12.1 11.7 - 15.7 g/dL Final       Eli Balderrama MD

## 2024-05-27 NOTE — PROGRESS NOTES
OB Postpartum Progress Note  POD#2 s/p PLTCS for PPROM    S: Patient complains of a 10/10 headache that started a few days ago. Started in the in the front of her head now it's posterior. It worse w/ standing and improves when flat. Has tried tylenol, NSAIDs and oxy, but nothing has helped.     O:  Patient Vitals for the past 24 hrs:   BP Temp Temp src Pulse Resp   24 1756 128/78 -- -- 64 --   24 1536 112/70 98.1  F (36.7  C) Oral 55 16   24 0809 104/63 99.4  F (37.4  C) Oral 50 20   24 0137 128/75 98.1  F (36.7  C) Oral 62 17   24 2103 108/62 97.9  F (36.6  C) Oral 55 18       Gen: NAD  Resp: Non-labored breathing on room air      A/P: Minda Silverman is a 28 year old  who is POD#2 s/p PLTCS for PPROM, w/ new headache.     #Headache   Recent epidural, suspect spinal headache. Low concern for preeclampsia given normal Bps  - s/p Consult anesthesia> blood patch tomorrow (), ordered Fioricet scheduled per anesthesia  - Flexeril 5 mg PO Q8 prn   - Lovenox held, Can resume lovenox 12 hours after blood patch    Jo-Ann Carty, MS4  [employer] [facility] [employer]     Resident/Fellow Attestation   I, Selwyn Meyers MD, was present with the medical/ALLYSON student who participated in the service and in the documentation of the note.  I have verified the history and personally performed the physical exam and medical decision making.  I agree with the assessment and plan of care as documented in the note.      Selwyn Meyers MD, MPH  Ob/Gyn Resident, PGY-3  24 11:16 PM

## 2024-05-27 NOTE — DISCHARGE INSTRUCTIONS
BLOOD PATCH   How can you care for yourself at home?  For the first 24 hours, return to your regular activities slowly. Avoid strenuous activity that could dislodge the blood clot such as heavy coughing, sneezing, straining, heavy lifting etc. Report any back or leg pain, leg weakness, signs of infections or a headache that does not go away.  If the injection site is sore, put ice or a cold pack on the area for 10 to 20 minutes at a time. Put a thin cloth between the ice and your skin.  When should you call for help?   Call your doctor now or seek immediate medical care if:    You have a fever.     You have a new or worse headache.     You have a stiff neck.     You have drainage or bleeding from the puncture site.     You have tingling, weakness, or numbness in your legs.     You have trouble urinating or can pass only very small amounts of urine.   Watch closely for changes in your health, and be sure to contact your doctor if:    You do not get better as expected.       Warning Signs after Having a Baby    Keep this paper on your fridge or somewhere else where you can see it.    Call your provider if you have any of these symptoms up to 12 weeks after having your baby.    Thoughts of hurting yourself or your baby  Pain in your chest or trouble breathing  Severe headache not helped by pain medicine  Eyesight concerns (blurry vision, seeing spots or flashes of light, other changes to eyesight)  Fainting, shaking or other signs of a seizure    Call 9-1-1 if you feel that it is an emergency.     The symptoms below can happen to anyone after giving birth. They can be very serious. Call your provider if you have any of these warning signs.    My provider s phone number: _______________________    Losing too much blood (hemorrhage)    Call your provider if you soak through a pad in less than an hour or pass blood clots bigger than a golf ball. These may be signs that you are bleeding too much.    Blood clots in the legs  or lungs    After you give birth, your body naturally clots its blood to help prevent blood loss. Sometimes this increased clotting can happen in other areas of the body, like the legs or lungs. This can block your blood flow and be very dangerous.     Call your provider if you:  Have a red, swollen spot on the back of your leg that is warm or painful when you touch it.   Are coughing up blood.     Infection    Call your provider if you have any of these symptoms:  Fever of 100.4 F (38 C) or higher.  Pain or redness around your stitches if you had an incision.   Any yellow, white, or green fluid coming from places where you had stitches or surgery.    Mood Problems (postpartum depression)    Many people feel sad or have mood changes after having a baby. But for some people, these mood swings are worse.     Call your provider right away if you feel so anxious or nervous that you can't care for yourself or your baby.    Preeclampsia (high blood pressure)    Even if you didn't have high blood pressure when you were pregnant, you are at risk for the high blood pressure disease called preeclampsia. This risk can last up to 12 weeks after giving birth.     Call your provider if you have:   Pain on your right side under your rib cage  Sudden swelling in the hands and face    Remember: You know your body. If something doesn't feel right, get medical help.     For informational purposes only. Not to replace the advice of your health care provider. Copyright 2020 [facility]. All rights reserved. Clinically reviewed by Erum Garza, RNC-OB, MSN. [employer] 166726 - Rev 02/23.    Postpartum Care at Home With Your Baby: Care Instructions  Overview     After childbirth (postpartum period), your body goes through many changes as you recover. In these weeks after delivery, try to take good care of yourself. Get rest whenever you can and accept help from others.  It may take 4 to 6 weeks to feel like yourself again,  "and possibly longer if you had a  birth. You may feel sore or very tired as you recover. After delivery, you may continue to have contractions as the uterus returns to the size it was before your pregnancy. You will also have some vaginal bleeding. And you may have pain around the vagina as you heal. Several days after delivery you may also have pain and swelling in your breasts as they fill with milk. There are things you can do at home to help ease these discomforts.  After childbirth, it's common to feel emotional. You may feel irritable, cry easily, and feel happy one minute and sad the next. This is called the \"baby blues.\" Hormone changes are one cause of these emotional changes. These feelings usually get better within a couple of weeks. If they don't, talk to your doctor or midwife.  In the first couple of weeks after you give birth, your doctor or midwife may want to check in with you and make a plan for follow-up care. You will likely have a complete postpartum visit in the first 3 months after delivery. At that time, your doctor or midwife will check on your recovery and see how you're doing. But if you have questions or concerns before then, you can always call your doctor or midwife.  Follow-up care is a key part of your treatment and safety. Be sure to make and go to all appointments, and call your doctor if you are having problems. It's also a good idea to know your test results and keep a list of the medicines you take.  How can you care for yourself at home?  Taking care of your body  Use pads instead of tampons for bleeding. After birth, you will have bloody vaginal discharge. You may also pass some blood clots that shouldn't be bigger than an egg. Over the next 6 weeks or so, your bleeding should decrease a little every day and slowly change to a pinkish and then whitish discharge.  For cramps or mild pain, try an over-the-counter pain medicine, such as acetaminophen (Tylenol) or ibuprofen " (Advil, Motrin). Read and follow all instructions on the label.  To ease pain around the vagina or from hemorrhoids:  Put ice or a cold pack on the area for 10 to 20 minutes at a time. Put a thin cloth between the ice and your skin.  Try sitting in a few inches of warm water (sitz bath) when you can or after bowel movements.  Clean yourself with a gentle squeeze of warm water from a bottle instead of wiping with toilet paper.  Use witch hazel or hemorrhoid pads (such as Tucks).  Try using a cold compress for sore and swollen breasts. And wear a supportive bra that fits.  Ease constipation by drinking plenty of fluids and eating high-fiber foods. Ask your doctor or midwife about over-the-counter stool softeners.  Activity  Rest when you can.  Ask for help from family or friends when you need it.  If you can, have another adult in your home for at least 2 or 3 days after birth.  When you feel ready, try to get some exercise every day. For many people, walking is a good choice. Don't do any heavy exercise until your doctor or midwife says it's okay.  Ask your doctor or midwife when it is okay to have vaginal sex.  If you don't want to get pregnant, talk to your doctor or midwife about birth control options. You can get pregnant even before your period returns. Also, you can get pregnant while you are breastfeeding.  Talk to your doctor or midwife if you want to get pregnant again. They can talk to you about when it is safe.  Emotional health  It's normal to have some sadness, anxiety, and mood swings after delivery. It may help to talk with a trusted friend or family member. You can also call the Maternal Mental Health Hotline at [facility] (1-526.463.8659) for support. If these mood changes last more than a couple of weeks, talk to your doctor or midwife.  When should you call for help?  Share this information with your partner, family, or a friend. They can help you watch for warning signs.  Call 911  anytime you  think you may need emergency care. For example, call if:    You feel you cannot stop from hurting yourself, your baby, or someone else.     You passed out (lost consciousness).     You have chest pain, are short of breath, or cough up blood.     You have a seizure.   Where to get help 24 hours a day, 7 days a week   If you or someone you know talks about suicide, self-harm, a mental health crisis, a substance use crisis, or any other kind of emotional distress, get help right away. You can:    Call the Suicide and Crisis Lifeline at 988.     Call 3-343-158-TALK (1-603.157.3110).     Text HOME to 619441 to access the Crisis Text Line.   Consider saving these numbers in your phone.  Go to [employer] for more information or to chat online.  Call your doctor or midwife now or seek immediate medical care if:    You have signs of hemorrhage (too much bleeding), such as:  Heavy vaginal bleeding. This means that you are soaking through one or more pads in an hour. Or you pass blood clots bigger than an egg.  Feeling dizzy or lightheaded, or you feel like you may faint.  Feeling so tired or weak that you cannot do your usual activities.  A fast or irregular heartbeat.  New or worse belly pain.     You have signs of infection, such as:  A fever.  Increased pain, swelling, warmth, or redness from an incision or wound.  Frequent or painful urination or blood in your urine.  Vaginal discharge that smells bad.  New or worse belly pain.     You have symptoms of a blood clot in your leg (called a deep vein thrombosis), such as:  Pain in the calf, back of the knee, thigh, or groin.  Swelling in the leg or groin.  A color change on the leg or groin. The skin may be reddish or purplish, depending on your usual skin color.     You have signs of preeclampsia, such as:  Sudden swelling of your face, hands, or feet.  New vision problems (such as dimness, blurring, or seeing spots).  A severe headache.     You have signs of heart  "failure, such as:  New or increased shortness of breath.  New or worse swelling in your legs, ankles, or feet.  Sudden weight gain, such as more than 2 to 3 pounds in a day or 5 pounds in a week.  Feeling so tired or weak that you cannot do your usual activities.     You had spinal or epidural pain relief and have:  New or worse back pain.  Increased pain, swelling, warmth, or redness at the injection site.  Tingling, weakness, or numbness in your legs or groin.   Watch closely for changes in your health, and be sure to contact your doctor or midwife if:    Your vaginal bleeding isn't decreasing.     You feel sad, anxious, or hopeless for more than a few days.     You are having problems with your breasts or breastfeeding.   Where can you learn more?  Go to https://www.[employer].net/patiented  Enter Z768 in the search box to learn more about \"Postpartum Care at Home With Your Baby: Care Instructions.\"  Current as of: July 10, 2023               Content Version: 14.0    4688-4246 [employer].   Care instructions adapted under license by your healthcare professional. If you have questions about a medical condition or this instruction, always ask your healthcare professional. [employer] disclaims any warranty or liability for your use of this information.      "

## 2024-05-27 NOTE — PLAN OF CARE
Patient states relief post blood patch. VSS. Offers no complaints. Will transfer back to [facility] at 1 hour post procedure.

## 2024-05-27 NOTE — PLAN OF CARE
Patient transferred to PACU from [facility] for blood patch d/t PDPH. Patient sitting for procedure and anesthesia at bedside. Consent signed for procedure, time out performed. Baseline VSS.

## 2024-05-27 NOTE — PLAN OF CARE
Goal Outcome Evaluation:         Pt VSS, postpartum assessment WDL ex headache.  Pt rating headache 10/10 MD and anesthesia came to assess.  Plan to do blood patch in morning.  Pt pain managed with tylenol, ibuprofen, benadryl, reglan, and ESGIC.  C/s incision GISELA, healing well.  Pt is pumping frequently and visiting baby in NICU.  Continue plan of care.        Problem: Postpartum ( Delivery)  Goal: Optimal Pain Control and Function  Intervention: Prevent or Manage Pain  Recent Flowsheet Documentation  Taken 2024 by Crisedla Mooney, RN  Pain Management Interventions: medication (see MAR)  Taken 2024 1948 by Criselda Mooney, RN  Pain Management Interventions: medication (see MAR)  Taken 2024 175 by Criselda Mooney, RN  Pain Management Interventions: medication (see MAR)

## 2024-05-27 NOTE — PLAN OF CARE
Goal Outcome Evaluation:      Plan of Care Reviewed With: patient    Overall Patient Progress: improvingOverall Patient Progress: improving    Outcome Evaluation: VSS. Headache pain now gone and patient feeling so much better. Pt pumping for infant in the NICU. Pt tolerating regular diet, ambulating in room and halls and voiding without difficulty. Pt able to have a postpartum bowel movement today. Postpartum checks WNL. Pt frequently visiting infant son in the NICU.    Discharge instructions and medications reviewed with patient. Patient verbalized understanding. Pt discharged to home, all belongings taken with patient.       Problem: Postpartum ( Delivery)  Goal: Optimal Pain Control and Function  Outcome: Met  Intervention: Prevent or Manage Pain  Recent Flowsheet Documentation  Taken 2024 1215 by Pamela Fajardo, RN  Pain Management Interventions: medication (see MAR)

## 2024-05-27 NOTE — PROGRESS NOTES
Anesthesia Post-Partum Follow-Up Note After  section with Dural Puncture Epidural  May 26, 2024      Patient: Minda Silverman    Patient location: Post-partum floor    Anesthesia type: Dural puncture epidural    Subjective  Minda Silverman does not complain of pruritis at this time. She denies weakness, denies paresthesia, denies difficulties breathing or voiding, denies nausea or vomiting, and admits to headache. She describes her headache significant 10/10 pain, predominantly on the front and back, and positional in nature (worsens when standing w/ mild improvement when flat). She is able to ambulate and tolerates regular diet. Patient endorsed a negative anesthesia experience.    Objective  Respiratory Function (RR / SpO2 / Airway Patency): Satisfactory, ORA  Cardiac Function (HR / Rhythm / BP): Satisfactory, HDS  Strength and sensation lower extremities: Normal and intact bilaterally.  Site of epidural insertion: No signs of infection or inflammation.    Most recent vitals  /78   Pulse 64   Temp 36.7  C (98.1  F) (Oral)   Resp 16   LMP 09/15/2023 (Approximate)   SpO2 100%   Breastfeeding Unknown     Assessment and plan  Minda Silverman is a 28 year old female  post-partum #2 s/p CS. DPE for C/S cb suspected PDPH 2/2 visualized wet tap with Touhy needle. The patient delivered via CS for PPROM and cat II FHT. Epidural catheter was kept in place post-operatively per OB team request and removed AM POD1 with catheter tip intact. She developed significant frontal headache POD2, which continuously worsened over the course of the day. Conservative therapies of scheduled acetaminophen, ibuprofen, flat bedrest, and prn muscle relaxants and antiemetics were started POD2. After discussing conservative therapies, sphenopalatine block, and epidural blood patch, pt requested to have an epidural blood patch as soon as possible. Given her AC, we will pursue epidural blood patch  placement AM 5/27/24.    -Hold AM dose of scheduled lovenox  -Recommend starting Fioricet and discontinuing acetaminophen at time of next scheduled dose  -Maximize conservative therapies   -Scheduled PO ibuprofen, fioricet   -PRN antiemetics for NV   -Hydration   -Flat bedrest  -AM Epidural blood patch with OB Anesthesia provider.  -Restart lovenox 12 hours after procedure    At this time, there is  evidence of adverse side effects associated with the insertion or removal of the epidural catheter. If the patient develops new lower extremity paresis or paresthesias, or if there are concerns regarding the insertion site of the catheter, please reach out to the anesthesia department OB division ([phone]).    Thank you for including us in the care for this patient.    Salvatore Copeland MD  Anesthesiology Resident  May 26, 2024  10:09 PM

## 2024-05-27 NOTE — ANESTHESIA PREPROCEDURE EVALUATION
Anesthesia Pre-Procedure Evaluation    Patient: Minda Silverman   MRN: [id number] : 1995        Procedure : Procedure(s):   section          Past Medical History:   Diagnosis Date    NO ACTIVE PROBLEMS       Past Surgical History:   Procedure Laterality Date    CERCLAGE CERVICAL N/A 2024    Procedure: CERCLAGE, CERVIX, VAGINAL APPROACH;  Surgeon: Starr Watts MD;  [facility]: [facility]    DILATION AND CURETTAGE SUCTION N/A 2022    Procedure: DILATION AND CURETTAGE, UTERUS, USING SUCTION;  Surgeon: Vika Hopper MD;  [facility]      No Known Allergies   Social History     Tobacco Use    Smoking status: Former     Types: Cigarettes    Smokeless tobacco: Never   Substance Use Topics    Alcohol use: No      Wt Readings from Last 1 Encounters:   24 147 kg (324 lb)        Anesthesia Evaluation   Pt has had prior anesthetic. Type: OB Labor Epidural and Regional.    History of anesthetic complications  - spinal headache.  Patient s/p intrathecal catheter placement for CS on , with sxs consist with PDPH for past 2 days. On ppx lovenox for dvt prevention 40mg BID, last dose  at 1251 PM. Conservative measures attempted without success..    ROS/MED HX  ENT/Pulmonary:  - neg pulmonary ROS   (+)     MARIA G risk factors,   obese,        tobacco use, Past use,                       Neurologic:  - neg neurologic ROS     Cardiovascular:  - neg cardiovascular ROS     METS/Exercise Tolerance:     Hematologic:  - neg hematologic  ROS   (+)      anemia,          Musculoskeletal:       GI/Hepatic:  - neg GI/hepatic ROS     Renal/Genitourinary:       Endo:  - neg endo ROS   (+)               Obesity,       Psychiatric/Substance Use:  - neg psychiatric ROS     Infectious Disease:       Malignancy:       Other: Comment:   on POD #3 s/p PLTCS for PPROM iso Category II FHT           Physical Exam    Airway  airway exam normal           Respiratory Devices and Support          Dental       (+) Minor Abnormalities - some fillings, tiny chips      Cardiovascular   cardiovascular exam normal          Pulmonary   pulmonary exam normal                OUTSIDE LABS:  CBC:   Lab Results   Component Value Date    WBC 9.9 05/24/2024    WBC 10.7 03/26/2024    HGB 11.2 (L) 05/25/2024    HGB 11.9 05/24/2024    HCT 36.0 05/24/2024    HCT 36.5 03/26/2024     05/24/2024     03/26/2024     BMP:   Lab Results   Component Value Date     (L) 02/28/2024     10/04/2021    POTASSIUM 3.6 02/28/2024    POTASSIUM 3.7 10/04/2021    CHLORIDE 100 02/28/2024    CHLORIDE 105 10/04/2021    CO2 25 02/28/2024    CO2 26 10/04/2021    BUN 5.5 (L) 02/28/2024    BUN 8 10/04/2021    CR 0.55 05/25/2024    CR 0.44 (L) 02/28/2024    GLC 82 02/28/2024    GLC 98 10/04/2021     COAGS:   Lab Results   Component Value Date    PTT 31 03/26/2024    INR 0.94 03/26/2024    FIBR 810 (H) 03/26/2024     POC:   Lab Results   Component Value Date    HCG Positive (A) 11/18/2023    HCGS Negative 10/04/2021     HEPATIC:   Lab Results   Component Value Date    ALBUMIN 3.7 02/28/2024    PROTTOTAL 7.5 02/28/2024    ALT 14 02/28/2024    AST 17 02/28/2024    ALKPHOS 84 02/28/2024    BILITOTAL 0.3 02/28/2024     OTHER:   Lab Results   Component Value Date    LACT 1.2 06/07/2021    MAURILIO 9.3 02/28/2024    MAG 1.8 02/28/2024    LIPASE 80 10/04/2021    TSH 1.79 02/28/2024       Anesthesia Plan    ASA Status:  3       Anesthesia Type:.Anesthesia Type: Blood patch.              Consents            Postoperative Care            Comments:               REMI MEJIA MD    I have reviewed the pertinent notes and labs in the chart from the past 30 days and (re)examined the patient.  Any updates or changes from those notes are reflected in this note.

## 2024-05-27 NOTE — PLAN OF CARE
"Pt returned from PACU s/p blood patch. Pt very happy with results and states her headache is \"so much better, it was instantly\". She does still c/o mild 1/10 frontal headache that \"doesn't compare to what it was\". Pt given Esgic as ordered. Pt feeling ready to discharge to home this afternoon after spending some time in the NICU with her infant.       "

## 2024-05-28 ENCOUNTER — PATIENT OUTREACH (OUTPATIENT)
Dept: CARE COORDINATION | Facility: CLINIC | Age: 29
End: 2024-05-28
Payer: COMMERCIAL

## 2024-05-28 NOTE — ANESTHESIA CARE TRANSFER NOTE
Patient: Minda Silverman    Procedure: * No procedures listed *       Diagnosis: * No pre-op diagnosis entered *  Diagnosis Additional Information: No value filed.    Anesthesia Type:   No value filed.     Note:    Oropharynx: oropharynx clear of all foreign objects  Level of Consciousness: awake  Oxygen Supplementation: room air    Independent Airway: airway patency satisfactory and stable  Dentition: dentition unchanged  Vital Signs Stable: post-procedure vital signs reviewed and stable  Report to RN Given: handoff report given  Patient transferred to: PACU    Handoff Report: Identifed the Patient, Identified the Reponsible Provider, Reviewed the pertinent medical history, Discussed the surgical course, Reviewed Intra-OP anesthesia mangement and issues during anesthesia, Set expectations for post-procedure period and Allowed opportunity for questions and acknowledgement of understanding      Vitals:  Vitals Value Taken Time   BP     Temp     Pulse     Resp     SpO2         Electronically Signed By: Kaila Whalen MD  May 28, 2024  12:02 PM

## 2024-05-28 NOTE — PROGRESS NOTES
Clinic Care Coordination Contact  Community Health Worker Initial Outreach    Patient accepts CC: No, Declined needs for extra support.     Clinic Care Coordination Contact  Transitions of Care Outreach    Chief Complaint   Patient presents with    Clinic Care Coordination - Post Hospital       Most Recent Admission Date: 2024   Most Recent Admission Diagnosis:      Most Recent Discharge Date: 2024   Most Recent Discharge Diagnosis: S/P primary low transverse  - Z98.891  Breast feeding status of mother - Z39.1     Transitions of Care Assessment    Discharge Assessment  How are you doing now that you are home?: talked with Pt's , stated that she is doing well, incase needed medical assist. planning to call us.  How are your symptoms? (Red Flag symptoms escalate to triage hotline per guidelines): Improved  Do you know how to contact your clinic care team if you have future questions or changes to your health status? : Yes  Does the patient have their discharge instructions? : Yes  Does the patient have questions regarding their discharge instructions? : No  Were you started on any new medications or were there changes to any of your previous medications? : Yes  Does the patient have all of their medications?: Yes  Do you have questions regarding any of your medications? : No  Do you have all of your needed medical supplies or equipment (DME)?  (i.e. oxygen tank, CPAP, cane, etc.): Yes    Post-op ([facility] CTA Only)  If the patient had a surgery or procedure, do they have any questions for a nurse?: No         Follow up Plan     Follow Up  Follow up with provider in 2 weeks and 6 weeks for post-delivery checks  Follow Up  Follow up with provider in 2 weeks and 6 weeks for post-delivery checks  Follow Up  Follow up with provider in 6 weeks for post-delivery check    Future Appointments   Date Time Provider Department Center   2024 11:00 AM [id number] [facility]   2024 11:30 AM HELENA  LEONARDO ALVAREZ [facility] RID       Outpatient Plan as outlined on AVS reviewed with patient.    For any urgent concerns, please contact our 24 hour nurse triage line: [phone]     KAYCEE Sanchez  [phone]  [facility]

## 2024-05-29 ENCOUNTER — LACTATION ENCOUNTER (OUTPATIENT)
Dept: OTHER | Facility: CLINIC | Age: 29
End: 2024-05-29

## 2024-05-29 NOTE — LACTATION NOTE
"This note was copied from a baby's chart.  Lactation Follow Up Note    Reason for visit/ call/ message:  5 day check  Discuss feeding plan    Supply:  10-20 ml per pumping, every 3 hours daytime, \"a little longer at night\"    Significant changes (medications, equipment, comfort, etc):  Babe cueing    Skin to skin/ nuzzling/ latching:  No latching yet  Mom stated goal is breastfeeding    Education given:  Maintain setting  Importance of nighttime pumping  Hands-on pumping and use of a hands-free binder  Hand expression    Plan:  Mom will make a hands-free binder (declined at this time due to visitors)  Returning at 8pm to work with lactation on latching        Cheryle Batista, RNC-ANDREA, IBCLC   Lactation Consultant  Shazia: Lactation Specialist Group [phone]  Office: [phone]      "

## 2024-06-03 ENCOUNTER — LACTATION ENCOUNTER (OUTPATIENT)
Dept: OTHER | Facility: CLINIC | Age: 29
End: 2024-06-03

## 2024-06-03 NOTE — LACTATION NOTE
"This note was copied from a baby's chart.  I met with Minda for discharge teaching and gave pertinent handouts (see below).  We also discussed her milk supply and pumping schedule. She is pumping every 3 hours during the day, trying to pump every 4 hours at night but finding it hard to wake at times. I encouraged her to set alarms for night waking and drink water in evening so bladder may wake her to void in the night, then she is awake to pump. She is unsure exactly how much she pumps each session but estimates around 30-50ml per pumping session. Pumping has been comfortable. I gave her a hands free pumping bra and encouraged use with hands on pumping/massage, followed by hand expression to optimize pumping sessions and fully empty breasts. We discussed benefits of skin to skin holding and latching for stimulation to help increase milk supply.   Minda may decide to latch in future, we discussed keeping Gabriel breast oriented with latching 1-2 times per day as \"appetizer\" breastfeeding for 5-10minutes followed by full bottle, or \"dessert\" breastfeeding for 5-10minutes or as tolerated after a full bottle; this will help keep him breast oriented as he is learning to latch, and ensure adequate volume intake for his needs/growth. We discussed the importance of time management with feedings to optimize stamina/endurance and allow for rest breaks between feedings. Reviewed lactation discharge information packet. Encouraged seeking outpatient support as needed.  Teaching completed, all questions answered and readiness to go home is verbalized.      FANNIE Oneill, RN, IBCLC   Lactation Consultant  Shazia: Lactation Specialist Group: [phone]  Office: [phone]    [x]Discharge-- [facility] milk storage  [x]Discharge-- After first week feeding log  [x]Discharge-- [facility] peer counselor  [x]Discharge-- Nipple shields (general info)  [x]Discharge-- Discharge-- [place] lactation resources  "

## 2024-06-03 NOTE — LACTATION NOTE
"This note was copied from a baby's chart.  LACTATION DISCHARGE INSTRUCTIONS      Congratulations on your approaching discharge day!  Our goal is to help you have all the information, skills and equipment you need to help you meet your lactation goals at home.        CDC HANDOUT ON STORING AND PREPARING HUMAN MILK AT HOME    Please see attached handout   https://www.cdc.gov/breastfeeding/recommendations/handling_breastmilk.htm      FEEDING LOG: BABY'S FIRST WEEK, SECOND WEEK AND BEYOND    Please see attached feeding logs  Goal is to eat at least 8 times in 24 hours  Goal is to have at least 6 wet diapers in 24 hours  Talk to your provider about goal for soiled diapers.  Each baby is different depending on age and what they are eating      OTHER DISCHARGE INFORMATION    Medications:   Some women may find certain types of hormonal birth control, decongestants or antihistamines may impact supply-- talk to your provider.  Always get a second opinion from a lactation consultant or a provider familiar with lactation if told to stop latching or \"pump and dump\" when starting a new medication, having a procedure or you are ill; most of the time things are compatible.      TRANSITIONING TO MORE FEEDINGS AT HOME    Often, babies go home from the NICU doing a combination of breastfeeding and bottle feeding.  With time and patience, most will go on to nurse most or all their feedings.  infants, in particular, may not be able to fully nurse until at or after their due date. To ensure your baby is taking adequate volumes, some babies may need supplemental bottle after breastfeeding. Keep these things in mind as you nurse your baby at home:    Good time management is key!  Make feedings efficient so you have time to eat, sleep, and pump.    It is important to latch your baby frequently, even if he or she is taking small amounts. Staying skin to skin will also help keep your baby \"breast oriented\".  Going days without latching " will make it more difficult.  Babies can be re-taught how to latch, but this is very time consuming and not always successful.        Please see a lactation consultant ASAP if you are not meeting your latching goal.  It is easier to make changes now, versus weeks or months down the road.      HOW TO WEAN FROM THE NIPPLE SHIELD    Many NICU babies use a nipple shield for a period of time, especially premature babies and babies recovering from illness or surgery.  It helps them stay latched on and get milk more easily.    How do you know it's time to try off the nipple shield?    Your baby is waking on their own before feedings  Your baby is able to stay awake during the entire feeding, without a lot of encouragement to stay awake  Your baby's suck is significantly stronger   Your baby is taking full feedings at the breast  Typically, at or after their due date    How do I wean off the nipple shield?    Start the feeding with the nipple shield in place, then take the nipple shield off penitentiary through the feeding and re-latch  Try at feedings where your baby is calm, not when they are frantically hungry  Middle of the night can be a good time to try, when everyone is relaxed  https://www.MobStac.Skritter/blog/my-ten-step-process-for-weaning-from-the-nipple-shield/    How do I know my baby is eating well without the nipple shield?    They seem satisfied after feeding  Your breasts feels softer after the feeding  Your baby has enough wet and soiled diapers  If using a breastfeeding scale-- the numbers on the scale are similar to a feeding with a nipple shield  If you have problems getting off the nipple shield, please make an appointment with a lactation consultant.      HOW TO WEAN FROM THE PUMP (AFTER YOUR BABY TAKES A FULL BREASTFEEDING)    Your milk supply may be greater than what your baby needs after discharge. It is important that you gradually wean from pumping after your baby takes a full breastfeeding  "(without needing a top-off).  If you wean too quickly, you will be uncomfortable and you run the risk of causing your supply to drop.    If you have been pumping less than two weeks:    If you are uncomfortable after a full breastfeeding, pump only until you are comfortable (versus pumping until empty)      If you have been pumping two weeks or more:    Continue to pump after every breastfeeding, but gradually decrease the time or volume you pump.   Example based on time: If you have been pumping 20 minutes after each full breastfeeding, decrease to 18 minutes for two days. If still comfortable, decrease to 16 minutes for another two days.   Example based on volume: If you normally pump 2 oz after a feeding, pump 1.75 oz for a few days, 1.5 oz for a few days, etc  Continue this way until you no longer need to pump (after breastfeeding).    Remember that if you are bottle feeding some feedings, you need to pump at the time you would have latched your baby. If you do not, you might start decreasing your milk supply.        OTHER LATCHING INFORMATION    Growth Spurts: Common times for \"growth spurts\" are around 7-10 days, 2-3 weeks, 4-6 weeks, 3 months, 4 months, 6 months and 9 months, but these vary widely between babies.  During these times allow your baby to nurse very frequently (or pump more frequently) to temporarily boost your supply, as opposed to supplementing.  It should pass in a few days when your supply increases, and your baby will settle into a new feeding pattern.    How to get a breastfeeding test weight scale:   Rental (2ml sensitivity):   [facility] ([facility]) [phone]   [employer] and [facility] ([facility]) [phone]  [place] [employer]) [phone]   Purchase scale (6ml sensitivity):   \"[place] Baby Scale\" ([employer], Amazon, etc), around $150      LACTATION SUPPORT    [place] [facility]  [phone] (Women's Services Scheduling)    [facility] - " "[place]  [phone] or [phone]    [facility]  [phone]    [facility]*    M [facility]    M [facility] [place]    M [facility]    M [facility] [facility]*    M [facility] [place]*    *OSCAR Harrell, CNM, IBCLC   Tuesday:  [facility],  8:30 - 5:00   Wednesday:  [place] [facility], 7th floor, 8:30 - 4:00   Thursday:  [place] [facility], [facility], 8:30 - 4:00      Other Lactation Help:  [facility] (Tuesday 1-2pm Infant Feeding Q&A)     [phone]  [place] Baby Weigh In (Thursday 9:30am Lactation Lounge)    www.Metabiota ++HAS VIRTUAL SUPPORT++   Enlightened Mama   www.Tracked.com [phone]  Home or in-office ([place])  Everyday Miracles         https://www.everyday-miracles.org/  [facility]     [phone] ++HAS VIRTUAL SUPPORT++   Sadie Kelly DO, MPH, ABOIM, IBCLC  Integrative Family Medicine Physician/Breastfeeding Medicine/ Home visits  www.[employer]  [phone]  [facility] \"Well Fed\" postpartum group ([facility])   [phone]  Support in other languages:  Kenyan:  Karol (IBCLC/ Kenyan) [phone]  danay@co.[facility].  La Leche League: Si quieres ayuda en espanol con sheng pecho por favor llama Yaneth al [phone].  Fanny Gloria [facility]isyn [facility] & [place]  For more information call [facility] [phone] or Janette at [phone] ([place]) or Octavia Bustamante at [phone] ([place]).  [place]: Fridays, 10:30 am to noon. [facility]-[address] [place] [address]: Wednesdays, 1:00-2:00 PM. [facility], [address] [place] [address]" "([place]mong) [phone]  [place] (Swedish) [phone]   breastfeeding support:  [facility] [place] ([place])     www.[facility]  ([phone]  Chocolate Milk Club:    http://www.BuzzoolsZigfu.ReviewZAP/chocolate-milk-club/  Dr. Sarita Betancourt, [phone]  DIVA Moms (Dynamic Involved Valued  Moms)   [phone]  [employer] Birthworks  [phone] or ahavahbirthworks@[employer]  https://www.[facility].com/Blackfamiliesdobreastfeed  ong Breastfeeding Coalition:  See Facebook site  hmongbf@[employer] Tricia Kellytalha Dominguez [phone]  [place] Indigenous Breastfeeding [place]      See Facebook site or Google \"Nitadrien Singhwingheracliobar\"  indigenousshashmillicent.[email or web address].ReviewZAP  Truman Huston [phone]   Alexandria Santos [email or web address] Lactation Support  [facility], [facility]: [phone], ext. [phone]. Outpatient appointments, phone assist   [facility], [phone]. Outpatient appointments, phone assist   [facility], [phone]. Inpatient services, outpatient appointments, phone assist   [facility], Inpatient services only   [facility], [phone]. Inpatient services, outpatient appointments, phone assist, 24-hour availability   [facility], [phone]. Inpatient services, outpatient appointments, phone assist   [facility], [phone], ext. [phone]. Inpatient services, phone assist -- Hours: 7 a.m. to 3:30 p.m. every day. After hours: Messages will be returned within 24 hours.    Telephone and Online Support    WIC ++HAS VIRTUAL SUPPORT++ (call for eligibility information)   [phone]    BabyCafes (www.babycafeusa.org) (now in person)    La Leche [employer]   ++HAS VIRTUAL SUPPORT++  www.llli.org  9-085-3-LA-LECHE (137-114-3539)  Local referral line [phone]  Si quieres ayuda en " espanol con sheng pecho por favor claire Wolfe al [phone].    MelindaMom-- up to date lactation information  Www.Askuity.Sandata    International Breastfeeding [place] ([place]y Alcala)  Http://ibconline.ca/    The InfantRisk Call Center is available to answer questions about the use of medications during pregnancy and while breastfeeding  [phone]  www.infantCentrana Health.Sandata     Office on Women's Health National Breastfeeding Help Line  8am to 5pm, English and Kazakh [phone] option 1    https://www.womenshealth.gov/breastfeeding/ Ovrx8Kiif Rosetta (free on [employer] rosetta store or Google Play)

## 2024-06-26 ENCOUNTER — TELEPHONE (OUTPATIENT)
Dept: OBGYN | Facility: CLINIC | Age: 29
End: 2024-06-26
Payer: COMMERCIAL

## 2024-06-26 DIAGNOSIS — O92.79 LACTATION SYMPTOM: Primary | ICD-10-CM

## 2024-06-26 NOTE — TELEPHONE ENCOUNTER
Fenugreek supplement for mother-  Fenugreek capsules: 3 capsules 3 times daily for 1-2 weeks.  Dosage range should be 1000-1500mg three times/day.   OR  Moringa/[place] capsules (Go-Lacta is one brand) - dose range is 700-1050 mg 2-3 times a day

## 2024-09-27 ENCOUNTER — APPOINTMENT (OUTPATIENT)
Dept: GENERAL RADIOLOGY | Facility: CLINIC | Age: 29
End: 2024-09-27
Payer: COMMERCIAL

## 2024-09-27 ENCOUNTER — HOSPITAL ENCOUNTER (EMERGENCY)
Facility: CLINIC | Age: 29
Discharge: HOME | End: 2024-09-27
Attending: EMERGENCY MEDICINE | Admitting: EMERGENCY MEDICINE
Payer: COMMERCIAL

## 2024-09-27 VITALS
HEIGHT: 64 IN | TEMPERATURE: 98.3 F | SYSTOLIC BLOOD PRESSURE: 115 MMHG | WEIGHT: 293 LBS | OXYGEN SATURATION: 96 % | BODY MASS INDEX: 50.02 KG/M2 | HEART RATE: 72 BPM | DIASTOLIC BLOOD PRESSURE: 59 MMHG | RESPIRATION RATE: 16 BRPM

## 2024-09-27 DIAGNOSIS — R50.9 FEVER IN ADULT: ICD-10-CM

## 2024-09-27 DIAGNOSIS — R65.10 SIRS (SYSTEMIC INFLAMMATORY RESPONSE SYNDROME) (H): ICD-10-CM

## 2024-09-27 LAB
ALBUMIN UR-MCNC: NEGATIVE MG/DL
ANION GAP SERPL CALCULATED.3IONS-SCNC: 12 MMOL/L (ref 7–15)
APPEARANCE UR: ABNORMAL
BASOPHILS # BLD AUTO: 0.1 10E3/UL (ref 0–0.2)
BASOPHILS NFR BLD AUTO: 0 %
BILIRUB UR QL STRIP: NEGATIVE
BUN SERPL-MCNC: 12.2 MG/DL (ref 6–20)
CALCIUM SERPL-MCNC: 9.2 MG/DL (ref 8.8–10.4)
CHLORIDE SERPL-SCNC: 99 MMOL/L (ref 98–107)
COLOR UR AUTO: YELLOW
CREAT SERPL-MCNC: 0.65 MG/DL (ref 0.51–0.95)
EGFRCR SERPLBLD CKD-EPI 2021: >90 ML/MIN/1.73M2
EOSINOPHIL # BLD AUTO: 0 10E3/UL (ref 0–0.7)
EOSINOPHIL NFR BLD AUTO: 0 %
ERYTHROCYTE [DISTWIDTH] IN BLOOD BY AUTOMATED COUNT: 14.3 % (ref 10–15)
FLUAV RNA SPEC QL NAA+PROBE: NEGATIVE
FLUBV RNA RESP QL NAA+PROBE: NEGATIVE
GLUCOSE SERPL-MCNC: 108 MG/DL (ref 70–99)
GLUCOSE UR STRIP-MCNC: NEGATIVE MG/DL
GROUP A STREP BY PCR: NOT DETECTED
HCG UR QL: NEGATIVE
HCO3 SERPL-SCNC: 25 MMOL/L (ref 22–29)
HCT VFR BLD AUTO: 41.7 % (ref 35–47)
HGB BLD-MCNC: 13.4 G/DL (ref 11.7–15.7)
HGB UR QL STRIP: NEGATIVE
HOLD SPECIMEN: NORMAL
IMM GRANULOCYTES # BLD: 0.1 10E3/UL
IMM GRANULOCYTES NFR BLD: 0 %
KETONES UR STRIP-MCNC: NEGATIVE MG/DL
LEUKOCYTE ESTERASE UR QL STRIP: NEGATIVE
LYMPHOCYTES # BLD AUTO: 2.1 10E3/UL (ref 0.8–5.3)
LYMPHOCYTES NFR BLD AUTO: 13 %
MCH RBC QN AUTO: 28.8 PG (ref 26.5–33)
MCHC RBC AUTO-ENTMCNC: 32.1 G/DL (ref 31.5–36.5)
MCV RBC AUTO: 90 FL (ref 78–100)
MONOCYTES # BLD AUTO: 0.8 10E3/UL (ref 0–1.3)
MONOCYTES NFR BLD AUTO: 5 %
MUCOUS THREADS #/AREA URNS LPF: PRESENT /LPF
NEUTROPHILS # BLD AUTO: 13.5 10E3/UL (ref 1.6–8.3)
NEUTROPHILS NFR BLD AUTO: 82 %
NITRATE UR QL: NEGATIVE
NRBC # BLD AUTO: 0 10E3/UL
NRBC BLD AUTO-RTO: 0 /100
PH UR STRIP: 5.5 [PH] (ref 5–7)
PLATELET # BLD AUTO: 376 10E3/UL (ref 150–450)
POTASSIUM SERPL-SCNC: 4.2 MMOL/L (ref 3.4–5.3)
RBC # BLD AUTO: 4.65 10E6/UL (ref 3.8–5.2)
RBC URINE: 1 /HPF
RSV RNA SPEC NAA+PROBE: NEGATIVE
SARS-COV-2 RNA RESP QL NAA+PROBE: NEGATIVE
SODIUM SERPL-SCNC: 136 MMOL/L (ref 135–145)
SP GR UR STRIP: 1.03 (ref 1–1.03)
SQUAMOUS EPITHELIAL: 9 /HPF
UROBILINOGEN UR STRIP-MCNC: NORMAL MG/DL
WBC # BLD AUTO: 16.5 10E3/UL (ref 4–11)
WBC URINE: 2 /HPF

## 2024-09-27 PROCEDURE — 87637 SARSCOV2&INF A&B&RSV AMP PRB: CPT | Performed by: EMERGENCY MEDICINE

## 2024-09-27 PROCEDURE — 99285 EMERGENCY DEPT VISIT HI MDM: CPT | Mod: GC | Performed by: EMERGENCY MEDICINE

## 2024-09-27 PROCEDURE — 96374 THER/PROPH/DIAG INJ IV PUSH: CPT | Performed by: EMERGENCY MEDICINE

## 2024-09-27 PROCEDURE — 250N000011 HC RX IP 250 OP 636

## 2024-09-27 PROCEDURE — 71046 X-RAY EXAM CHEST 2 VIEWS: CPT

## 2024-09-27 PROCEDURE — 258N000003 HC RX IP 258 OP 636

## 2024-09-27 PROCEDURE — 87651 STREP A DNA AMP PROBE: CPT | Performed by: EMERGENCY MEDICINE

## 2024-09-27 PROCEDURE — 93005 ELECTROCARDIOGRAM TRACING: CPT | Performed by: EMERGENCY MEDICINE

## 2024-09-27 PROCEDURE — 96361 HYDRATE IV INFUSION ADD-ON: CPT | Performed by: EMERGENCY MEDICINE

## 2024-09-27 PROCEDURE — 81001 URINALYSIS AUTO W/SCOPE: CPT

## 2024-09-27 PROCEDURE — 93010 ELECTROCARDIOGRAM REPORT: CPT | Performed by: EMERGENCY MEDICINE

## 2024-09-27 PROCEDURE — 80048 BASIC METABOLIC PNL TOTAL CA: CPT

## 2024-09-27 PROCEDURE — 96375 TX/PRO/DX INJ NEW DRUG ADDON: CPT | Performed by: EMERGENCY MEDICINE

## 2024-09-27 PROCEDURE — 81025 URINE PREGNANCY TEST: CPT

## 2024-09-27 PROCEDURE — 99285 EMERGENCY DEPT VISIT HI MDM: CPT | Mod: 25 | Performed by: EMERGENCY MEDICINE

## 2024-09-27 PROCEDURE — 87040 BLOOD CULTURE FOR BACTERIA: CPT | Performed by: EMERGENCY MEDICINE

## 2024-09-27 PROCEDURE — 250N000011 HC RX IP 250 OP 636: Performed by: EMERGENCY MEDICINE

## 2024-09-27 PROCEDURE — 258N000003 HC RX IP 258 OP 636: Performed by: EMERGENCY MEDICINE

## 2024-09-27 PROCEDURE — 82310 ASSAY OF CALCIUM: CPT

## 2024-09-27 PROCEDURE — 85004 AUTOMATED DIFF WBC COUNT: CPT

## 2024-09-27 PROCEDURE — 36415 COLL VENOUS BLD VENIPUNCTURE: CPT | Performed by: EMERGENCY MEDICINE

## 2024-09-27 PROCEDURE — 250N000013 HC RX MED GY IP 250 OP 250 PS 637: Performed by: EMERGENCY MEDICINE

## 2024-09-27 RX ORDER — METOCLOPRAMIDE HYDROCHLORIDE 5 MG/ML
5 INJECTION INTRAMUSCULAR; INTRAVENOUS ONCE
Status: COMPLETED | OUTPATIENT
Start: 2024-09-27 | End: 2024-09-27

## 2024-09-27 RX ORDER — ONDANSETRON 2 MG/ML
4 INJECTION INTRAMUSCULAR; INTRAVENOUS ONCE
Status: COMPLETED | OUTPATIENT
Start: 2024-09-27 | End: 2024-09-27

## 2024-09-27 RX ORDER — ACETAMINOPHEN 500 MG
1000 TABLET ORAL ONCE
Status: COMPLETED | OUTPATIENT
Start: 2024-09-27 | End: 2024-09-27

## 2024-09-27 RX ORDER — IBUPROFEN 600 MG/1
600 TABLET, FILM COATED ORAL ONCE
Status: COMPLETED | OUTPATIENT
Start: 2024-09-27 | End: 2024-09-27

## 2024-09-27 RX ORDER — METOCLOPRAMIDE 5 MG/1
5 TABLET ORAL 4 TIMES DAILY PRN
Qty: 12 TABLET | Refills: 0 | Status: SHIPPED | OUTPATIENT
Start: 2024-09-27 | End: 2024-09-30

## 2024-09-27 RX ORDER — ACETAMINOPHEN 325 MG/1
975 TABLET ORAL ONCE
Status: COMPLETED | OUTPATIENT
Start: 2024-09-27 | End: 2024-09-27

## 2024-09-27 RX ADMIN — SODIUM CHLORIDE 1000 ML: 9 INJECTION, SOLUTION INTRAVENOUS at 10:56

## 2024-09-27 RX ADMIN — ACETAMINOPHEN 1000 MG: 500 TABLET ORAL at 09:33

## 2024-09-27 RX ADMIN — ONDANSETRON 4 MG: 2 INJECTION INTRAMUSCULAR; INTRAVENOUS at 10:56

## 2024-09-27 RX ADMIN — METOCLOPRAMIDE HYDROCHLORIDE 5 MG: 5 INJECTION INTRAMUSCULAR; INTRAVENOUS at 13:31

## 2024-09-27 RX ADMIN — SODIUM CHLORIDE 1000 ML: 9 INJECTION, SOLUTION INTRAVENOUS at 13:31

## 2024-09-27 RX ADMIN — IBUPROFEN 600 MG: 600 TABLET, FILM COATED ORAL at 11:24

## 2024-09-27 ASSESSMENT — ACTIVITIES OF DAILY LIVING (ADL)
ADLS_ACUITY_SCORE: 35

## 2024-09-27 ASSESSMENT — COLUMBIA-SUICIDE SEVERITY RATING SCALE - C-SSRS
2. HAVE YOU ACTUALLY HAD ANY THOUGHTS OF KILLING YOURSELF IN THE PAST MONTH?: NO
1. IN THE PAST MONTH, HAVE YOU WISHED YOU WERE DEAD OR WISHED YOU COULD GO TO SLEEP AND NOT WAKE UP?: NO
6. HAVE YOU EVER DONE ANYTHING, STARTED TO DO ANYTHING, OR PREPARED TO DO ANYTHING TO END YOUR LIFE?: NO

## 2024-09-27 NOTE — ED PROVIDER NOTES
ED Provider Note  [facility]      History     Chief Complaint   Patient presents with    Headache     Headache, SOB,  hot flashes, sore throat, upset stomach, tested negative for covid     HPI  Minda Silverman is a 29 year old female who presents with pounding bilateral headache, nausea, dizziness and fever. The symptoms started last night with a headache and sorethroat. Patient took tylenol which seemed to help but woke this morning at 6 am feeling very nauseous, chest tightness, hot and continued headache. The head comes and goes and is in the front of her head.  She denies hx of migraines.  She decided to come to the ED because of these symptoms. Patient has not been in direct contact with any sick people recently. She does have two kids, a 4 months old baby and kid in elementary school but they do not have any flu-like symptoms. She did a Covid test at home which was negative and has not had any prior care for her symptoms before her current ED visit.      Past Medical History  Past Medical History:   Diagnosis Date    NO ACTIVE PROBLEMS      Past Surgical History:   Procedure Laterality Date    CERCLAGE CERVICAL N/A 2024    Procedure: CERCLAGE, CERVIX, VAGINAL APPROACH;  Surgeon: Starr Watts MD;  Location: [facility]     SECTION N/A 2024    Procedure:  section;  Surgeon: Rhona Hernandez MD;  Location: [facility]    DILATION AND CURETTAGE SUCTION N/A 2022    Procedure: DILATION AND CURETTAGE, UTERUS, USING SUCTION;  Surgeon: Vika Hopper MD;  Location: [facility]     metoclopramide (REGLAN) 5 MG tablet  acetaminophen (TYLENOL) 325 MG tablet  docusate sodium (COLACE) 100 MG capsule  ferrous sulfate (FE TABS) 325 (65 Fe) MG EC tablet  ibuprofen (ADVIL/MOTRIN) 600 MG tablet  metoclopramide (REGLAN) 10 MG tablet  metoclopramide (REGLAN) 5 MG tablet  Prenatal Vit-Fe Fumarate-FA (PRENATAL VITAMINS) 28-0.8 MG TABS  senna-docusate  "(SENOKOT-S/PERICOLACE) 8.6-50 MG tablet  VITAMIN  B-6 25 MG tablet      No Known Allergies  Family History  No family history on file.  Social History   Social History     Tobacco Use    Smoking status: Former     Types: Cigarettes    Smokeless tobacco: Never   Substance Use Topics    Alcohol use: No    Drug use: Never      A medically appropriate review of systems was performed with pertinent positives and negatives noted in the HPI, and all other systems negative.    Physical Exam   BP: 117/76  Pulse: 102  Temp: (!) 102  F (38.9  C)  Resp: 16  Height: 162.6 cm (5' 4\")  Weight: 146.4 kg (322 lb 12.8 oz)  SpO2: 97 %  Physical Exam  Constitutional:       General: She is not in acute distress.     Appearance: Normal appearance. She is not ill-appearing, toxic-appearing or diaphoretic.   HENT:      Head: Normocephalic and atraumatic.      Right Ear: Tympanic membrane, ear canal and external ear normal.      Left Ear: Tympanic membrane, ear canal and external ear normal.      Nose: Nose normal.      Mouth/Throat:      Mouth: Mucous membranes are moist.      Pharynx: No oropharyngeal exudate or posterior oropharyngeal erythema.   Eyes:      General: No scleral icterus.        Right eye: No discharge.         Left eye: No discharge.      Extraocular Movements: Extraocular movements intact.   Neck:      Comments: No meningismus  Cardiovascular:      Rate and Rhythm: Tachycardia present.      Heart sounds: Normal heart sounds.   Pulmonary:      Effort: Pulmonary effort is normal.      Breath sounds: Normal breath sounds.   Abdominal:      Palpations: Abdomen is soft.      Tenderness: There is no abdominal tenderness.   Musculoskeletal:         General: Normal range of motion.      Cervical back: Normal range of motion and neck supple. No rigidity.   Lymphadenopathy:      Cervical: Cervical adenopathy (tender left anterior) present.   Skin:     General: Skin is warm and dry.      Coloration: Skin is not jaundiced or pale.    "   Findings: No bruising, erythema or rash.   Neurological:      General: No focal deficit present.      Mental Status: She is alert and oriented to person, place, and time. Mental status is at baseline.   Psychiatric:         Mood and Affect: Mood normal.         Behavior: Behavior normal.           ED Course, Procedures, & Data      Procedures            EKG Interpretation:      Interpreted by Enedina Alegria MD, Torie Rubio MD  Time reviewed: 10:35 am   Symptoms at time of EKG: Tachycardia, Chest tightness, Hyperthermia  Rhythm: normal sinus   Rate: Normal  Axis: Normal  Ectopy: none  Conduction: normal  ST Segments/ T Waves: No ST-T wave changes  Q Waves: none  Comparison to prior: No significant changes     Clinical Impression: normal             Results for orders placed or performed during the hospital encounter of 09/27/24   XR Chest 2 Views     Status: None    Narrative    XR CHEST 2 VIEWS 9/27/2024 11:52 AM    HISTORY: New onset Fever, chest tightness, sorethroat and headache.    COMPARISON: 11/29/2022      Impression    IMPRESSION: No acute cardiopulmonary process.    LEV HA MD         SYSTEM ID:  [id number]   Symptomatic Influenza A/B, RSV, & SARS-CoV2 PCR (COVID-19) Nasopharyngeal     Status: Normal    Specimen: Nasopharyngeal; Swab   Result Value Ref Range    Influenza A PCR Negative Negative    Influenza B PCR Negative Negative    RSV PCR Negative Negative    SARS CoV2 PCR Negative Negative    Narrative    Testing was performed using the Xpert Xpress CoV2/Flu/RSV Assay on the [employer] GeneXpert Instrument. This test should be ordered for the detection of SARS-CoV-2, influenza, and RSV viruses in individuals who meet clinical and/or epidemiological criteria. Test performance is unknown in asymptomatic patients. This test is for in vitro diagnostic use under the FDA EUA for laboratories certified under CLIA to perform high or moderate complexity testing. This test has not been FDA cleared or  approved. A negative result does not rule out the presence of PCR inhibitors in the specimen or target RNA in concentration below the limit of detection for the assay. If only one viral target is positive but coinfection with multiple targets is suspected, the sample should be re-tested with another FDA cleared, approved, or authorized test, if coinfection would change clinical management. This test was validated by the [facility]. These laboratories are certified under the Clinical Laboratory Improvement Amendments of 1988 (CLIA-88) as qualified to perform high complexity laboratory testing.   [facility] Draw     Status: None    Narrative    The following orders were created for panel order [facility] Draw.  Procedure                               Abnormality         Status                     ---------                               -----------         ------                     Extra Red Top Tube[[id number]]                               Final result               Extra Green Top (Lithium...[[id number]]                      Final result               Extra Purple Top Tube[[id number]]                            Final result                 Please view results for these tests on the individual orders.   Extra Red Top Tube     Status: None   Result Value Ref Range    Hold Specimen JIC    Extra Green Top (Lithium Heparin) Tube     Status: None   Result Value Ref Range    Hold Specimen JIC    Extra Purple Top Tube     Status: None   Result Value Ref Range    Hold Specimen JIC    UA with Microscopic reflex to Culture     Status: Abnormal    Specimen: Urine, Midstream   Result Value Ref Range    Color Urine Yellow Colorless, Straw, Light Yellow, Yellow    Appearance Urine Slightly Cloudy (A) Clear    Glucose Urine Negative Negative mg/dL    Bilirubin Urine Negative Negative    Ketones Urine Negative Negative mg/dL    Specific Gravity Urine 1.027 1.003 - 1.035    Blood Urine Negative Negative    pH Urine 5.5  5.0 - 7.0    Protein Albumin Urine Negative Negative mg/dL    Urobilinogen Urine Normal Normal, 2.0 mg/dL    Nitrite Urine Negative Negative    Leukocyte Esterase Urine Negative Negative    Mucus Urine Present (A) None Seen /LPF    RBC Urine 1 <=2 /HPF    WBC Urine 2 <=5 /HPF    Squamous Epithelials Urine 9 (H) <=1 /HPF    Narrative    Urine Culture not indicated   HCG qualitative urine (UPT)     Status: Normal   Result Value Ref Range    hCG Urine Qualitative Negative Negative   Basic metabolic panel     Status: Abnormal   Result Value Ref Range    Sodium 136 135 - 145 mmol/L    Potassium 4.2 3.4 - 5.3 mmol/L    Chloride 99 98 - 107 mmol/L    Carbon Dioxide (CO2) 25 22 - 29 mmol/L    Anion Gap 12 7 - 15 mmol/L    Urea Nitrogen 12.2 6.0 - 20.0 mg/dL    Creatinine 0.65 0.51 - 0.95 mg/dL    GFR Estimate >90 >60 mL/min/1.73m2    Calcium 9.2 8.8 - 10.4 mg/dL    Glucose 108 (H) 70 - 99 mg/dL   CBC with platelets and differential     Status: Abnormal   Result Value Ref Range    WBC Count 16.5 (H) 4.0 - 11.0 10e3/uL    RBC Count 4.65 3.80 - 5.20 10e6/uL    Hemoglobin 13.4 11.7 - 15.7 g/dL    Hematocrit 41.7 35.0 - 47.0 %    MCV 90 78 - 100 fL    MCH 28.8 26.5 - 33.0 pg    MCHC 32.1 31.5 - 36.5 g/dL    RDW 14.3 10.0 - 15.0 %    Platelet Count 376 150 - 450 10e3/uL    % Neutrophils 82 %    % Lymphocytes 13 %    % Monocytes 5 %    % Eosinophils 0 %    % Basophils 0 %    % Immature Granulocytes 0 %    NRBCs per 100 WBC 0 <1 /100    Absolute Neutrophils 13.5 (H) 1.6 - 8.3 10e3/uL    Absolute Lymphocytes 2.1 0.8 - 5.3 10e3/uL    Absolute Monocytes 0.8 0.0 - 1.3 10e3/uL    Absolute Eosinophils 0.0 0.0 - 0.7 10e3/uL    Absolute Basophils 0.1 0.0 - 0.2 10e3/uL    Absolute Immature Granulocytes 0.1 <=0.4 10e3/uL    Absolute NRBCs 0.0 10e3/uL   EKG 12-lead, tracing only     Status: None   Result Value Ref Range    Systolic Blood Pressure  mmHg    Diastolic Blood Pressure  mmHg    Ventricular Rate 89 BPM    Atrial Rate 89 BPM    NC  Interval 142 ms    QRS Duration 100 ms     ms    QTc 469 ms    P Axis -12 degrees    R AXIS 21 degrees    T Axis 16 degrees    Interpretation ECG       Sinus rhythm  Normal ECG  Unconfirmed report - interpretation of this ECG is computer generated - see medical record for final interpretation  Confirmed by - EMERGENCY ROOM, PHYSICIAN ([id number]),  WIL LYNN ([id number]) on 9/29/2024 2:34:03 PM     Group A Streptococcus PCR Throat Swab     Status: Normal    Specimen: Throat; Swab   Result Value Ref Range    Group A strep by PCR Not Detected Not Detected    Narrative    The Xpert Xpress Strep A test, performed on the [employer], is a rapid, qualitative in vitro diagnostic test for the detection of Streptococcus pyogenes (Group A ß-hemolytic Streptococcus, Strep A) in throat swab specimens from patients with signs and symptoms of pharyngitis. The Xpert Xpress Strep A test can be used as an aid in the diagnosis of Group A Streptococcal pharyngitis. The assay is not intended to monitor treatment for Group A Streptococcus infections. The Xpert Xpress Strep A test utilizes an automated real-time polymerase chain reaction (PCR) to detect Streptococcus pyogenes DNA.   Blood Culture Line, venous     Status: Normal (Preliminary result)    Specimen: Line, venous; Blood   Result Value Ref Range    Culture No growth after 3 days    CBC with platelets differential     Status: Abnormal    Narrative    The following orders were created for panel order CBC with platelets differential.  Procedure                               Abnormality         Status                     ---------                               -----------         ------                     CBC with platelets and d...[[id number]]  Abnormal            Final result                 Please view results for these tests on the individual orders.     Medications   acetaminophen (TYLENOL) tablet 1,000 mg (1,000 mg Oral $Given 9/27/24 4718)    acetaminophen (TYLENOL) tablet 975 mg (975 mg Oral Not Given 9/27/24 1048)   ibuprofen (ADVIL/MOTRIN) tablet 600 mg (600 mg Oral $Given 9/27/24 1124)   sodium chloride 0.9% BOLUS 1,000 mL (0 mLs Intravenous Stopped 9/27/24 1209)   ondansetron (ZOFRAN) injection 4 mg (4 mg Intravenous $Given 9/27/24 1056)   sodium chloride 0.9% BOLUS 1,000 mL (0 mLs Intravenous Stopped 9/27/24 1500)   metoclopramide (REGLAN) injection 5 mg (5 mg Intravenous $Given 9/27/24 1331)     Labs Ordered and Resulted from Time of ED Arrival to Time of ED Departure   ROUTINE UA WITH MICROSCOPIC REFLEX TO CULTURE - Abnormal       Result Value    Color Urine Yellow      Appearance Urine Slightly Cloudy (*)     Glucose Urine Negative      Bilirubin Urine Negative      Ketones Urine Negative      Specific Gravity Urine 1.027      Blood Urine Negative      pH Urine 5.5      Protein Albumin Urine Negative      Urobilinogen Urine Normal      Nitrite Urine Negative      Leukocyte Esterase Urine Negative      Mucus Urine Present (*)     RBC Urine 1      WBC Urine 2      Squamous Epithelials Urine 9 (*)    BASIC METABOLIC PANEL - Abnormal    Sodium 136      Potassium 4.2      Chloride 99      Carbon Dioxide (CO2) 25      Anion Gap 12      Urea Nitrogen 12.2      Creatinine 0.65      GFR Estimate >90      Calcium 9.2      Glucose 108 (*)    CBC WITH PLATELETS AND DIFFERENTIAL - Abnormal    WBC Count 16.5 (*)     RBC Count 4.65      Hemoglobin 13.4      Hematocrit 41.7      MCV 90      MCH 28.8      MCHC 32.1      RDW 14.3      Platelet Count 376      % Neutrophils 82      % Lymphocytes 13      % Monocytes 5      % Eosinophils 0      % Basophils 0      % Immature Granulocytes 0      NRBCs per 100 WBC 0      Absolute Neutrophils 13.5 (*)     Absolute Lymphocytes 2.1      Absolute Monocytes 0.8      Absolute Eosinophils 0.0      Absolute Basophils 0.1      Absolute Immature Granulocytes 0.1      Absolute NRBCs 0.0     INFLUENZA A/B, RSV, & SARS-COV2 PCR -  Normal    Influenza A PCR Negative      Influenza B PCR Negative      RSV PCR Negative      SARS CoV2 PCR Negative     HCG QUALITATIVE URINE - Normal    hCG Urine Qualitative Negative     GROUP A STREPTOCOCCUS PCR THROAT SWAB - Normal    Group A strep by PCR Not Detected       XR Chest 2 Views   Final Result   IMPRESSION: No acute cardiopulmonary process.      LEV HA MD            SYSTEM ID:  [id number]             Critical care was not performed.     Medical Decision Making  The patient's presentation was of moderate complexity (an acute illness with systemic symptoms).    The patient's evaluation involved:  review of 2 test result(s) ordered prior to this encounter (see separate area of note for details)  ordering and/or review of 3+ test(s) in this encounter (see separate area of note for details)  independent interpretation of testing performed by another health professional (cxr)    The patient's management necessitated high risk (a parenteral controlled substance).    Assessment & Plan    Minda Silverman is a 29 year old female who presents with pounding headache, nausea, dizziness and fever. The symptoms started last night with a headache and sorethroat. Patient took tylenol which seemed to help but woke this morning at 6 am feeling very nauseous, chest tightness, hyperthermia and worsening headache.    A broad differential including, a viral infection, UTI, and pulmonary diease were considered. Exam and ED course was notable for a patient with signs of SIRS including tachycardia, hyperthermia and elevated WBC. No signs of a throat infection or UTI on physical exam and UA respectively. Patient tested negative for common viral infections and GAS throat infection. Further workup revealed..............  Patient was treated with fluids and antipyretics and analgesics for pain and fever management.     I have reviewed the nursing notes. I have reviewed the findings, diagnosis, plan and need for follow  up with the patient.    Discharge Medication List as of 9/27/2024  3:00 PM        START taking these medications    Details   !! metoclopramide (REGLAN) 5 MG tablet Take 1 tablet (5 mg) by mouth 4 times daily as needed (nausea and headache)., Disp-12 tablet, R-0, E-Prescribe       !! - Potential duplicate medications found. Please discuss with provider.          Final diagnoses:   Fever in adult   SIRS (systemic inflammatory response syndrome) (H)   Enedina Alegria  PGY-1 Resident     --    ED Attending Physician Attestation    I Torie Ruibo MD, cared for this patient with the Resident. I have performed a history and physical examination of the patient and discussed management with the resident. I reviewed the resident's documentation above and agree with the documented findings and plan of care.    Summary of HPI, PE, ED Course   Patient is a 29 year old female evaluated in the emergency department for fever, headache, sore throat, nausea since yesterday. Exam and ED course notable for well appearing female without meningismus with fever of 102.  Given tylenol, ibuprofen, fluids, zofran and reglan with improvement of her symptoms.  Elevated wbc, normal cxr, normal covid panel and strep test, normal ua/upt.  Send blood cx for caution. On exam does not appear toxic. Appears consistent with viral illness.  Discharged with reglan, rest, otc tylenol and ibuprofen and pcp follow up if needed. . After the completion of care in the emergency department, the patient was discharged.      Torie Rubio MD  Emergency Medicine      Torie Rubio MD  [facility] EMERGENCY DEPARTMENT  9/27/2024     Torie Rubio MD  09/30/24 1739       Torie Rubio MD  09/30/24 1734

## 2024-09-27 NOTE — ED TRIAGE NOTES
Triage Assessment (Adult)       Row Name 09/27/24 0912          Triage Assessment    Airway WDL WDL        Respiratory WDL    Respiratory WDL WDL        Skin Circulation/Temperature WDL    Skin Circulation/Temperature WDL WDL        Cardiac WDL    Cardiac WDL WDL        Peripheral/Neurovascular WDL    Peripheral Neurovascular WDL WDL        Cognitive/Neuro/Behavioral WDL    Cognitive/Neuro/Behavioral WDL WDL

## 2024-09-27 NOTE — DISCHARGE INSTRUCTIONS
Alternate tylenol 650 mg and ibuprofen 600 mg every 3 hours to help control your fever.  That means you would get each type of medicine 4 times per day.    Stay well hydrated.     Rest as much as you are able.     You can take reglan for nausea and headache if needed.     Seek medical attention if worsening/concerns.

## 2024-09-29 LAB
ATRIAL RATE - MUSE: 89 BPM
DIASTOLIC BLOOD PRESSURE - MUSE: NORMAL MMHG
INTERPRETATION ECG - MUSE: NORMAL
P AXIS - MUSE: -12 DEGREES
PR INTERVAL - MUSE: 142 MS
QRS DURATION - MUSE: 100 MS
QT - MUSE: 386 MS
QTC - MUSE: 469 MS
R AXIS - MUSE: 21 DEGREES
SYSTOLIC BLOOD PRESSURE - MUSE: NORMAL MMHG
T AXIS - MUSE: 16 DEGREES
VENTRICULAR RATE- MUSE: 89 BPM

## 2024-10-02 LAB — BACTERIA BLD CULT: NO GROWTH

## 2025-01-13 ENCOUNTER — HOSPITAL ENCOUNTER (EMERGENCY)
Facility: CLINIC | Age: 30
Discharge: HOME | End: 2025-01-13
Admitting: PHYSICIAN ASSISTANT
Payer: COMMERCIAL

## 2025-01-13 VITALS
HEIGHT: 67 IN | TEMPERATURE: 98.3 F | HEART RATE: 69 BPM | SYSTOLIC BLOOD PRESSURE: 120 MMHG | DIASTOLIC BLOOD PRESSURE: 82 MMHG | OXYGEN SATURATION: 99 % | RESPIRATION RATE: 16 BRPM | BODY MASS INDEX: 45.99 KG/M2 | WEIGHT: 293 LBS

## 2025-01-13 DIAGNOSIS — M54.10 RADICULAR LOW BACK PAIN: ICD-10-CM

## 2025-01-13 LAB
ALBUMIN UR-MCNC: 30 MG/DL
APPEARANCE UR: CLEAR
BILIRUB UR QL STRIP: ABNORMAL
CAOX CRY #/AREA URNS HPF: ABNORMAL /HPF
COLOR UR AUTO: YELLOW
GLUCOSE UR STRIP-MCNC: 100 MG/DL
HCG UR QL: NEGATIVE
HGB UR QL STRIP: NEGATIVE
KETONES UR STRIP-MCNC: ABNORMAL MG/DL
LEUKOCYTE ESTERASE UR QL STRIP: NEGATIVE
NITRATE UR QL: NEGATIVE
PH UR STRIP: 5.5 [PH] (ref 5–7)
RBC URINE: 0 /HPF
SP GR UR STRIP: >=1.03 (ref 1–1.03)
SQUAMOUS EPITHELIAL: 10 /HPF
UROBILINOGEN UR STRIP-MCNC: 0.2 MG/DL
WBC URINE: <1 /HPF

## 2025-01-13 PROCEDURE — 99283 EMERGENCY DEPT VISIT LOW MDM: CPT | Performed by: PHYSICIAN ASSISTANT

## 2025-01-13 PROCEDURE — 250N000011 HC RX IP 250 OP 636: Performed by: PHYSICIAN ASSISTANT

## 2025-01-13 PROCEDURE — 81025 URINE PREGNANCY TEST: CPT | Performed by: PHYSICIAN ASSISTANT

## 2025-01-13 PROCEDURE — 96372 THER/PROPH/DIAG INJ SC/IM: CPT | Performed by: PHYSICIAN ASSISTANT

## 2025-01-13 PROCEDURE — 250N000013 HC RX MED GY IP 250 OP 250 PS 637: Performed by: PHYSICIAN ASSISTANT

## 2025-01-13 PROCEDURE — 81003 URINALYSIS AUTO W/O SCOPE: CPT | Performed by: PHYSICIAN ASSISTANT

## 2025-01-13 PROCEDURE — 99284 EMERGENCY DEPT VISIT MOD MDM: CPT | Performed by: PHYSICIAN ASSISTANT

## 2025-01-13 RX ORDER — CYCLOBENZAPRINE HCL 10 MG
10 TABLET ORAL 2 TIMES DAILY PRN
Qty: 10 TABLET | Refills: 0 | Status: SHIPPED | OUTPATIENT
Start: 2025-01-13

## 2025-01-13 RX ORDER — CYCLOBENZAPRINE HCL 10 MG
10 TABLET ORAL ONCE
Status: COMPLETED | OUTPATIENT
Start: 2025-01-13 | End: 2025-01-13

## 2025-01-13 RX ORDER — LIDOCAINE 4 G/G
1 PATCH TOPICAL ONCE
Status: DISCONTINUED | OUTPATIENT
Start: 2025-01-13 | End: 2025-01-13 | Stop reason: HOSPADM

## 2025-01-13 RX ORDER — IBUPROFEN 600 MG/1
600 TABLET, FILM COATED ORAL EVERY 6 HOURS PRN
Qty: 20 TABLET | Refills: 0 | Status: SHIPPED | OUTPATIENT
Start: 2025-01-13

## 2025-01-13 RX ORDER — KETOROLAC TROMETHAMINE 15 MG/ML
15 INJECTION, SOLUTION INTRAMUSCULAR; INTRAVENOUS ONCE
Status: COMPLETED | OUTPATIENT
Start: 2025-01-13 | End: 2025-01-13

## 2025-01-13 RX ADMIN — LIDOCAINE 1 PATCH: 4 PATCH TOPICAL at 14:22

## 2025-01-13 RX ADMIN — CYCLOBENZAPRINE 10 MG: 10 TABLET, FILM COATED ORAL at 14:22

## 2025-01-13 RX ADMIN — KETOROLAC TROMETHAMINE 15 MG: 15 INJECTION, SOLUTION INTRAMUSCULAR; INTRAVENOUS at 14:28

## 2025-01-13 ASSESSMENT — ACTIVITIES OF DAILY LIVING (ADL)
ADLS_ACUITY_SCORE: 54
ADLS_ACUITY_SCORE: 54

## 2025-01-13 ASSESSMENT — ENCOUNTER SYMPTOMS: BACK PAIN: 1

## 2025-01-13 NOTE — LETTER
January 13, 2025      To Whom It May Concern:      Minda Silverman was seen in our Emergency Department today, 01/13/25.    Sincerely,        Holli Glover RN  Electronically signed

## 2025-01-13 NOTE — ED PROVIDER NOTES
ED Provider Note  [facility]      History     Chief Complaint   Patient presents with    Back Pain     Lower back pain for 2 weeks         Back Pain    30yo F no pmhx p/w atraumatic lumbar (R>L) back pain x two weeks.  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  She endorses some mild radiation to her RLE. Denies fevers/chills, saddle paresthesia, extremity paresthesia or weakness, or bowel/bladder dysfunction, vaginal symptoms.  LMP 1 month ago.  Has been taking APAP at home without improvement.      Past Medical History  Past Medical History:   Diagnosis Date    NO ACTIVE PROBLEMS      Past Surgical History:   Procedure Laterality Date    CERCLAGE CERVICAL N/A 2024    Procedure: CERCLAGE, CERVIX, VAGINAL APPROACH;  Surgeon: Starr Watts MD;  Location: [facility] L[facility]     SECTION N/A 2024    Procedure:  section;  Surgeon: Rhona Hernandez MD;  Location: [facility] L[facility]    DILATION AND CURETTAGE SUCTION N/A 2022    Procedure: DILATION AND CURETTAGE, UTERUS, USING SUCTION;  Surgeon: Vika Hopper MD;  Location: [facility] L[facility]     cyclobenzaprine (FLEXERIL) 10 MG tablet  ibuprofen (ADVIL/MOTRIN) 600 MG tablet  acetaminophen (TYLENOL) 325 MG tablet  docusate sodium (COLACE) 100 MG capsule  ferrous sulfate (FE TABS) 325 (65 Fe) MG EC tablet  metoclopramide (REGLAN) 10 MG tablet  metoclopramide (REGLAN) 5 MG tablet  Prenatal Vit-Fe Fumarate-FA (PRENATAL VITAMINS) 28-0.8 MG TABS  senna-docusate (SENOKOT-S/PERICOLACE) 8.6-50 MG tablet  VITAMIN  B-6 25 MG tablet      No Known Allergies  Family History  No family history on file.  Social History   Social History     Tobacco Use    Smoking status: Former     Types: Cigarettes    Smokeless tobacco: Never   Substance Use Topics    Alcohol use: No    Drug use: Never      A medically appropriate review of systems was performed with pertinent positives and negatives noted  "in the HPI, and all other systems negative.    Physical Exam   BP: 105/72  Pulse: 77  Temp: 98.3  F (36.8  C)  Resp: 16  Height: 160 cm (5' 3\")  Weight: 149.4 kg (329 lb 4.8 oz)  SpO2: 98 %  Physical Exam  Constitutional:       General: She is not in acute distress.     Appearance: Normal appearance. She is not diaphoretic.   HENT:      Head: Atraumatic.      Mouth/Throat:      Mouth: Mucous membranes are moist.   Eyes:      General: No scleral icterus.     Conjunctiva/sclera: Conjunctivae normal.   Cardiovascular:      Rate and Rhythm: Normal rate.   Pulmonary:      Effort: No respiratory distress.   Abdominal:      General: Abdomen is flat.   Musculoskeletal:      Cervical back: Neck supple.        Back:       Comments: Full active and passive ROM of back, upper, and lower extremities.  No overlying skin changes.  5/5 Strength in b/l lower extremities.  Full sensation.  2+ DP pulses.  Negative straight leg test on left, positive on right  Ambulatory without difficulty.     Skin:     General: Skin is warm.   Neurological:      Mental Status: She is alert.           ED Course, Procedures, & Data      Procedures                Results for orders placed or performed during the hospital encounter of 01/13/25   UA with Microscopic reflex to Culture     Status: Abnormal    Specimen: Urine, Clean Catch   Result Value Ref Range    Color Urine Yellow Colorless, Straw, Light Yellow, Yellow    Appearance Urine Clear Clear    Glucose Urine 100 (A) Negative mg/dL    Bilirubin Urine Small (A) Negative    Ketones Urine Trace (A) Negative mg/dL    Specific Gravity Urine >=1.030 1.003 - 1.035    Blood Urine Negative Negative    pH Urine 5.5 5.0 - 7.0    Protein Albumin Urine 30 (A) Negative mg/dL    Urobilinogen Urine 0.2 0.2, 1.0 mg/dL    Nitrite Urine Negative Negative    Leukocyte Esterase Urine Negative Negative    Calcium Oxalate Crystals Urine Moderate (A) None Seen /HPF    RBC Urine 0 <=2 /HPF    WBC Urine <1 <=5 /HPF    " Squamous Epithelials Urine 10 (H) <=1 /HPF    Narrative    Urine Culture not indicated   HCG qualitative urine     Status: Normal   Result Value Ref Range    hCG Urine Qualitative Negative Negative     Medications   Lidocaine (LIDOCARE) 4 % Patch 1 patch (1 patch Transdermal $Patch/Med Applied 1/13/25 1422)   ketorolac (TORADOL) injection 15 mg (15 mg Intramuscular $Given 1/13/25 1428)   cyclobenzaprine (FLEXERIL) tablet 10 mg (10 mg Oral $Given 1/13/25 1422)     Labs Ordered and Resulted from Time of ED Arrival to Time of ED Departure   ROUTINE UA WITH MICROSCOPIC REFLEX TO CULTURE - Abnormal       Result Value    Color Urine Yellow      Appearance Urine Clear      Glucose Urine 100 (*)     Bilirubin Urine Small (*)     Ketones Urine Trace (*)     Specific Gravity Urine >=1.030      Blood Urine Negative      pH Urine 5.5      Protein Albumin Urine 30 (*)     Urobilinogen Urine 0.2      Nitrite Urine Negative      Leukocyte Esterase Urine Negative      Calcium Oxalate Crystals Urine Moderate (*)     RBC Urine 0      WBC Urine <1      Squamous Epithelials Urine 10 (*)    HCG QUALITATIVE URINE - Normal    hCG Urine Qualitative Negative       No orders to display          Critical care was not performed.     Medical Decision Making  The patient's presentation was of low complexity (an acute and uncomplicated illness or injury).    The patient's evaluation involved:  review of external note(s) from 3+ sources (see separate area of note for details)  ordering and/or review of 2 test(s) in this encounter (see separate area of note for details)    The patient's management necessitated moderate risk (prescription drug management including medications given in the ED).    Assessment & Plan    28yo F no pmhx p/w atraumatic lumbar (R>L) back pain x two weeks, with radiation to her RLE.  No red flag back symptoms.  On exam, she has right paraspinal TTP and positive straight leg raise.  Suspicion for cauda equina or epidural  abscess is low.  UA obtained and negative for infectious markers ruling out pyelonephritis.  UPT negative taking ectopic off the table.  Patient treated with ketorolac, Flexeril, lidocaine patch in ED with improvement.  She be discharged with prescription for ibuprofen and Flexeril.  Referral for PT provided.  PCP follow-up and ER return precautions given for worsening symptoms.        I have reviewed the nursing notes. I have reviewed the findings, diagnosis, plan and need for follow up with the patient.    New Prescriptions    CYCLOBENZAPRINE (FLEXERIL) 10 MG TABLET    Take 1 tablet (10 mg) by mouth 2 times daily as needed for muscle spasms.    IBUPROFEN (ADVIL/MOTRIN) 600 MG TABLET    Take 1 tablet (600 mg) by mouth every 6 hours as needed for moderate pain.       Final diagnoses:   Radicular low back pain         Brock Jacques PA-C  [facility] EMERGENCY DEPARTMENT  1/13/2025     Brock Jacques PA-C  01/13/25 1526

## 2025-01-13 NOTE — ED TRIAGE NOTES
Triage Assessment (Adult)       Row Name 01/13/25 1311          Triage Assessment    Airway WDL WDL        Respiratory WDL    Respiratory WDL WDL        Skin Circulation/Temperature WDL    Skin Circulation/Temperature WDL WDL        Cardiac WDL    Cardiac WDL WDL        Peripheral/Neurovascular WDL    Peripheral Neurovascular WDL WDL        Cognitive/Neuro/Behavioral WDL    Cognitive/Neuro/Behavioral WDL WDL

## 2025-05-17 ENCOUNTER — HEALTH MAINTENANCE LETTER (OUTPATIENT)
Age: 30
End: 2025-05-17

## (undated) DEVICE — STRAP KNEE/BODY 31143004

## (undated) DEVICE — GLOVE ESTEEM POWDER FREE SMT 6.5  2D72PT65

## (undated) DEVICE — PREP CHLORAPREP 26ML TINTED ORANGE  260815

## (undated) DEVICE — SOL ADH LIQUID BENZOIN SWAB 0.6ML C1544

## (undated) DEVICE — CATH TRAY FOLEY 16FR BARDEX W/DRAIN BAG STATLOCK 300316A

## (undated) DEVICE — GLOVE PROTEXIS BLUE W/NEU-THERA 7.0  2D73EB70

## (undated) DEVICE — PAD CHUX UNDERPAD 30X30"

## (undated) DEVICE — GLOVE ESTEEM POWDER FREE SMT 7.0  2D72PT70

## (undated) DEVICE — STOCKING SLEEVE COMPRESSION CALF LG

## (undated) DEVICE — ESU PENCIL W/SMOKE EVAC NEPTUNE STRYKER 0703-046-000

## (undated) DEVICE — PACK C-SECTION LF PL15OTA83B

## (undated) DEVICE — PREP SKIN SCRUB TRAY 4461A

## (undated) DEVICE — DRSG STERI STRIP 1/4X3" R1541

## (undated) DEVICE — SOL NACL 0.9% IRRIG 1000ML BOTTLE 07138-09

## (undated) DEVICE — SOL WATER IRRIG 1000ML BOTTLE 07139-09

## (undated) DEVICE — LUBRICATING JELLY 2.7GM T00137

## (undated) DEVICE — SPONGE RAY-TEC 4X8" 7318

## (undated) DEVICE — SOL NACL 0.9% IRRIG 1000ML BOTTLE 2F7124

## (undated) DEVICE — Device

## (undated) DEVICE — SU VICRYL 0 CT-1 36" J346H

## (undated) DEVICE — SU VICRYL 3-0 CTX 36" UND J980H

## (undated) DEVICE — SU MONOCRYL 4-0 PS-2 18" UND Y496G

## (undated) RX ORDER — FENTANYL CITRATE 50 UG/ML
INJECTION, SOLUTION INTRAMUSCULAR; INTRAVENOUS
Status: DISPENSED
Start: 2024-05-24

## (undated) RX ORDER — FENTANYL CITRATE 50 UG/ML
INJECTION, SOLUTION INTRAMUSCULAR; INTRAVENOUS
Status: DISPENSED
Start: 2022-05-07

## (undated) RX ORDER — MORPHINE SULFATE 1 MG/ML
INJECTION, SOLUTION EPIDURAL; INTRATHECAL; INTRAVENOUS
Status: DISPENSED
Start: 2024-05-24

## (undated) RX ORDER — OXYTOCIN/0.9 % SODIUM CHLORIDE 30/500 ML
PLASTIC BAG, INJECTION (ML) INTRAVENOUS
Status: DISPENSED
Start: 2024-05-24

## (undated) RX ORDER — PHENYLEPHRINE HCL IN 0.9% NACL 50MG/250ML
PLASTIC BAG, INJECTION (ML) INTRAVENOUS
Status: DISPENSED
Start: 2024-05-24